# Patient Record
Sex: MALE | Race: WHITE | NOT HISPANIC OR LATINO | Employment: OTHER | URBAN - METROPOLITAN AREA
[De-identification: names, ages, dates, MRNs, and addresses within clinical notes are randomized per-mention and may not be internally consistent; named-entity substitution may affect disease eponyms.]

---

## 2017-08-22 ENCOUNTER — TRANSCRIBE ORDERS (OUTPATIENT)
Dept: PHYSICAL THERAPY | Facility: CLINIC | Age: 82
End: 2017-08-22

## 2017-08-22 DIAGNOSIS — M19.90 ARTHRITIS: Primary | ICD-10-CM

## 2017-08-22 DIAGNOSIS — M51.36 BULGING LUMBAR DISC: ICD-10-CM

## 2017-08-25 ENCOUNTER — TREATMENT (OUTPATIENT)
Dept: PHYSICAL THERAPY | Facility: CLINIC | Age: 82
End: 2017-08-25

## 2017-08-25 DIAGNOSIS — M54.42 LEFT-SIDED LOW BACK PAIN WITH LEFT-SIDED SCIATICA, UNSPECIFIED CHRONICITY: Primary | ICD-10-CM

## 2017-08-25 DIAGNOSIS — M51.36 BULGING LUMBAR DISC: ICD-10-CM

## 2017-08-25 PROCEDURE — 97110 THERAPEUTIC EXERCISES: CPT | Performed by: PHYSICAL THERAPIST

## 2017-08-25 PROCEDURE — G8978 MOBILITY CURRENT STATUS: HCPCS | Performed by: PHYSICAL THERAPIST

## 2017-08-25 PROCEDURE — G8979 MOBILITY GOAL STATUS: HCPCS | Performed by: PHYSICAL THERAPIST

## 2017-08-25 PROCEDURE — 97161 PT EVAL LOW COMPLEX 20 MIN: CPT | Performed by: PHYSICAL THERAPIST

## 2017-08-25 NOTE — PROGRESS NOTES
Physical Therapy Initial Evaluation and Plan of Care    Patient: Brandon Yo   : 1935  Diagnosis/ICD-10 Code:  No primary diagnosis found.  Referring practitioner: Bonita Sanchez MD  Past medical Hx reviewed: 2017  Subjective Evaluation    History of Present Illness  Onset date: 6 months prior.  Mechanism of injury: I started having back pain about 6 months ago but it gradually worsened.  I finally got to a point that I couldn't stand very long before having pain.  I saw doctor and had imaging done for the back.  She says I have bulging discs in the back.  I have problem lifting the L foot.  I have stumbled because of the foot but no falls.    Most difficulty with walking > 10-15 min before pain kicks in.  Taking medication helps but       Patient Occupation: retired.   Pain  Current pain ratin  At best pain ratin  At worst pain ratin  Location: L lower back, flank area.   (Can feel tired if I walk too long)  Quality: dull ache  Relieving factors: rest, medications and change in position  Aggravating factors: stairs, lifting and ambulation    Social Support  Lives in: one-story house  Lives with: spouse    Diagnostic Tests  X-ray: abnormal  MRI studies: abnormal (Lumbar disc bulge )    Patient Goals  Patient goals for therapy: return to sport/leisure activities, increased strength, independence with ADLs/IADLs, increased motion and decreased pain  Patient goal: back to 1x/week golf.        Objective     Static Posture     Lumbar Spine   Decreased lordosis.     Comments  L lateral pelvis, R shoulders shifted.    Active Range of Motion     Lumbar   Flexion: WFL  Extension: Active lumbar extension: 50%    Left lateral flexion: Active left lumbar lateral flexion: 75%    Right lateral flexion: Active right lumbar lateral flexion: 75%    Left rotation: Active left lumbar rotation: 75%   Right rotation: Active right lumbar rotation: 75%      Strength/Myotome Testing     Left Hip   Planes of  Motion   Flexion: 4+  Extension: 4+  Abduction: 4+  Adduction: 5  External rotation: 4    Right Hip   Planes of Motion   Flexion: 4+    Left Ankle/Foot   Dorsiflexion: 3+  Plantar flexion: 4-    Right Ankle/Foot   Dorsiflexion: 5    Tests     Lumbar     Right   Negative passive SLR.     Right Pelvic Girdle/Sacrum   Negative: sacrum compression and gapping.     Additional Tests Details  + (L) PINN      Ambulation   Weight-Bearing Status   Weight-Bearing Status (Left): full weight bearing   Weight-Bearing Status (Right): full weight-bearing      Ambulation: Level Surfaces   Ambulation without assistive device: independent    Ambulation: Stairs   Ascend stairs: independent  Pattern: reciprocal  Railings: one rail  Descend stairs: independent  Pattern: reciprocal  Railings: one rail    Observational Gait   Decreased walking speed.     Additional Observational Gait Details  Noted mild foot drop on L with ambulation.       Assessment & Plan     Assessment  Impairments: abnormal or restricted ROM, activity intolerance, impaired physical strength, lacks appropriate home exercise program and pain with function  Assessment details: Pt presents to PT with symptoms consistent with symptoms consistent with disc involvement and neural compression resulting in moderate drop foot on the L.  Pt would benefit from skilled PT intervention to address the deficits noted.     Prognosis: good  Prognosis details:     SHORT TERM GOALS: Time for Goal Achievement: 2-3 weeks    1.  Patient to be compliant and progression of HEP                             2.  Pain level < 4/10 at worst with mentioned activities to improve function  3.  Increased thoracic, lumbar and SIJ mobility to allow for increased lumbar AROM with less pain.  4.  Increased lumbar AROM to by 25% in all planes to allow for increased ease with sit-stand transfers and functional activities    LONG TERM GOALS: Time for Goal Achievement: 5- months  1.  Pt. to score < 10 % on  Back Index  2.  Pain level < 2/10 with all listed activities to return to normal and ambulate > 20 min.    3.  Lumbar AROM to WFL to allow for return to household & recreational activities w/o increased symptoms.    4.  (B) LE and lower abdominal strength to 5/5 to allow for pushing, pulling and activities to occur without pain (driving, sitting, household  & Job requirements)      Functional Limitations: carrying objects, lifting, sleeping, walking, uncomfortable because of pain, moving in bed, sitting and unable to perform repetitive tasks  Plan  Therapy options: will be seen for skilled physical therapy services  Planned modality interventions: cryotherapy, electrical stimulation/Russian stimulation, TENS and thermotherapy (hydrocollator packs)  Planned therapy interventions: body mechanics training, flexibility, functional ROM exercises, home exercise program, manual therapy, neuromuscular re-education, postural training, spinal/joint mobilization, stretching, strengthening and soft tissue mobilization  Frequency: 1x week (to 2x / week)  Duration in weeks: 6  Treatment plan discussed with: patient      Manual Therapy:    -     mins  51277;  Therapeutic Exercise:    18     mins  14284;     Neuromuscular Lisseth:    -    mins  31143;    Therapeutic Activity:     -     mins  43776;     Gait Training:      -     mins  79636;     Ultrasound:     -     mins  37092;    Electrical Stimulation:    -     mins  77751 ( );  Dry Needling     -     mins self-pay    Timed Treatment:   18   mins   Total Treatment:     55   mins    PT SIGNATURE: MARIPOSA George License #: [id number]    DATE TREATMENT INITIATED: 8/25/2017    Medicare Initial Certification  Certification Period: 11/23/2017  I certify that the therapy services are furnished while this patient is under my care.  The services outlined above are required by this patient, and will be reviewed every 90 days.     PHYSICIAN: Bonita Sanchez MD      DATE:      Please sign and return via fax to [phone].. Thank you, [facility].

## 2017-09-11 ENCOUNTER — TREATMENT (OUTPATIENT)
Dept: PHYSICAL THERAPY | Facility: CLINIC | Age: 82
End: 2017-09-11

## 2017-09-11 DIAGNOSIS — M51.36 BULGING LUMBAR DISC: ICD-10-CM

## 2017-09-11 DIAGNOSIS — M54.42 LEFT-SIDED LOW BACK PAIN WITH LEFT-SIDED SCIATICA, UNSPECIFIED CHRONICITY: Primary | ICD-10-CM

## 2017-09-11 PROCEDURE — 97110 THERAPEUTIC EXERCISES: CPT | Performed by: PHYSICAL THERAPIST

## 2017-09-11 NOTE — PROGRESS NOTES
Physical Therapy Daily Progress Note  Visits:2    Subjective : Brandon Yo reports: I went out of town and had to go to the ER for elevated BP > 200/??.  I think its the meloxicam.  I stopped taking it and I'm on my Lisinopril and doing fine. The back and lower leg are doing better overall though.  I have been working on my exercises.      ObjectiveL   See Exercise, Manual, and Modality Logs for complete treatment.     Assessment/Plan:Pt demonstrated good understanding of HEP and good compliance.  His form required very little correction.  Further lumbar stability required to keep symptoms from radiating into the LE.      Progress per Plan of Care       Manual Therapy:    6     mins  68302;  Therapeutic Exercise:    30     mins  12737;     Neuromuscular Lisseth:    -    mins  77109;    Therapeutic Activity:     -     mins  61346;     Gait Training:      -     mins  68395;     Ultrasound:     -     mins  65116;    Electrical Stimulation:    -     mins  41849 ( );  Dry Needling     -     mins self-pay    Timed Treatment:   36   mins   Total Treatment:     55   mins    Frank Whitley PT  [place] License #[id number]    Physical Therapist

## 2017-09-15 ENCOUNTER — TREATMENT (OUTPATIENT)
Dept: PHYSICAL THERAPY | Facility: CLINIC | Age: 82
End: 2017-09-15

## 2017-09-15 DIAGNOSIS — M51.36 BULGING LUMBAR DISC: ICD-10-CM

## 2017-09-15 DIAGNOSIS — M54.42 LEFT-SIDED LOW BACK PAIN WITH LEFT-SIDED SCIATICA, UNSPECIFIED CHRONICITY: Primary | ICD-10-CM

## 2017-09-15 PROCEDURE — 97110 THERAPEUTIC EXERCISES: CPT | Performed by: PHYSICAL THERAPIST

## 2017-09-15 PROCEDURE — 97140 MANUAL THERAPY 1/> REGIONS: CPT | Performed by: PHYSICAL THERAPIST

## 2017-09-15 NOTE — PROGRESS NOTES
Physical Therapy Daily Progress Note  Visits:3    Subjective : Brandon Yo reports: Feeling pretty good and doing exercises well.  Still having some trouble with some pain in the L hip.  Pain is more to the side and back of the L hip.  Crossing leg over to get shoes for example is tough, and bending like a squat to get something it can bother me.      Objective   See Exercise, Manual, and Modality Logs for complete treatment.     Assessment/Plan:Pt responded very well to hip mobilization and deep MFR to L hip external rotators.  Demonstrated ability to reach the L shoe freely with less restriction.  Pt instructed to keep up HEP until next visit for possible D/C.     Anticipate DC next Visit         Manual Therapy:    12     mins  62407;  Therapeutic Exercise:    25     mins  39557;     Neuromuscular Lisseth:    -    mins  82732;    Therapeutic Activity:     -     mins  04153;     Gait Training:      -     mins  42371;     Ultrasound:     -     mins  90248;    Electrical Stimulation:    -     mins  72949 ( );  Dry Needling     -     mins self-pay    Timed Treatment:   37   mins   Total Treatment:     50   mins    MARIPOSA George License #[id number]    Physical Therapist

## 2017-09-22 ENCOUNTER — TREATMENT (OUTPATIENT)
Dept: PHYSICAL THERAPY | Facility: CLINIC | Age: 82
End: 2017-09-22

## 2017-09-22 DIAGNOSIS — M51.36 BULGING LUMBAR DISC: ICD-10-CM

## 2017-09-22 DIAGNOSIS — M54.42 LEFT-SIDED LOW BACK PAIN WITH LEFT-SIDED SCIATICA, UNSPECIFIED CHRONICITY: Primary | ICD-10-CM

## 2017-09-22 PROCEDURE — 97110 THERAPEUTIC EXERCISES: CPT | Performed by: PHYSICAL THERAPIST

## 2017-09-22 NOTE — PROGRESS NOTES
Physical Therapy Discharge Note  Visits:4    Subjective : Brandon Yo reports: Still feeling really good.  No new complaints.  I use my exercise packet and it keeps me on track with what I'm supposed to do.   Pain: 0/10 most times.  Can be up to 3-4/10 occasionally and subsides in about 5 min. If I reposition.      Objective :  Active Range of Motion      Lumbar   Flexion: WFL  Extension: Active lumbar extension: 75%    Left lateral flexion: Active left lumbar lateral flexion: 75%    Right lateral flexion: Active right lumbar lateral flexion: 75%    Left rotation: Active left lumbar rotation: 75%   Right rotation: Active right lumbar rotation: 100%       Strength/Myotome Testing      Left Hip   Planes of Motion   Flexion: 5/5  Extension: 5/5  Abduction: 5/5  Adduction: 5  External rotation: 4     Right Hip   Planes of Motion   Flexion: 5-/5     Left Ankle/Foot   Dorsiflexion: 3+  Plantar flexion: 4/5     Right Ankle/Foot   Dorsiflexion: 5     Ambulation   Weight-Bearing Status   Weight-Bearing Status (Left): full weight bearing   Weight-Bearing Status (Right): full weight-bearing       Ambulation: Level Surfaces   Ambulation without assistive device: independent     Ambulation: Stairs   Ascend stairs: independent  Pattern: reciprocal  Railings: one rail  Descend stairs: independent  Pattern: reciprocal  Railings: one rail     See Exercise, Manual, and Modality Logs for complete treatment.     Assessment & Plan     Assessment  Impairments: abnormal gait and impaired physical strength  Impairments comments: L ankle DF  Assessment details: Brandon has done very well with PT intervention.  He has an excellent understanding of HEP and demonstrates good compliance.  He has met all goals with exception to L ankle DF strength.  I believe that may be ongoing weakness from nerve compression that has yet to resolve.    Prognosis: good    Goals  Plan Goals: All goals met with exception of outcomes survey goal missed by 2%        Discharge to [facility]     Manual Therapy:    -     mins  32754;  Therapeutic Exercise:    35     mins  31468;     Neuromuscular Lisseth:    -    mins  40441;    Therapeutic Activity:     5     mins  10895;     Gait Training:      -     mins  58347;     Ultrasound:     -     mins  07251;    Electrical Stimulation:    -     mins  49666 ( );  Dry Needling     -     mins self-pay    Timed Treatment:   40   mins   Total Treatment:     55   mins    MARIPOSA George License #[id number]    Physical Therapist

## 2018-09-02 ENCOUNTER — OFFICE VISIT (OUTPATIENT)
Dept: RETAIL CLINIC | Facility: CLINIC | Age: 83
End: 2018-09-02

## 2018-09-02 VITALS
HEART RATE: 110 BPM | SYSTOLIC BLOOD PRESSURE: 202 MMHG | RESPIRATION RATE: 18 BRPM | DIASTOLIC BLOOD PRESSURE: 96 MMHG | OXYGEN SATURATION: 96 % | TEMPERATURE: 98.8 F

## 2018-09-02 DIAGNOSIS — K59.00 CONSTIPATION, UNSPECIFIED CONSTIPATION TYPE: Primary | ICD-10-CM

## 2018-09-02 DIAGNOSIS — I10 HYPERTENSION, UNSPECIFIED TYPE: ICD-10-CM

## 2018-09-02 PROCEDURE — 99213 OFFICE O/P EST LOW 20 MIN: CPT | Performed by: NURSE PRACTITIONER

## 2018-09-02 RX ORDER — POLYETHYLENE GLYCOL 3350 17 G/17G
17 POWDER, FOR SOLUTION ORAL DAILY
Qty: 10 PACKET | Refills: 0 | Status: SHIPPED | OUTPATIENT
Start: 2018-09-02 | End: 2018-09-12

## 2018-09-02 RX ORDER — GABAPENTIN 100 MG/1
100 CAPSULE ORAL 3 TIMES DAILY
COMMUNITY

## 2018-09-02 RX ORDER — LISINOPRIL 20 MG/1
20 TABLET ORAL DAILY
COMMUNITY

## 2018-09-02 RX ORDER — OMEPRAZOLE 20 MG/1
20 CAPSULE, DELAYED RELEASE ORAL DAILY
COMMUNITY

## 2018-09-02 NOTE — PATIENT INSTRUCTIONS
Constipation, Adult  Constipation is when a person has fewer bowel movements in a week than normal, has difficulty having a bowel movement, or has stools that are dry, hard, or larger than normal. Constipation may be caused by an underlying condition. It may become worse with age if a person takes certain medicines and does not take in enough fluids.  Follow these instructions at home:  Eating and drinking    · Eat foods that have a lot of fiber, such as fresh fruits and vegetables, whole grains, and beans.  · Limit foods that are high in fat, low in fiber, or overly processed, such as french fries, hamburgers, cookies, candies, and soda.  · Drink enough fluid to keep your urine clear or pale yellow.  General instructions  · Exercise regularly or as told by your health care provider.  · Go to the restroom when you have the urge to go. Do not hold it in.  · Take over-the-counter and prescription medicines only as told by your health care provider. These include any fiber supplements.  · Practice pelvic floor retraining exercises, such as deep breathing while relaxing the lower abdomen and pelvic floor relaxation during bowel movements.  · Watch your condition for any changes.  · Keep all follow-up visits as told by your health care provider. This is important.  Contact a health care provider if:  · You have pain that gets worse.  · You have a fever.  · You do not have a bowel movement after 4 days.  · You vomit.  · You are not hungry.  · You lose weight.  · You are bleeding from the anus.  · You have thin, pencil-like stools.  Get help right away if:  · You have a fever and your symptoms suddenly get worse.  · You leak stool or have blood in your stool.  · Your abdomen is bloated.  · You have severe pain in your abdomen.  · You feel dizzy or you faint.  This information is not intended to replace advice given to you by your health care provider. Make sure you discuss any questions you have with your health care  "provider.  Document Released: 09/15/2005 Document Revised: 07/07/2017 Document Reviewed: 06/07/2017  [employer] Interactive Patient Education © 2018 [employer]  Hypertension  Hypertension, commonly called high blood pressure, is when the force of blood pumping through the arteries is too strong. The arteries are the blood vessels that carry blood from the heart throughout the body. Hypertension forces the heart to work harder to pump blood and may cause arteries to become narrow or stiff. Having untreated or uncontrolled hypertension can cause heart attacks, strokes, kidney disease, and other problems.  A blood pressure reading consists of a higher number over a lower number. Ideally, your blood pressure should be below 120/80. The first (\"top\") number is called the systolic pressure. It is a measure of the pressure in your arteries as your heart beats. The second (\"bottom\") number is called the diastolic pressure. It is a measure of the pressure in your arteries as the heart relaxes.  What are the causes?  The cause of this condition is not known.  What increases the risk?  Some risk factors for high blood pressure are under your control. Others are not.  Factors you can change  · Smoking.  · Having type 2 diabetes mellitus, high cholesterol, or both.  · Not getting enough exercise or physical activity.  · Being overweight.  · Having too much fat, sugar, calories, or salt (sodium) in your diet.  · Drinking too much alcohol.  Factors that are difficult or impossible to change  · Having chronic kidney disease.  · Having a family history of high blood pressure.  · Age. Risk increases with age.  · Race. You may be at higher risk if you are -American.  · Gender. Men are at higher risk than women before age 45. After age 65, women are at higher risk than men.  · Having obstructive sleep apnea.  · Stress.  What are the signs or symptoms?  Extremely high blood pressure (hypertensive crisis) may " cause:  · Headache.  · Anxiety.  · Shortness of breath.  · Nosebleed.  · Nausea and vomiting.  · Severe chest pain.  · Jerky movements you cannot control (seizures).    How is this diagnosed?  This condition is diagnosed by measuring your blood pressure while you are seated, with your arm resting on a surface. The cuff of the blood pressure monitor will be placed directly against the skin of your upper arm at the level of your heart. It should be measured at least twice using the same arm. Certain conditions can cause a difference in blood pressure between your right and left arms.  Certain factors can cause blood pressure readings to be lower or higher than normal (elevated) for a short period of time:  · When your blood pressure is higher when you are in a health care provider's office than when you are at home, this is called white coat hypertension. Most people with this condition do not need medicines.  · When your blood pressure is higher at home than when you are in a health care provider's office, this is called masked hypertension. Most people with this condition may need medicines to control blood pressure.    If you have a high blood pressure reading during one visit or you have normal blood pressure with other risk factors:  · You may be asked to return on a different day to have your blood pressure checked again.  · You may be asked to monitor your blood pressure at home for 1 week or longer.    If you are diagnosed with hypertension, you may have other blood or imaging tests to help your health care provider understand your overall risk for other conditions.  How is this treated?  This condition is treated by making healthy lifestyle changes, such as eating healthy foods, exercising more, and reducing your alcohol intake. Your health care provider may prescribe medicine if lifestyle changes are not enough to get your blood pressure under control, and if:  · Your systolic blood pressure is above  130.  · Your diastolic blood pressure is above 80.    Your personal target blood pressure may vary depending on your medical conditions, your age, and other factors.  Follow these instructions at home:  Eating and drinking  · Eat a diet that is high in fiber and potassium, and low in sodium, added sugar, and fat. An example eating plan is called the DASH (Dietary Approaches to Stop Hypertension) diet. To eat this way:  ? Eat plenty of fresh fruits and vegetables. Try to fill half of your plate at each meal with fruits and vegetables.  ? Eat whole grains, such as whole wheat pasta, brown rice, or whole grain bread. Fill about one quarter of your plate with whole grains.  ? Eat or drink low-fat dairy products, such as skim milk or low-fat yogurt.  ? Avoid fatty cuts of meat, processed or cured meats, and poultry with skin. Fill about one quarter of your plate with lean proteins, such as fish, chicken without skin, beans, eggs, and tofu.  ? Avoid premade and processed foods. These tend to be higher in sodium, added sugar, and fat.  · Reduce your daily sodium intake. Most people with hypertension should eat less than 1,500 mg of sodium a day.  · Limit alcohol intake to no more than 1 drink a day for nonpregnant women and 2 drinks a day for men. One drink equals 12 oz of beer, 5 oz of wine, or 1½ oz of hard liquor.  Lifestyle  · Work with your health care provider to maintain a healthy body weight or to lose weight. Ask what an ideal weight is for you.  · Get at least 30 minutes of exercise that causes your heart to beat faster (aerobic exercise) most days of the week. Activities may include walking, swimming, or biking.  · Include exercise to strengthen your muscles (resistance exercise), such as pilates or lifting weights, as part of your weekly exercise routine. Try to do these types of exercises for 30 minutes at least 3 days a week.  · Do not use any products that contain nicotine or tobacco, such as cigarettes and  e-cigarettes. If you need help quitting, ask your health care provider.  · Monitor your blood pressure at home as told by your health care provider.  · Keep all follow-up visits as told by your health care provider. This is important.  Medicines  · Take over-the-counter and prescription medicines only as told by your health care provider. Follow directions carefully. Blood pressure medicines must be taken as prescribed.  · Do not skip doses of blood pressure medicine. Doing this puts you at risk for problems and can make the medicine less effective.  · Ask your health care provider about side effects or reactions to medicines that you should watch for.  Contact a health care provider if:  · You think you are having a reaction to a medicine you are taking.  · You have headaches that keep coming back (recurring).  · You feel dizzy.  · You have swelling in your ankles.  · You have trouble with your vision.  Get help right away if:  · You develop a severe headache or confusion.  · You have unusual weakness or numbness.  · You feel faint.  · You have severe pain in your chest or abdomen.  · You vomit repeatedly.  · You have trouble breathing.  Summary  · Hypertension is when the force of blood pumping through your arteries is too strong. If this condition is not controlled, it may put you at risk for serious complications.  · Your personal target blood pressure may vary depending on your medical conditions, your age, and other factors. For most people, a normal blood pressure is less than 120/80.  · Hypertension is treated with lifestyle changes, medicines, or a combination of both. Lifestyle changes include weight loss, eating a healthy, low-sodium diet, exercising more, and limiting alcohol.  This information is not intended to replace advice given to you by your health care provider. Make sure you discuss any questions you have with your health care provider.  Document Released: 12/18/2006 Document Revised: 11/15/2017  Document Reviewed: 11/15/2017  [employer] Interactive Patient Education © 2018 [employer]

## 2018-09-02 NOTE — PROGRESS NOTES
Subjective:     Brandon Yo is a 82 y.o.     Constipation   This is a new problem. The current episode started in the past 7 days (concerned suppsed to leave on plane at 8p). Stool description: last bowel movemnet normal. Associated symptoms include bloating and flatus. Pertinent negatives include no diarrhea, fever, nausea or vomiting. He has tried stool softeners for the symptoms.         The following portions of the patient's history were reviewed and updated as appropriate: allergies, current medications, past family history, past medical history, past social history, past surgical history and problem list.      Review of Systems   Constitutional: Negative for fever.   Respiratory: Negative.    Cardiovascular:        Hx:hypertension   Gastrointestinal: Positive for bloating, constipation and flatus. Negative for diarrhea, nausea and vomiting.        Occasional hiccups and belching         Objective:      Physical Exam   Constitutional: Vital signs are normal.   Cardiovascular: Normal rate, regular rhythm, S1 normal, S2 normal and normal heart sounds.    Pulmonary/Chest: Effort normal and breath sounds normal.   Abdominal: He exhibits distension. Bowel sounds are decreased. There is tenderness.   Vitals reviewed.          Brandon was seen today for constipation.    Diagnoses and all orders for this visit:    Constipation, unspecified constipation type    Hypertension, unspecified type    Other orders  -     polyethylene glycol (MIRALAX) packet; Take 17 g by mouth Daily for 10 days.    Reports white coat syndrome. Monitor BP at home (has cuff). BP down slightly at discharge 188/88.  If no improvement  Or worsening at home proceed to ER.  For constipation miralax and Brown cow ( warm prune juice and milk of magnesia now and at bedtime). If no improvement proceed to ER.

## 2018-10-08 ENCOUNTER — TRANSCRIBE ORDERS (OUTPATIENT)
Dept: ADMINISTRATIVE | Facility: HOSPITAL | Age: 83
End: 2018-10-08

## 2018-10-08 DIAGNOSIS — J84.10 PULMONARY FIBROSIS (HCC): Primary | ICD-10-CM

## 2018-10-08 DIAGNOSIS — J98.6 DIAPHRAGMATIC DISORDER: Primary | ICD-10-CM

## 2018-10-11 ENCOUNTER — HOSPITAL ENCOUNTER (OUTPATIENT)
Dept: GENERAL RADIOLOGY | Facility: HOSPITAL | Age: 83
Discharge: HOME | End: 2018-10-11
Attending: INTERNAL MEDICINE | Admitting: INTERNAL MEDICINE

## 2018-10-11 DIAGNOSIS — J98.6 DIAPHRAGMATIC DISORDER: ICD-10-CM

## 2018-10-11 PROCEDURE — 76000 FLUOROSCOPY <1 HR PHYS/QHP: CPT

## 2019-05-09 ENCOUNTER — TRANSCRIBE ORDERS (OUTPATIENT)
Dept: ADMINISTRATIVE | Facility: HOSPITAL | Age: 84
End: 2019-05-09

## 2019-05-09 DIAGNOSIS — J84.10 PULMONARY FIBROSIS (HCC): Primary | ICD-10-CM

## 2019-10-17 ENCOUNTER — HOSPITAL ENCOUNTER (OUTPATIENT)
Dept: CT IMAGING | Facility: HOSPITAL | Age: 84
Discharge: HOME | End: 2019-10-17
Admitting: INTERNAL MEDICINE

## 2019-10-17 DIAGNOSIS — J84.10 PULMONARY FIBROSIS (HCC): ICD-10-CM

## 2019-10-17 PROCEDURE — 71250 CT THORAX DX C-: CPT

## 2022-05-20 ENCOUNTER — TREATMENT (OUTPATIENT)
Dept: PHYSICAL THERAPY | Facility: CLINIC | Age: 87
End: 2022-05-20

## 2022-05-20 DIAGNOSIS — M25.552 LEFT HIP PAIN: ICD-10-CM

## 2022-05-20 DIAGNOSIS — R26.9 GAIT DIFFICULTY: ICD-10-CM

## 2022-05-20 DIAGNOSIS — M54.42 ACUTE LEFT-SIDED LOW BACK PAIN WITH LEFT-SIDED SCIATICA: Primary | ICD-10-CM

## 2022-05-20 PROCEDURE — 97530 THERAPEUTIC ACTIVITIES: CPT | Performed by: PHYSICAL THERAPIST

## 2022-05-20 PROCEDURE — 97162 PT EVAL MOD COMPLEX 30 MIN: CPT | Performed by: PHYSICAL THERAPIST

## 2022-05-20 NOTE — PROGRESS NOTES
Physical Therapy Initial Evaluation and Plan of Care    Patient: Brandon Yo   : 1935  Diagnosis/ICD-10 Code:  Acute left-sided low back pain with left-sided sciatica [M54.42]  Referring practitioner: Mike Covarrubias MD  Past medical Hx reviewed: 2022    Subjective Evaluation    History of Present Illness  Onset date: .    Mechanism of injury: We live in [place] and we were coming back up to [place] and I drove for about 6 hours after seeing my Chiropractor and I think that's what caused the flare up in the back and leg.  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  The foot drop seems to be a little bit worse.  I do have an (AFO) but I haven't been using it.  Getting shoes on hurt the hip to do it.        Patient Occupation: Retired  Quality of life: good    Pain  Current pain ratin  At best pain ratin  At worst pain ratin (Hip.  Low back; 5-6/10. )  Location: L lumbar spine, L hip, thigh and leg.    Quality: tight, sharp and radiating (Hip joint feels like a knife)  Relieving factors: medications and rest (Aleve (helpful).  lying on back feels best.  )  Aggravating factors: ambulation, lifting and standing    Social Support  Lives in: one-story house  Lives with: spouse    Diagnostic Tests  No diagnostic tests performed    Treatments  Previous treatment: physical therapy and chiropractic  Patient Goals  Patient goals for therapy: increased strength, decreased pain, improved balance, increased motion and independence with ADLs/IADLs  Patient goal: Riding in the car for vacation.      PLOF: Independent without use of Rollator.  Hx of lumbar decompression/laminectomy approx 7 yrs ago.    Objective          Palpation   Left   Hypertonic in the gluteus medius and piriformis.   Tenderness of the gluteus medius and piriformis.     Active Range of Motion     Lumbar   Flexion: Active lumbar flexion: 50% with pain  Extension: Active lumbar extension: 50% (feels better)     Left lateral flexion: Active left lumbar lateral flexion: 50%, SLight low back discomfort.   Right lateral flexion: Active right lumbar lateral flexion: 50%    Left rotation: Active left lumbar rotation: 25%   Right rotation: Active right lumbar rotation: 50%     Strength/Myotome Testing     Left Hip   Planes of Motion   Abduction: 4  Adduction: 5  External rotation: 4    Left Knee   Flexion: 4+  Extension: 4    Left Ankle/Foot   Dorsiflexion: 1 (trace mm activation)  Plantar flexion: 4-     Assessment & Plan     Assessment  Impairments: abnormal gait, abnormal or restricted ROM, activity intolerance, impaired balance, impaired physical strength, lacks appropriate home exercise program, pain with function and weight-bearing intolerance  Functional Limitations: carrying objects, lifting, walking, uncomfortable because of pain, standing and stooping  Assessment details: Pt presents to PT with symptoms consistent with L hip dysfunction indicative of possibility of OA and lumbar dysfunction leading to pain.  His prolonged duration of L drop foot has most likely promoted some L hip pain from having to actively hip flex with each step for foot clearance.  He would benefit from AFO use to provide better ease of gait.  Pt would benefit from skilled PT intervention to address the deficits noted.     Barriers to therapy: Severity and duration of symptoms.    Prognosis: fair    Goals  Plan Goals:  SHORT TERM GOALS:  1.  Patient to be compliant with HEP and demo good efficiency with TE  2.  Report < 2 sleep disturbances 2° hip pain.     3.  Increased Lumbar and SIJ mobility to allow for improved pelvic alignment and gait mechanics (equal step length and level pelvis throughout gait).    4.  Increased hip ER/IR ROM to WFL (IR to 30°) degrees to allow for increased ease with bed mobility and squatting.  5. Pt will report minimal-no tenderness to palpation with firm pressure to hip and low back.   6. Pt. Able to ambulate up to  20 min with pain < 5/10 with acceptable pattern.      LONG TERM GOALS:  1.  Pt. to score > 75% perceived ability on LEFS, <15% on Back index   2.  Pain level < 2/10 in hip / low back with all activities including sitting > 1 hr continuously.   3.  Hip  AROM to WNL to allow for return to ADL's/IADLS and functional activities without increased symptoms  4. Hip strength to 5-/5  to allow for pushing, pulling and more strenuous activities to occur without pain.  Walk > 30 min.  no pain  5. No palpable tenderness to the hip.         Plan  Therapy options: will be seen for skilled therapy services  Planned modality interventions: cryotherapy, electrical stimulation/Russian stimulation, iontophoresis, TENS, thermotherapy (hydrocollator packs), traction and ultrasound  Other planned modality interventions: Dry Needling  Planned therapy interventions: abdominal trunk stabilization, ADL retraining, body mechanics training, balance/weight-bearing training, flexibility, functional ROM exercises, gait training, home exercise program, joint mobilization, manual therapy, neuromuscular re-education, postural training, soft tissue mobilization, spinal/joint mobilization, strengthening, stretching and therapeutic activities  Frequency: 2x week  Duration in visits: 12  Treatment plan discussed with: patient      Manual Therapy:    -     mins  61513;  Therapeutic Exercise:    -     mins  69055;     Neuromuscular Lisseth:    -    mins  03259;    Therapeutic Activity:     18     mins  18825;   Pt education of hip involvement and importance of AFO for L drop foot.    Gait Training:      -     mins  13856;     Ultrasound:     -     mins  73218;    Electrical Stimulation:    -     mins  11806 ( );  Dry Needling     -     mins self-pay    Timed Treatment:   18   mins   Total Treatment:     60   mins    PT SIGNATURE:  Frank Whitley DPT, PT     Frank Whitley PT   [place] License #: [id number]    DATE TREATMENT INITIATED: 5/23/2022    Medicare  Initial Certification  Certification Period: 8/21/2022  I certify that the therapy services are furnished while this patient is under my care.  The services outlined above are required by this patient, and will be reviewed every 90 days.     PHYSICIAN: Mike Covarrubias MD      DATE:     Please sign and return via fax to [phone].. Thank you, [facility] Physical Therapy.

## 2022-05-23 ENCOUNTER — TREATMENT (OUTPATIENT)
Dept: PHYSICAL THERAPY | Facility: CLINIC | Age: 87
End: 2022-05-23

## 2022-05-23 DIAGNOSIS — R26.9 GAIT DIFFICULTY: ICD-10-CM

## 2022-05-23 DIAGNOSIS — M25.552 LEFT HIP PAIN: ICD-10-CM

## 2022-05-23 DIAGNOSIS — M54.42 ACUTE LEFT-SIDED LOW BACK PAIN WITH LEFT-SIDED SCIATICA: Primary | ICD-10-CM

## 2022-05-23 PROCEDURE — 97140 MANUAL THERAPY 1/> REGIONS: CPT | Performed by: PHYSICAL THERAPIST

## 2022-05-23 PROCEDURE — 97110 THERAPEUTIC EXERCISES: CPT | Performed by: PHYSICAL THERAPIST

## 2022-05-27 NOTE — PROGRESS NOTES
Physical Therapy Daily Progress Note      Patient: Brandon Yo   : 1935  Diagnosis/ICD-10 Code:  Acute left-sided low back pain with left-sided sciatica [M54.42]  Referring practitioner: Mike Covarrubias MD  Date of Initial Visit: Type: THERAPY  Noted: 2022  Today's Date: 2022  Patient seen for 2 sessions    Subjective : Brandon Yo reports: I will be seeing the doctor this week to get an injection to the hip.  It has really been bothering me lately.  They still haven't suggested X-rays.      Objective:   See Exercise, Manual, and Modality Logs for complete treatment.     Assessment/Plan:Pt did not have much relief from any of the interventions performed for the hip and or lower back this visit.  It has been recommended that they schedule follow up with physician for imaging for the hip.  We will continue to treat until they can be seen.       Progress per Plan of Care and Progress strengthening /stabilization /functional activity     Manual Therapy:    15     mins  92860;  Therapeutic Exercise:    25     mins  57549;     Neuromuscular Lisseth:    -    mins  25366;    Therapeutic Activity:     -     mins  38687;     Gait Training:      -     mins  97633;     Ultrasound:     -     mins  50609;    Electrical Stimulation:    -     mins  81993 ( );  Dry Needling     -     mins self-pay    Timed Treatment:   40   mins   Total Treatment:     50   mins      MARIPOSA George License #[id number]    Physical Therapist

## 2022-06-09 ENCOUNTER — HOSPITAL ENCOUNTER (OUTPATIENT)
Dept: GENERAL RADIOLOGY | Facility: HOSPITAL | Age: 87
Discharge: HOME | End: 2022-06-09
Admitting: PAIN MEDICINE

## 2022-06-09 ENCOUNTER — TRANSCRIBE ORDERS (OUTPATIENT)
Dept: ADMINISTRATIVE | Facility: HOSPITAL | Age: 87
End: 2022-06-09

## 2022-06-09 DIAGNOSIS — M54.16 LUMBAR RADICULOPATHY: Primary | ICD-10-CM

## 2022-06-09 DIAGNOSIS — M54.16 LUMBAR RADICULOPATHY: ICD-10-CM

## 2022-06-09 PROCEDURE — 72110 X-RAY EXAM L-2 SPINE 4/>VWS: CPT

## 2023-01-01 ENCOUNTER — HOSPITAL ENCOUNTER (INPATIENT)
Facility: HOSPITAL | Age: 88
LOS: 4 days | DRG: 871 | End: 2023-09-26
Attending: EMERGENCY MEDICINE | Admitting: INTERNAL MEDICINE
Payer: MEDICARE

## 2023-01-01 ENCOUNTER — APPOINTMENT (OUTPATIENT)
Dept: CARDIOLOGY | Facility: HOSPITAL | Age: 88
DRG: 871 | End: 2023-01-01
Payer: MEDICARE

## 2023-01-01 ENCOUNTER — APPOINTMENT (OUTPATIENT)
Dept: GENERAL RADIOLOGY | Facility: HOSPITAL | Age: 88
DRG: 871 | End: 2023-01-01
Payer: MEDICARE

## 2023-01-01 ENCOUNTER — APPOINTMENT (OUTPATIENT)
Dept: CT IMAGING | Facility: HOSPITAL | Age: 88
DRG: 871 | End: 2023-01-01
Payer: MEDICARE

## 2023-01-01 ENCOUNTER — APPOINTMENT (OUTPATIENT)
Dept: ULTRASOUND IMAGING | Facility: HOSPITAL | Age: 88
DRG: 871 | End: 2023-01-01
Payer: MEDICARE

## 2023-01-01 VITALS
HEART RATE: 101 BPM | RESPIRATION RATE: 16 BRPM | BODY MASS INDEX: 22.59 KG/M2 | HEIGHT: 68 IN | WEIGHT: 149.03 LBS | SYSTOLIC BLOOD PRESSURE: 81 MMHG | DIASTOLIC BLOOD PRESSURE: 49 MMHG | TEMPERATURE: 99.3 F | OXYGEN SATURATION: 70 %

## 2023-01-01 DIAGNOSIS — R06.03 RESPIRATORY DISTRESS: Primary | ICD-10-CM

## 2023-01-01 LAB
A-A DO2: 61 MMHG
ALBUMIN SERPL ELPH-MCNC: 2.7 G/DL (ref 2.9–4.4)
ALBUMIN SERPL-MCNC: 2.7 G/DL (ref 3.5–5.2)
ALBUMIN SERPL-MCNC: 2.7 G/DL (ref 3.5–5.2)
ALBUMIN SERPL-MCNC: 2.8 G/DL (ref 3.5–5.2)
ALBUMIN SERPL-MCNC: 4.1 G/DL (ref 3.5–5.2)
ALBUMIN/GLOB SERPL: 0.9 G/DL
ALBUMIN/GLOB SERPL: 0.9 G/DL
ALBUMIN/GLOB SERPL: 1 G/DL
ALBUMIN/GLOB SERPL: 1.1 {RATIO} (ref 0.7–1.7)
ALBUMIN/GLOB SERPL: 1.2 G/DL
ALP SERPL-CCNC: 107 U/L (ref 39–117)
ALP SERPL-CCNC: 107 U/L (ref 39–117)
ALP SERPL-CCNC: 115 U/L (ref 39–117)
ALP SERPL-CCNC: 71 U/L (ref 39–117)
ALPHA1 GLOB SERPL ELPH-MCNC: 0.3 G/DL (ref 0–0.4)
ALPHA2 GLOB SERPL ELPH-MCNC: 0.6 G/DL (ref 0.4–1)
ALT SERPL W P-5'-P-CCNC: 13 U/L (ref 1–41)
ALT SERPL W P-5'-P-CCNC: 3632 U/L (ref 1–41)
ALT SERPL W P-5'-P-CCNC: 4818 U/L (ref 1–41)
ALT SERPL W P-5'-P-CCNC: 5829 U/L (ref 1–41)
AMMONIA BLD-SCNC: 43 UMOL/L (ref 16–60)
AMORPH URATE CRY URNS QL MICRO: ABNORMAL /HPF
AMPHET+METHAMPHET UR QL: NEGATIVE
ANION GAP SERPL CALCULATED.3IONS-SCNC: 10.9 MMOL/L (ref 5–15)
ANION GAP SERPL CALCULATED.3IONS-SCNC: 16.2 MMOL/L (ref 5–15)
ANION GAP SERPL CALCULATED.3IONS-SCNC: 22.4 MMOL/L (ref 5–15)
ANION GAP SERPL CALCULATED.3IONS-SCNC: 23 MMOL/L (ref 5–15)
ANION GAP SERPL CALCULATED.3IONS-SCNC: 24.4 MMOL/L (ref 5–15)
ANION GAP SERPL CALCULATED.3IONS-SCNC: 24.9 MMOL/L (ref 5–15)
ANION GAP SERPL CALCULATED.3IONS-SCNC: 25 MMOL/L (ref 5–15)
ANISOCYTOSIS BLD QL: ABNORMAL
APAP SERPL-MCNC: <5 MCG/ML (ref 0–30)
ARTERIAL PATENCY WRIST A: POSITIVE
AST SERPL-CCNC: 14 U/L (ref 1–40)
AST SERPL-CCNC: 2412 U/L (ref 1–40)
AST SERPL-CCNC: 4873 U/L (ref 1–40)
AST SERPL-CCNC: 6716 U/L (ref 1–40)
ATMOSPHERIC PRESS: 746.5 MMHG
ATMOSPHERIC PRESS: 748.5 MMHG
ATMOSPHERIC PRESS: 752.7 MMHG
B-GLOBULIN SERPL ELPH-MCNC: 0.6 G/DL (ref 0.7–1.3)
BACTERIA SPEC AEROBE CULT: NORMAL
BACTERIA SPEC AEROBE CULT: NORMAL
BACTERIA UR QL AUTO: ABNORMAL /HPF
BARBITURATES UR QL SCN: NEGATIVE
BASE EXCESS BLDA CALC-SCNC: -0.1 MMOL/L (ref 0–2)
BASE EXCESS BLDA CALC-SCNC: 0.9 MMOL/L (ref 0–2)
BASE EXCESS BLDA CALC-SCNC: 1.8 MMOL/L (ref 0–2)
BASOPHILS # BLD AUTO: 0.03 10*3/MM3 (ref 0–0.2)
BASOPHILS NFR BLD AUTO: 0.3 % (ref 0–1.5)
BDY SITE: ABNORMAL
BENZODIAZ UR QL SCN: NEGATIVE
BH CV ECHO MEAS - LAT PEAK E' VEL: 5.4 CM/SEC
BH CV ECHO MEAS - MED PEAK E' VEL: 5.1 CM/SEC
BH CV ECHO MEAS - MV A MAX VEL: 107.5 CM/SEC
BH CV ECHO MEAS - MV DEC SLOPE: 283.2 CM/SEC2
BH CV ECHO MEAS - MV DEC TIME: 0.14 SEC
BH CV ECHO MEAS - MV E MAX VEL: 48.8 CM/SEC
BH CV ECHO MEAS - MV E/A: 0.45
BH CV ECHO MEAS - MV MAX PG: 4.8 MMHG
BH CV ECHO MEAS - MV MEAN PG: 1.48 MMHG
BH CV ECHO MEAS - MV P1/2T: 61.1 MSEC
BH CV ECHO MEAS - MV V2 VTI: 21.8 CM
BH CV ECHO MEAS - MVA(P1/2T): 3.6 CM2
BH CV ECHO MEAS - PA ACC TIME: 0.11 SEC
BH CV ECHO MEAS - PA V2 MAX: 105.1 CM/SEC
BH CV ECHO MEAS - RAP SYSTOLE: 3 MMHG
BH CV ECHO MEAS - RV MAX PG: 3.8 MMHG
BH CV ECHO MEAS - RV V1 MAX: 97.4 CM/SEC
BH CV ECHO MEAS - RV V1 VTI: 13.9 CM
BH CV ECHO MEASUREMENTS AVERAGE E/E' RATIO: 9.3
BH CV VAS HEPATIC PORTAL VEIN DIAMETER: 0.48 CM
BH CV VAS HEPATOPORTAL HEPATIC V LT DIRECTION: NORMAL
BH CV VAS HEPATOPORTAL HEPATIC V MID DIRECTION: NORMAL
BH CV VAS HEPATOPORTAL HEPATIC V RT DIRECTION: NORMAL
BH CV VAS HEPATOPORTAL IVC SPONT: NORMAL
BH CV VAS HEPATOPORTAL PORTAL V EXTRAHEPATIC DIRECTION: NORMAL
BH CV VAS HEPATOPORTAL PORTAL V LT INTRA DIRECTION: NORMAL
BH CV VAS HEPATOPORTAL PORTAL V MAIN INTRA DIRECTION: NORMAL
BH CV VAS HEPATOPORTAL PORTAL V PSV: 35.1 CM/S
BH CV VAS HEPATOPORTAL PORTAL V RT INTRA DIRECTION: NORMAL
BH CV VAS HEPATOPORTAL SMV DIRECTION: NORMAL
BH CV VAS HEPATOPORTAL SPLENIC DIRECTION: NORMAL
BH CV VAS SMA HEPATIC EDV: 41.3 CM/S
BH CV VAS SMA HEPATIC PSV: 116 CM/S
BH CV VAS SMA SPLENIC PSV: 80.9 CM/S
BH CV XLRA - TDI S': 9.5 CM/SEC
BILIRUB CONJ SERPL-MCNC: 3.7 MG/DL (ref 0–0.3)
BILIRUB INDIRECT SERPL-MCNC: 2.9 MG/DL
BILIRUB SERPL-MCNC: 0.3 MG/DL (ref 0–1.2)
BILIRUB SERPL-MCNC: 6.4 MG/DL (ref 0–1.2)
BILIRUB SERPL-MCNC: 6.6 MG/DL (ref 0–1.2)
BILIRUB SERPL-MCNC: 7.3 MG/DL (ref 0–1.2)
BILIRUB SERPL-MCNC: 7.7 MG/DL (ref 0–1.2)
BILIRUB UR QL STRIP: NEGATIVE
BUN BLDA-MCNC: 84 MG/DL
BUN BLDA-MCNC: 98 MG/DL
BUN SERPL-MCNC: 101 MG/DL (ref 8–23)
BUN SERPL-MCNC: 29 MG/DL (ref 8–23)
BUN SERPL-MCNC: 33 MG/DL (ref 8–23)
BUN SERPL-MCNC: 63 MG/DL (ref 8–23)
BUN SERPL-MCNC: 82 MG/DL (ref 8–23)
BUN SERPL-MCNC: 93 MG/DL (ref 8–23)
BUN SERPL-MCNC: 96 MG/DL (ref 8–23)
BUN/CREAT SERPL: 14.8 (ref 7–25)
BUN/CREAT SERPL: 15.5 (ref 7–25)
BUN/CREAT SERPL: 17.4 (ref 7–25)
BUN/CREAT SERPL: 18.6 (ref 7–25)
BUN/CREAT SERPL: 19 (ref 7–25)
BUN/CREAT SERPL: 20.8 (ref 7–25)
BUN/CREAT SERPL: 25.2 (ref 7–25)
C3 SERPL-MCNC: 33 MG/DL (ref 82–167)
C4 SERPL-MCNC: 11 MG/DL (ref 14–44)
CA-I BLD-MCNC: 3.6 MG/DL (ref 4.6–5.4)
CA-I BLDA-SCNC: 0.91 MMOL/L (ref 2.2–2.55)
CA-I BLDA-SCNC: 1 MMOL/L (ref 2.2–2.55)
CA-I SERPL ISE-MCNC: 0.9 MMOL/L (ref 1.15–1.35)
CALCIUM SPEC-SCNC: 7.7 MG/DL (ref 8.6–10.5)
CALCIUM SPEC-SCNC: 7.7 MG/DL (ref 8.6–10.5)
CALCIUM SPEC-SCNC: 7.9 MG/DL (ref 8.6–10.5)
CALCIUM SPEC-SCNC: 8.4 MG/DL (ref 8.6–10.5)
CALCIUM SPEC-SCNC: 9.3 MG/DL (ref 8.6–10.5)
CALCIUM SPEC-SCNC: 9.3 MG/DL (ref 8.6–10.5)
CALCIUM SPEC-SCNC: 9.8 MG/DL (ref 8.6–10.5)
CANNABINOIDS SERPL QL: NEGATIVE
CERULOPLASMIN SERPL-MCNC: 23 MG/DL (ref 16–31)
CHLORIDE BLDA-SCNC: 107 MMOL/L (ref 98–107)
CHLORIDE BLDA-SCNC: 107 MMOL/L (ref 98–107)
CHLORIDE SERPL-SCNC: 100 MMOL/L (ref 98–107)
CHLORIDE SERPL-SCNC: 100 MMOL/L (ref 98–107)
CHLORIDE SERPL-SCNC: 102 MMOL/L (ref 98–107)
CHLORIDE SERPL-SCNC: 104 MMOL/L (ref 98–107)
CHLORIDE SERPL-SCNC: 98 MMOL/L (ref 98–107)
CHLORIDE SERPL-SCNC: 99 MMOL/L (ref 98–107)
CHLORIDE SERPL-SCNC: 99 MMOL/L (ref 98–107)
CHLORIDE UR-SCNC: 23 MMOL/L
CK SERPL-CCNC: 151 U/L (ref 20–200)
CLARITY UR: ABNORMAL
CO2 BLDA-SCNC: 23.3 MMOL/L (ref 23–27)
CO2 BLDA-SCNC: 24.1 MMOL/L (ref 23–27)
CO2 BLDA-SCNC: 24.7 MMOL/L (ref 23–27)
CO2 SERPL-SCNC: 18.6 MMOL/L (ref 22–29)
CO2 SERPL-SCNC: 19 MMOL/L (ref 22–29)
CO2 SERPL-SCNC: 19 MMOL/L (ref 22–29)
CO2 SERPL-SCNC: 20.1 MMOL/L (ref 22–29)
CO2 SERPL-SCNC: 22.6 MMOL/L (ref 22–29)
CO2 SERPL-SCNC: 23.8 MMOL/L (ref 22–29)
CO2 SERPL-SCNC: 24.1 MMOL/L (ref 22–29)
COCAINE UR QL: NEGATIVE
COLOR UR: ABNORMAL
CREAT BLDA-MCNC: 3.96 MG/DL (ref 0.6–130)
CREAT BLDA-MCNC: 5.47 MG/DL (ref 0.6–130)
CREAT SERPL-MCNC: 1.15 MG/DL (ref 0.76–1.27)
CREAT SERPL-MCNC: 1.74 MG/DL (ref 0.76–1.27)
CREAT SERPL-MCNC: 3.03 MG/DL (ref 0.76–1.27)
CREAT SERPL-MCNC: 4.42 MG/DL (ref 0.76–1.27)
CREAT SERPL-MCNC: 5.52 MG/DL (ref 0.76–1.27)
CREAT SERPL-MCNC: 6.01 MG/DL (ref 0.76–1.27)
CREAT SERPL-MCNC: 6.82 MG/DL (ref 0.76–1.27)
CREAT UR-MCNC: 117.2 MG/DL
CRP SERPL-MCNC: 12.04 MG/DL (ref 0–0.5)
D-LACTATE SERPL-SCNC: 1.5 MMOL/L (ref 0.5–2)
D-LACTATE SERPL-SCNC: 1.8 MMOL/L (ref 0.5–2)
D-LACTATE SERPL-SCNC: 2.7 MMOL/L (ref 0.5–2)
D-LACTATE SERPL-SCNC: 2.7 MMOL/L (ref 0.5–2)
D-LACTATE SERPL-SCNC: 3.4 MMOL/L (ref 0.5–2)
DEPRECATED RDW RBC AUTO: 54 FL (ref 37–54)
DEPRECATED RDW RBC AUTO: ABNORMAL FL
DEVICE COMMENT: ABNORMAL
DIMORPHIC RBC: PRESENT
EGFRCR SERPLBLD CKD-EPI 2021: 12.3 ML/MIN/1.73
EGFRCR SERPLBLD CKD-EPI 2021: 19.4 ML/MIN/1.73
EGFRCR SERPLBLD CKD-EPI 2021: 37.7 ML/MIN/1.73
EGFRCR SERPLBLD CKD-EPI 2021: 62 ML/MIN/1.73
EGFRCR SERPLBLD CKD-EPI 2021: 7.3 ML/MIN/1.73
EGFRCR SERPLBLD CKD-EPI 2021: 8.5 ML/MIN/1.73
EGFRCR SERPLBLD CKD-EPI 2021: 9.4 ML/MIN/1.73
EOSINOPHIL # BLD AUTO: 0.25 10*3/MM3 (ref 0–0.4)
EOSINOPHIL # BLD MANUAL: 0.23 10*3/MM3 (ref 0–0.4)
EOSINOPHIL # BLD MANUAL: 0.71 10*3/MM3 (ref 0–0.4)
EOSINOPHIL NFR BLD AUTO: 2.7 % (ref 0.3–6.2)
EOSINOPHIL NFR BLD MANUAL: 1 % (ref 0.3–6.2)
EOSINOPHIL NFR BLD MANUAL: 3 % (ref 0.3–6.2)
ERYTHROCYTE [DISTWIDTH] IN BLOOD BY AUTOMATED COUNT: 15.2 % (ref 12.3–15.4)
ERYTHROCYTE [DISTWIDTH] IN BLOOD BY AUTOMATED COUNT: ABNORMAL %
ERYTHROCYTE [SEDIMENTATION RATE] IN BLOOD: 1 MM/HR (ref 0–20)
FENTANYL UR-MCNC: NEGATIVE NG/ML
FERRITIN SERPL-MCNC: 6892 NG/ML (ref 30–400)
FLUAV SUBTYP SPEC NAA+PROBE: NOT DETECTED
FLUBV RNA ISLT QL NAA+PROBE: NOT DETECTED
GAMMA GLOB SERPL ELPH-MCNC: 1.2 G/DL (ref 0.4–1.8)
GAS FLOW AIRWAY: 2 LPM
GAS FLOW AIRWAY: 4 LPM
GBM AB SER IA-ACNC: <0.2 UNITS (ref 0–0.9)
GEN 5 2HR TROPONIN T REFLEX: 35 NG/L
GLOBULIN SER-MCNC: 2.6 G/DL (ref 2.2–3.9)
GLOBULIN UR ELPH-MCNC: 2.8 GM/DL
GLOBULIN UR ELPH-MCNC: 3.1 GM/DL
GLOBULIN UR ELPH-MCNC: 3.1 GM/DL
GLOBULIN UR ELPH-MCNC: 3.5 GM/DL
GLUCOSE BLDC GLUCOMTR-MCNC: 102 MG/DL (ref 70–130)
GLUCOSE BLDC GLUCOMTR-MCNC: 112 MG/DL (ref 70–130)
GLUCOSE BLDC GLUCOMTR-MCNC: 119 MG/DL (ref 70–130)
GLUCOSE BLDC GLUCOMTR-MCNC: 133 MG/DL (ref 70–130)
GLUCOSE BLDC GLUCOMTR-MCNC: 142 MG/DL (ref 70–130)
GLUCOSE BLDC GLUCOMTR-MCNC: 163 MG/DL (ref 70–130)
GLUCOSE BLDC GLUCOMTR-MCNC: 184 MG/DL (ref 70–130)
GLUCOSE BLDC GLUCOMTR-MCNC: 225 MG/DL (ref 70–130)
GLUCOSE BLDC GLUCOMTR-MCNC: 79 MG/DL (ref 70–130)
GLUCOSE SERPL-MCNC: 101 MG/DL (ref 65–99)
GLUCOSE SERPL-MCNC: 106 MG/DL (ref 65–99)
GLUCOSE SERPL-MCNC: 113 MG/DL (ref 65–99)
GLUCOSE SERPL-MCNC: 122 MG/DL (ref 65–99)
GLUCOSE SERPL-MCNC: 153 MG/DL (ref 65–99)
GLUCOSE SERPL-MCNC: 184 MG/DL (ref 65–99)
GLUCOSE SERPL-MCNC: 82 MG/DL (ref 65–99)
GLUCOSE UR STRIP-MCNC: ABNORMAL MG/DL
HAPTOGLOB SERPL-MCNC: <10 MG/DL (ref 30–200)
HAV IGM SERPL QL IA: NORMAL
HBA1C MFR BLD: 5.7 % (ref 4.8–5.6)
HBV CORE IGM SERPL QL IA: NORMAL
HBV SURFACE AG SERPL QL IA: NORMAL
HCO3 BLDA-SCNC: 23.6 MMOL/L (ref 22–28)
HCO3 BLDA-SCNC: 23.6 MMOL/L (ref 22–28)
HCO3 BLDA-SCNC: 24.4 MMOL/L (ref 22–28)
HCT VFR BLD AUTO: 23 % (ref 37.5–51)
HCT VFR BLD AUTO: 32 % (ref 37.5–51)
HCT VFR BLD AUTO: 34 % (ref 37.5–51)
HCT VFR BLD AUTO: 38 % (ref 37.5–51)
HCT VFR BLD AUTO: 44.4 % (ref 37.5–51)
HCV AB SER DONR QL: NORMAL
HEMODILUTION: NO
HGB BLD-MCNC: 10.6 G/DL (ref 13–17.7)
HGB BLD-MCNC: 11.3 G/DL (ref 13–17.7)
HGB BLD-MCNC: 12.6 G/DL (ref 13–17.7)
HGB BLD-MCNC: 13.7 G/DL (ref 13–17.7)
HGB BLD-MCNC: 9.4 G/DL (ref 13–17.7)
HGB UR QL STRIP.AUTO: ABNORMAL
HYALINE CASTS UR QL AUTO: ABNORMAL /LPF
HYPOCHROMIA BLD QL: ABNORMAL
IGA SERPL-MCNC: 269 MG/DL (ref 61–437)
IGG SERPL-MCNC: 1349 MG/DL (ref 603–1613)
IGM SERPL-MCNC: 69 MG/DL (ref 15–143)
IMM GRANULOCYTES # BLD AUTO: 0.01 10*3/MM3 (ref 0–0.05)
IMM GRANULOCYTES NFR BLD AUTO: 0.1 % (ref 0–0.5)
INHALED O2 CONCENTRATION: 21 %
INR PPP: 1.5 (ref 0.9–1.1)
INR PPP: 2.04 (ref 0.9–1.1)
INTERPRETATION SERPL IEP-IMP: ABNORMAL
IRON 24H UR-MRATE: 196 MCG/DL (ref 59–158)
IRON SATN MFR SERPL: >101 % (ref 20–50)
KETONES UR QL STRIP: ABNORMAL
LAB AP CASE REPORT: NORMAL
LABORATORY COMMENT REPORT: ABNORMAL
LDH SERPL-CCNC: >2500 U/L (ref 135–225)
LEUKOCYTE ESTERASE UR QL STRIP.AUTO: ABNORMAL
LYMPHOCYTES # BLD AUTO: 2.34 10*3/MM3 (ref 0.7–3.1)
LYMPHOCYTES # BLD MANUAL: 0.38 10*3/MM3 (ref 0.7–3.1)
LYMPHOCYTES # BLD MANUAL: 0.47 10*3/MM3 (ref 0.7–3.1)
LYMPHOCYTES # BLD MANUAL: 1.82 10*3/MM3 (ref 0.7–3.1)
LYMPHOCYTES # BLD MANUAL: 3.8 10*3/MM3 (ref 0.7–3.1)
LYMPHOCYTES NFR BLD AUTO: 25 % (ref 19.6–45.3)
LYMPHOCYTES NFR BLD MANUAL: 2 % (ref 5–12)
LYMPHOCYTES NFR BLD MANUAL: 3 % (ref 5–12)
LYMPHOCYTES NFR BLD MANUAL: 5 % (ref 5–12)
LYMPHOCYTES NFR BLD MANUAL: 6 % (ref 5–12)
Lab: ABNORMAL
M PROTEIN SERPL ELPH-MCNC: ABNORMAL G/DL
MACROCYTES BLD QL SMEAR: ABNORMAL
MAGNESIUM SERPL-MCNC: 1.8 MG/DL (ref 1.6–2.4)
MCH RBC QN AUTO: 29.6 PG (ref 26.6–33)
MCH RBC QN AUTO: 37.7 PG (ref 26.6–33)
MCH RBC QN AUTO: 40.7 PG (ref 26.6–33)
MCH RBC QN AUTO: 48.4 PG (ref 26.6–33)
MCH RBC QN AUTO: 49.6 PG (ref 26.6–33)
MCHC RBC AUTO-ENTMCNC: 30.9 G/DL (ref 31.5–35.7)
MCHC RBC AUTO-ENTMCNC: 33.1 G/DL (ref 31.5–35.7)
MCHC RBC AUTO-ENTMCNC: 33.1 G/DL (ref 31.5–35.7)
MCHC RBC AUTO-ENTMCNC: 33.2 G/DL (ref 31.5–35.7)
MCHC RBC AUTO-ENTMCNC: 40.9 G/DL (ref 31.5–35.7)
MCV RBC AUTO: 113.8 FL (ref 79–97)
MCV RBC AUTO: 145.9 FL (ref 79–97)
MCV RBC AUTO: 149.6 FL (ref 79–97)
MCV RBC AUTO: 95.9 FL (ref 79–97)
MCV RBC AUTO: 99.6 FL (ref 79–97)
METAMYELOCYTES NFR BLD MANUAL: 1 % (ref 0–0)
METHADONE UR QL SCN: NEGATIVE
MODALITY: ABNORMAL
MONOCYTES # BLD AUTO: 0.43 10*3/MM3 (ref 0.1–0.9)
MONOCYTES # BLD: 0.47 10*3/MM3 (ref 0.1–0.9)
MONOCYTES # BLD: 0.68 10*3/MM3 (ref 0.1–0.9)
MONOCYTES # BLD: 0.96 10*3/MM3 (ref 0.1–0.9)
MONOCYTES # BLD: 1.42 10*3/MM3 (ref 0.1–0.9)
MONOCYTES NFR BLD AUTO: 4.6 % (ref 5–12)
MRSA DNA SPEC QL NAA+PROBE: NORMAL
MYELOCYTES NFR BLD MANUAL: 2 % (ref 0–0)
NEUTROPHILS # BLD AUTO: 17.09 10*3/MM3 (ref 1.7–7)
NEUTROPHILS # BLD AUTO: 17.79 10*3/MM3 (ref 1.7–7)
NEUTROPHILS # BLD AUTO: 20.24 10*3/MM3 (ref 1.7–7)
NEUTROPHILS # BLD AUTO: 22.27 10*3/MM3 (ref 1.7–7)
NEUTROPHILS NFR BLD AUTO: 6.31 10*3/MM3 (ref 1.7–7)
NEUTROPHILS NFR BLD AUTO: 67.3 % (ref 42.7–76)
NEUTROPHILS NFR BLD MANUAL: 72 % (ref 42.7–76)
NEUTROPHILS NFR BLD MANUAL: 89 % (ref 42.7–76)
NEUTROPHILS NFR BLD MANUAL: 93 % (ref 42.7–76)
NEUTROPHILS NFR BLD MANUAL: 95 % (ref 42.7–76)
NITRITE UR QL STRIP: NEGATIVE
NOTIFIED WHO: ABNORMAL
NRBC BLD AUTO-RTO: 4.4 /100 WBC (ref 0–0.2)
NRBC SPEC MANUAL: 1 /100 WBC (ref 0–0.2)
NRBC SPEC MANUAL: 17 /100 WBC (ref 0–0.2)
NRBC SPEC MANUAL: 3 /100 WBC (ref 0–0.2)
NT-PROBNP SERPL-MCNC: 3616 PG/ML (ref 0–1800)
NT-PROBNP SERPL-MCNC: 4668 PG/ML (ref 0–1800)
NT-PROBNP SERPL-MCNC: 496.7 PG/ML (ref 0–1800)
O2 A-A PPRESDIFF RESPIRATORY: 0.4 MMHG
OPIATES UR QL: POSITIVE
OSMOLALITY UR: 335 MOSM/KG
OXYCODONE UR QL SCN: NEGATIVE
PATH REPORT.FINAL DX SPEC: NORMAL
PATHOLOGY REVIEW: YES
PCO2 BLDA: 29.6 MM HG (ref 35–45)
PCO2 BLDA: 30.2 MM HG (ref 35–45)
PCO2 BLDA: 35.1 MM HG (ref 35–45)
PH BLDA: 7.43 PH UNITS (ref 7.35–7.45)
PH BLDA: 7.51 PH UNITS (ref 7.35–7.45)
PH BLDA: 7.51 PH UNITS (ref 7.35–7.45)
PH UR STRIP.AUTO: <=5 [PH] (ref 5–8)
PHOSPHATE SERPL-MCNC: 5.5 MG/DL (ref 2.5–4.5)
PLAT MORPH BLD: NORMAL
PLATELET # BLD AUTO: 217 10*3/MM3 (ref 140–450)
PLATELET # BLD AUTO: 256 10*3/MM3 (ref 140–450)
PLATELET # BLD AUTO: 273 10*3/MM3 (ref 140–450)
PLATELET # BLD AUTO: 301 10*3/MM3 (ref 140–450)
PLATELET # BLD AUTO: 307 10*3/MM3 (ref 140–450)
PMV BLD AUTO: 10.4 FL (ref 6–12)
PMV BLD AUTO: 10.5 FL (ref 6–12)
PMV BLD AUTO: 11 FL (ref 6–12)
PMV BLD AUTO: 11 FL (ref 6–12)
PMV BLD AUTO: 11.2 FL (ref 6–12)
PO2 BLDA: 51.8 MM HG (ref 80–100)
PO2 BLDA: 76.5 MM HG (ref 80–100)
PO2 BLDA: 78.8 MM HG (ref 80–100)
POTASSIUM BLDA-SCNC: 4.1 MMOL/L (ref 3.5–5.2)
POTASSIUM BLDA-SCNC: 4.3 MMOL/L (ref 3.5–5.2)
POTASSIUM SERPL-SCNC: 3.5 MMOL/L (ref 3.5–5.2)
POTASSIUM SERPL-SCNC: 4 MMOL/L (ref 3.5–5.2)
POTASSIUM SERPL-SCNC: 4.4 MMOL/L (ref 3.5–5.2)
POTASSIUM SERPL-SCNC: 4.6 MMOL/L (ref 3.5–5.2)
POTASSIUM SERPL-SCNC: 4.6 MMOL/L (ref 3.5–5.2)
POTASSIUM SERPL-SCNC: 4.7 MMOL/L (ref 3.5–5.2)
POTASSIUM SERPL-SCNC: 5.8 MMOL/L (ref 3.5–5.2)
PROCALCITONIN SERPL-MCNC: 1.01 NG/ML (ref 0–0.25)
PROCALCITONIN SERPL-MCNC: 16.9 NG/ML (ref 0–0.25)
PROT ?TM UR-MCNC: 264.4 MG/DL
PROT ?TM UR-MCNC: 264.4 MG/DL
PROT SERPL-MCNC: 5.3 G/DL (ref 6–8.5)
PROT SERPL-MCNC: 5.5 G/DL (ref 6–8.5)
PROT SERPL-MCNC: 5.8 G/DL (ref 6–8.5)
PROT SERPL-MCNC: 5.9 G/DL (ref 6–8.5)
PROT SERPL-MCNC: 7.6 G/DL (ref 6–8.5)
PROT UR QL STRIP: ABNORMAL
PROT/CREAT UR: 2256 MG/G CREA (ref 0–200)
PROTHROMBIN TIME: 18.3 SECONDS (ref 11.7–14.2)
PROTHROMBIN TIME: 23.4 SECONDS (ref 11.7–14.2)
QT INTERVAL: 390 MS
QT INTERVAL: 433 MS
QTC INTERVAL: 437 MS
QTC INTERVAL: 475 MS
RBC # BLD AUTO: 2.31 10*6/MM3 (ref 4.14–5.8)
RBC # BLD AUTO: 2.33 10*6/MM3 (ref 4.14–5.8)
RBC # BLD AUTO: 2.54 10*6/MM3 (ref 4.14–5.8)
RBC # BLD AUTO: 2.81 10*6/MM3 (ref 4.14–5.8)
RBC # BLD AUTO: 4.63 10*6/MM3 (ref 4.14–5.8)
RBC # UR STRIP: ABNORMAL /HPF
RBC MORPH BLD: NORMAL
READ BACK: YES
REF LAB TEST METHOD: ABNORMAL
RETICS # AUTO: 0.04 10*6/MM3 (ref 0.02–0.13)
RETICS/RBC NFR AUTO: 1.07 % (ref 0.7–1.9)
SAO2 % BLDCOA: 90.1 % (ref 92–98.5)
SAO2 % BLDCOA: 95.7 % (ref 92–98.5)
SAO2 % BLDCOA: 96.9 % (ref 92–98.5)
SARS-COV-2 RNA RESP QL NAA+PROBE: NOT DETECTED
SMA IGG SER-ACNC: 17 UNITS (ref 0–19)
SODIUM BLD-SCNC: 140 MMOL/L (ref 136–145)
SODIUM BLD-SCNC: 141 MMOL/L (ref 136–145)
SODIUM SERPL-SCNC: 139 MMOL/L (ref 136–145)
SODIUM SERPL-SCNC: 141 MMOL/L (ref 136–145)
SODIUM SERPL-SCNC: 141 MMOL/L (ref 136–145)
SODIUM SERPL-SCNC: 142 MMOL/L (ref 136–145)
SODIUM SERPL-SCNC: 144 MMOL/L (ref 136–145)
SODIUM SERPL-SCNC: 144 MMOL/L (ref 136–145)
SODIUM SERPL-SCNC: 145 MMOL/L (ref 136–145)
SODIUM UR-SCNC: 45 MMOL/L
SP GR UR STRIP: >=1.03 (ref 1–1.03)
SQUAMOUS #/AREA URNS HPF: ABNORMAL /HPF
TIBC SERPL-MCNC: 180 MCG/DL (ref 298–536)
TOTAL RATE: 20 BREATHS/MINUTE
TOTAL RATE: 20 BREATHS/MINUTE
TOTAL RATE: 25 BREATHS/MINUTE
TRANSFERRIN SERPL-MCNC: 121 MG/DL (ref 200–360)
TROPONIN T DELTA: 4 NG/L
TROPONIN T SERPL HS-MCNC: 28 NG/L
TROPONIN T SERPL HS-MCNC: 31 NG/L
TROPONIN T SERPL HS-MCNC: 78 NG/L
URATE SERPL-MCNC: 14.9 MG/DL (ref 3.4–7)
UROBILINOGEN UR QL STRIP: ABNORMAL
VARIANT LYMPHS NFR BLD MANUAL: 16 % (ref 19.6–45.3)
VARIANT LYMPHS NFR BLD MANUAL: 2 % (ref 19.6–45.3)
VARIANT LYMPHS NFR BLD MANUAL: 2 % (ref 19.6–45.3)
VARIANT LYMPHS NFR BLD MANUAL: 8 % (ref 19.6–45.3)
WBC # UR STRIP: ABNORMAL /HPF
WBC MORPH BLD: NORMAL
WBC NRBC COR # BLD: 19.13 10*3/MM3 (ref 3.4–10.8)
WBC NRBC COR # BLD: 22.74 10*3/MM3 (ref 3.4–10.8)
WBC NRBC COR # BLD: 23.44 10*3/MM3 (ref 3.4–10.8)
WBC NRBC COR # BLD: 23.73 10*3/MM3 (ref 3.4–10.8)
WBC NRBC COR # BLD: 9.37 10*3/MM3 (ref 3.4–10.8)

## 2023-01-01 PROCEDURE — 83540 ASSAY OF IRON: CPT | Performed by: HOSPITALIST

## 2023-01-01 PROCEDURE — 86235 NUCLEAR ANTIGEN ANTIBODY: CPT | Performed by: INTERNAL MEDICINE

## 2023-01-01 PROCEDURE — 85007 BL SMEAR W/DIFF WBC COUNT: CPT | Performed by: HOSPITALIST

## 2023-01-01 PROCEDURE — 84145 PROCALCITONIN (PCT): CPT | Performed by: HOSPITALIST

## 2023-01-01 PROCEDURE — 80047 BASIC METABLC PNL IONIZED CA: CPT

## 2023-01-01 PROCEDURE — 84156 ASSAY OF PROTEIN URINE: CPT | Performed by: INTERNAL MEDICINE

## 2023-01-01 PROCEDURE — 94799 UNLISTED PULMONARY SVC/PX: CPT

## 2023-01-01 PROCEDURE — 85610 PROTHROMBIN TIME: CPT | Performed by: INTERNAL MEDICINE

## 2023-01-01 PROCEDURE — 86037 ANCA TITER EACH ANTIBODY: CPT | Performed by: INTERNAL MEDICINE

## 2023-01-01 PROCEDURE — 93005 ELECTROCARDIOGRAM TRACING: CPT | Performed by: HOSPITALIST

## 2023-01-01 PROCEDURE — 83516 IMMUNOASSAY NONANTIBODY: CPT | Performed by: INTERNAL MEDICINE

## 2023-01-01 PROCEDURE — 84466 ASSAY OF TRANSFERRIN: CPT | Performed by: HOSPITALIST

## 2023-01-01 PROCEDURE — 83880 ASSAY OF NATRIURETIC PEPTIDE: CPT | Performed by: EMERGENCY MEDICINE

## 2023-01-01 PROCEDURE — 93325 DOPPLER ECHO COLOR FLOW MAPG: CPT

## 2023-01-01 PROCEDURE — 84100 ASSAY OF PHOSPHORUS: CPT | Performed by: INTERNAL MEDICINE

## 2023-01-01 PROCEDURE — 93321 DOPPLER ECHO F-UP/LMTD STD: CPT

## 2023-01-01 PROCEDURE — 94640 AIRWAY INHALATION TREATMENT: CPT

## 2023-01-01 PROCEDURE — 83605 ASSAY OF LACTIC ACID: CPT

## 2023-01-01 PROCEDURE — 25010000002 HEPARIN (PORCINE) PER 1000 UNITS: Performed by: HOSPITALIST

## 2023-01-01 PROCEDURE — 94761 N-INVAS EAR/PLS OXIMETRY MLT: CPT

## 2023-01-01 PROCEDURE — 85045 AUTOMATED RETICULOCYTE COUNT: CPT | Performed by: STUDENT IN AN ORGANIZED HEALTH CARE EDUCATION/TRAINING PROGRAM

## 2023-01-01 PROCEDURE — 82784 ASSAY IGA/IGD/IGG/IGM EACH: CPT | Performed by: INTERNAL MEDICINE

## 2023-01-01 PROCEDURE — 84484 ASSAY OF TROPONIN QUANT: CPT | Performed by: EMERGENCY MEDICINE

## 2023-01-01 PROCEDURE — 86160 COMPLEMENT ANTIGEN: CPT | Performed by: INTERNAL MEDICINE

## 2023-01-01 PROCEDURE — 99222 1ST HOSP IP/OBS MODERATE 55: CPT | Performed by: INTERNAL MEDICINE

## 2023-01-01 PROCEDURE — 92610 EVALUATE SWALLOWING FUNCTION: CPT

## 2023-01-01 PROCEDURE — 82728 ASSAY OF FERRITIN: CPT | Performed by: STUDENT IN AN ORGANIZED HEALTH CARE EDUCATION/TRAINING PROGRAM

## 2023-01-01 PROCEDURE — 83036 HEMOGLOBIN GLYCOSYLATED A1C: CPT | Performed by: INTERNAL MEDICINE

## 2023-01-01 PROCEDURE — 85025 COMPLETE CBC W/AUTO DIFF WBC: CPT | Performed by: HOSPITALIST

## 2023-01-01 PROCEDURE — 25010000002 ENOXAPARIN PER 10 MG: Performed by: HOSPITALIST

## 2023-01-01 PROCEDURE — 82803 BLOOD GASES ANY COMBINATION: CPT

## 2023-01-01 PROCEDURE — 82948 REAGENT STRIP/BLOOD GLUCOSE: CPT

## 2023-01-01 PROCEDURE — 83880 ASSAY OF NATRIURETIC PEPTIDE: CPT | Performed by: HOSPITALIST

## 2023-01-01 PROCEDURE — 86038 ANTINUCLEAR ANTIBODIES: CPT | Performed by: INTERNAL MEDICINE

## 2023-01-01 PROCEDURE — 93325 DOPPLER ECHO COLOR FLOW MAPG: CPT | Performed by: INTERNAL MEDICINE

## 2023-01-01 PROCEDURE — 86140 C-REACTIVE PROTEIN: CPT | Performed by: STUDENT IN AN ORGANIZED HEALTH CARE EDUCATION/TRAINING PROGRAM

## 2023-01-01 PROCEDURE — 93975 VASCULAR STUDY: CPT

## 2023-01-01 PROCEDURE — 25010000002 PIPERACILLIN SOD-TAZOBACTAM PER 1 G: Performed by: INTERNAL MEDICINE

## 2023-01-01 PROCEDURE — 83880 ASSAY OF NATRIURETIC PEPTIDE: CPT | Performed by: INTERNAL MEDICINE

## 2023-01-01 PROCEDURE — 76775 US EXAM ABDO BACK WALL LIM: CPT

## 2023-01-01 PROCEDURE — 80053 COMPREHEN METABOLIC PANEL: CPT | Performed by: EMERGENCY MEDICINE

## 2023-01-01 PROCEDURE — 85652 RBC SED RATE AUTOMATED: CPT | Performed by: STUDENT IN AN ORGANIZED HEALTH CARE EDUCATION/TRAINING PROGRAM

## 2023-01-01 PROCEDURE — 81001 URINALYSIS AUTO W/SCOPE: CPT | Performed by: HOSPITALIST

## 2023-01-01 PROCEDURE — 93010 ELECTROCARDIOGRAM REPORT: CPT | Performed by: INTERNAL MEDICINE

## 2023-01-01 PROCEDURE — 86225 DNA ANTIBODY NATIVE: CPT | Performed by: INTERNAL MEDICINE

## 2023-01-01 PROCEDURE — 74176 CT ABD & PELVIS W/O CONTRAST: CPT

## 2023-01-01 PROCEDURE — 83605 ASSAY OF LACTIC ACID: CPT | Performed by: EMERGENCY MEDICINE

## 2023-01-01 PROCEDURE — 80307 DRUG TEST PRSMV CHEM ANLYZR: CPT | Performed by: HOSPITALIST

## 2023-01-01 PROCEDURE — 94760 N-INVAS EAR/PLS OXIMETRY 1: CPT

## 2023-01-01 PROCEDURE — 71250 CT THORAX DX C-: CPT

## 2023-01-01 PROCEDURE — 97530 THERAPEUTIC ACTIVITIES: CPT

## 2023-01-01 PROCEDURE — 82570 ASSAY OF URINE CREATININE: CPT | Performed by: INTERNAL MEDICINE

## 2023-01-01 PROCEDURE — 80053 COMPREHEN METABOLIC PANEL: CPT | Performed by: HOSPITALIST

## 2023-01-01 PROCEDURE — 86334 IMMUNOFIX E-PHORESIS SERUM: CPT | Performed by: INTERNAL MEDICINE

## 2023-01-01 PROCEDURE — 84484 ASSAY OF TROPONIN QUANT: CPT | Performed by: HOSPITALIST

## 2023-01-01 PROCEDURE — 36600 WITHDRAWAL OF ARTERIAL BLOOD: CPT

## 2023-01-01 PROCEDURE — 25010000002 LORAZEPAM PER 2 MG: Performed by: INTERNAL MEDICINE

## 2023-01-01 PROCEDURE — 80053 COMPREHEN METABOLIC PANEL: CPT | Performed by: INTERNAL MEDICINE

## 2023-01-01 PROCEDURE — 25010000002 HYDROMORPHONE 1 MG/ML SOLUTION: Performed by: INTERNAL MEDICINE

## 2023-01-01 PROCEDURE — 80048 BASIC METABOLIC PNL TOTAL CA: CPT | Performed by: HOSPITALIST

## 2023-01-01 PROCEDURE — 85610 PROTHROMBIN TIME: CPT | Performed by: HOSPITALIST

## 2023-01-01 PROCEDURE — 25010000002 CEFTRIAXONE PER 250 MG: Performed by: EMERGENCY MEDICINE

## 2023-01-01 PROCEDURE — 25010000002 AZITHROMYCIN PER 500 MG: Performed by: EMERGENCY MEDICINE

## 2023-01-01 PROCEDURE — 92526 ORAL FUNCTION THERAPY: CPT

## 2023-01-01 PROCEDURE — 82140 ASSAY OF AMMONIA: CPT | Performed by: HOSPITALIST

## 2023-01-01 PROCEDURE — 85025 COMPLETE CBC W/AUTO DIFF WBC: CPT | Performed by: EMERGENCY MEDICINE

## 2023-01-01 PROCEDURE — 87636 SARSCOV2 & INF A&B AMP PRB: CPT | Performed by: EMERGENCY MEDICINE

## 2023-01-01 PROCEDURE — 25510000001 PERFLUTREN (DEFINITY) 8.476 MG IN SODIUM CHLORIDE (PF) 0.9 % 10 ML INJECTION: Performed by: INTERNAL MEDICINE

## 2023-01-01 PROCEDURE — 36415 COLL VENOUS BLD VENIPUNCTURE: CPT

## 2023-01-01 PROCEDURE — 71045 X-RAY EXAM CHEST 1 VIEW: CPT

## 2023-01-01 PROCEDURE — 82248 BILIRUBIN DIRECT: CPT | Performed by: HOSPITALIST

## 2023-01-01 PROCEDURE — 80143 DRUG ASSAY ACETAMINOPHEN: CPT | Performed by: HOSPITALIST

## 2023-01-01 PROCEDURE — 83935 ASSAY OF URINE OSMOLALITY: CPT | Performed by: INTERNAL MEDICINE

## 2023-01-01 PROCEDURE — 86015 ACTIN ANTIBODY EACH: CPT | Performed by: HOSPITALIST

## 2023-01-01 PROCEDURE — 25010000002 THIAMINE HCL 200 MG/2ML SOLUTION: Performed by: INTERNAL MEDICINE

## 2023-01-01 PROCEDURE — 63710000001 INSULIN REGULAR HUMAN PER 5 UNITS: Performed by: HOSPITALIST

## 2023-01-01 PROCEDURE — 84550 ASSAY OF BLOOD/URIC ACID: CPT | Performed by: STUDENT IN AN ORGANIZED HEALTH CARE EDUCATION/TRAINING PROGRAM

## 2023-01-01 PROCEDURE — 93321 DOPPLER ECHO F-UP/LMTD STD: CPT | Performed by: INTERNAL MEDICINE

## 2023-01-01 PROCEDURE — 87040 BLOOD CULTURE FOR BACTERIA: CPT | Performed by: HOSPITALIST

## 2023-01-01 PROCEDURE — 94664 DEMO&/EVAL PT USE INHALER: CPT

## 2023-01-01 PROCEDURE — 82247 BILIRUBIN TOTAL: CPT | Performed by: HOSPITALIST

## 2023-01-01 PROCEDURE — 25010000002 FUROSEMIDE PER 20 MG: Performed by: EMERGENCY MEDICINE

## 2023-01-01 PROCEDURE — 25010000002 ADENOSINE PER 6 MG

## 2023-01-01 PROCEDURE — 83615 LACTATE (LD) (LDH) ENZYME: CPT | Performed by: HOSPITALIST

## 2023-01-01 PROCEDURE — 93308 TTE F-UP OR LMTD: CPT | Performed by: INTERNAL MEDICINE

## 2023-01-01 PROCEDURE — 76705 ECHO EXAM OF ABDOMEN: CPT

## 2023-01-01 PROCEDURE — 93005 ELECTROCARDIOGRAM TRACING: CPT | Performed by: EMERGENCY MEDICINE

## 2023-01-01 PROCEDURE — 84165 PROTEIN E-PHORESIS SERUM: CPT | Performed by: INTERNAL MEDICINE

## 2023-01-01 PROCEDURE — 84300 ASSAY OF URINE SODIUM: CPT | Performed by: HOSPITALIST

## 2023-01-01 PROCEDURE — 87040 BLOOD CULTURE FOR BACTERIA: CPT | Performed by: EMERGENCY MEDICINE

## 2023-01-01 PROCEDURE — 82390 ASSAY OF CERULOPLASMIN: CPT | Performed by: HOSPITALIST

## 2023-01-01 PROCEDURE — 80048 BASIC METABOLIC PNL TOTAL CA: CPT | Performed by: INTERNAL MEDICINE

## 2023-01-01 PROCEDURE — 83735 ASSAY OF MAGNESIUM: CPT | Performed by: INTERNAL MEDICINE

## 2023-01-01 PROCEDURE — 80074 ACUTE HEPATITIS PANEL: CPT | Performed by: HOSPITALIST

## 2023-01-01 PROCEDURE — 83605 ASSAY OF LACTIC ACID: CPT | Performed by: HOSPITALIST

## 2023-01-01 PROCEDURE — 99285 EMERGENCY DEPT VISIT HI MDM: CPT

## 2023-01-01 PROCEDURE — 99232 SBSQ HOSP IP/OBS MODERATE 35: CPT | Performed by: INTERNAL MEDICINE

## 2023-01-01 PROCEDURE — 93308 TTE F-UP OR LMTD: CPT

## 2023-01-01 PROCEDURE — 82436 ASSAY OF URINE CHLORIDE: CPT | Performed by: HOSPITALIST

## 2023-01-01 PROCEDURE — 83010 ASSAY OF HAPTOGLOBIN QUANT: CPT | Performed by: INTERNAL MEDICINE

## 2023-01-01 PROCEDURE — 82330 ASSAY OF CALCIUM: CPT | Performed by: INTERNAL MEDICINE

## 2023-01-01 PROCEDURE — 82550 ASSAY OF CK (CPK): CPT | Performed by: INTERNAL MEDICINE

## 2023-01-01 PROCEDURE — 71046 X-RAY EXAM CHEST 2 VIEWS: CPT

## 2023-01-01 PROCEDURE — 87641 MR-STAPH DNA AMP PROBE: CPT | Performed by: HOSPITALIST

## 2023-01-01 PROCEDURE — 97161 PT EVAL LOW COMPLEX 20 MIN: CPT

## 2023-01-01 RX ORDER — PRIMIDONE 50 MG/1
50 TABLET ORAL NIGHTLY
Status: DISCONTINUED | OUTPATIENT
Start: 2023-01-01 | End: 2023-01-01

## 2023-01-01 RX ORDER — GLYCOPYRROLATE 0.2 MG/ML
0.2 INJECTION INTRAMUSCULAR; INTRAVENOUS
Status: DISCONTINUED | OUTPATIENT
Start: 2023-01-01 | End: 2023-01-01 | Stop reason: HOSPADM

## 2023-01-01 RX ORDER — ENOXAPARIN SODIUM 100 MG/ML
30 INJECTION SUBCUTANEOUS EVERY 24 HOURS
Status: DISCONTINUED | OUTPATIENT
Start: 2023-01-01 | End: 2023-01-01

## 2023-01-01 RX ORDER — IPRATROPIUM BROMIDE AND ALBUTEROL SULFATE 2.5; .5 MG/3ML; MG/3ML
3 SOLUTION RESPIRATORY (INHALATION)
Status: DISCONTINUED | OUTPATIENT
Start: 2023-01-01 | End: 2023-01-01

## 2023-01-01 RX ORDER — THIAMINE HYDROCHLORIDE 100 MG/ML
200 INJECTION, SOLUTION INTRAMUSCULAR; INTRAVENOUS DAILY
Status: DISCONTINUED | OUTPATIENT
Start: 2023-01-01 | End: 2023-01-01

## 2023-01-01 RX ORDER — SODIUM CHLORIDE 9 MG/ML
50 INJECTION, SOLUTION INTRAVENOUS CONTINUOUS
Status: DISCONTINUED | OUTPATIENT
Start: 2023-01-01 | End: 2023-01-01

## 2023-01-01 RX ORDER — DEXTROSE MONOHYDRATE 25 G/50ML
25 INJECTION, SOLUTION INTRAVENOUS ONCE
Status: COMPLETED | OUTPATIENT
Start: 2023-01-01 | End: 2023-01-01

## 2023-01-01 RX ORDER — DIPHENHYDRAMINE HYDROCHLORIDE 50 MG/ML
25 INJECTION INTRAMUSCULAR; INTRAVENOUS EVERY 6 HOURS PRN
Status: DISCONTINUED | OUTPATIENT
Start: 2023-01-01 | End: 2023-01-01 | Stop reason: HOSPADM

## 2023-01-01 RX ORDER — SODIUM CHLORIDE 0.9 % (FLUSH) 0.9 %
10 SYRINGE (ML) INJECTION EVERY 12 HOURS SCHEDULED
Status: DISCONTINUED | OUTPATIENT
Start: 2023-01-01 | End: 2023-01-01

## 2023-01-01 RX ORDER — SODIUM CHLORIDE 0.9 % (FLUSH) 0.9 %
10 SYRINGE (ML) INJECTION AS NEEDED
Status: DISCONTINUED | OUTPATIENT
Start: 2023-01-01 | End: 2023-01-01

## 2023-01-01 RX ORDER — ADENOSINE 3 MG/ML
6 INJECTION, SOLUTION INTRAVENOUS ONCE
Status: COMPLETED | OUTPATIENT
Start: 2023-01-01 | End: 2023-01-01

## 2023-01-01 RX ORDER — LORAZEPAM 2 MG/ML
2 CONCENTRATE ORAL
Status: DISCONTINUED | OUTPATIENT
Start: 2023-01-01 | End: 2023-01-01 | Stop reason: HOSPADM

## 2023-01-01 RX ORDER — SODIUM CHLORIDE 9 MG/ML
80 INJECTION, SOLUTION INTRAVENOUS CONTINUOUS
Status: DISCONTINUED | OUTPATIENT
Start: 2023-01-01 | End: 2023-01-01

## 2023-01-01 RX ORDER — AMOXICILLIN 250 MG
2 CAPSULE ORAL 2 TIMES DAILY
Status: DISCONTINUED | OUTPATIENT
Start: 2023-01-01 | End: 2023-01-01

## 2023-01-01 RX ORDER — DIPHENOXYLATE HYDROCHLORIDE AND ATROPINE SULFATE 2.5; .025 MG/1; MG/1
1 TABLET ORAL
Status: DISCONTINUED | OUTPATIENT
Start: 2023-01-01 | End: 2023-01-01 | Stop reason: HOSPADM

## 2023-01-01 RX ORDER — LORAZEPAM 2 MG/ML
0.5 INJECTION INTRAMUSCULAR
Status: DISCONTINUED | OUTPATIENT
Start: 2023-01-01 | End: 2023-01-01 | Stop reason: HOSPADM

## 2023-01-01 RX ORDER — PROCHLORPERAZINE MALEATE 10 MG
5 TABLET ORAL EVERY 6 HOURS PRN
Status: DISCONTINUED | OUTPATIENT
Start: 2023-01-01 | End: 2023-01-01 | Stop reason: HOSPADM

## 2023-01-01 RX ORDER — LORAZEPAM 2 MG/ML
1 INJECTION INTRAMUSCULAR
Status: DISCONTINUED | OUTPATIENT
Start: 2023-01-01 | End: 2023-01-01 | Stop reason: HOSPADM

## 2023-01-01 RX ORDER — DIPHENHYDRAMINE HCL 25 MG
25 CAPSULE ORAL EVERY 6 HOURS PRN
Status: DISCONTINUED | OUTPATIENT
Start: 2023-01-01 | End: 2023-01-01 | Stop reason: HOSPADM

## 2023-01-01 RX ORDER — SODIUM CHLORIDE, SODIUM LACTATE, POTASSIUM CHLORIDE, CALCIUM CHLORIDE 600; 310; 30; 20 MG/100ML; MG/100ML; MG/100ML; MG/100ML
75 INJECTION, SOLUTION INTRAVENOUS CONTINUOUS
Status: DISCONTINUED | OUTPATIENT
Start: 2023-01-01 | End: 2023-01-01

## 2023-01-01 RX ORDER — SODIUM CHLORIDE 9 MG/ML
40 INJECTION, SOLUTION INTRAVENOUS AS NEEDED
Status: DISCONTINUED | OUTPATIENT
Start: 2023-01-01 | End: 2023-01-01

## 2023-01-01 RX ORDER — LORAZEPAM 2 MG/ML
2 INJECTION INTRAMUSCULAR
Status: DISCONTINUED | OUTPATIENT
Start: 2023-01-01 | End: 2023-01-01 | Stop reason: HOSPADM

## 2023-01-01 RX ORDER — HALOPERIDOL 2 MG/1
2 TABLET ORAL EVERY 4 HOURS PRN
Status: DISCONTINUED | OUTPATIENT
Start: 2023-01-01 | End: 2023-01-01 | Stop reason: HOSPADM

## 2023-01-01 RX ORDER — BUMETANIDE 0.25 MG/ML
1 INJECTION INTRAMUSCULAR; INTRAVENOUS ONCE
Status: COMPLETED | OUTPATIENT
Start: 2023-01-01 | End: 2023-01-01

## 2023-01-01 RX ORDER — MORPHINE SULFATE 10 MG/ML
6 INJECTION INTRAMUSCULAR; INTRAVENOUS; SUBCUTANEOUS
Status: DISCONTINUED | OUTPATIENT
Start: 2023-01-01 | End: 2023-01-01 | Stop reason: HOSPADM

## 2023-01-01 RX ORDER — BISACODYL 10 MG
10 SUPPOSITORY, RECTAL RECTAL DAILY PRN
Status: DISCONTINUED | OUTPATIENT
Start: 2023-01-01 | End: 2023-01-01

## 2023-01-01 RX ORDER — MORPHINE SULFATE 2 MG/ML
2 INJECTION, SOLUTION INTRAMUSCULAR; INTRAVENOUS
Status: DISCONTINUED | OUTPATIENT
Start: 2023-01-01 | End: 2023-01-01 | Stop reason: HOSPADM

## 2023-01-01 RX ORDER — POLYETHYLENE GLYCOL 3350 17 G/17G
17 POWDER, FOR SOLUTION ORAL DAILY PRN
Status: DISCONTINUED | OUTPATIENT
Start: 2023-01-01 | End: 2023-01-01 | Stop reason: HOSPADM

## 2023-01-01 RX ORDER — LORAZEPAM 2 MG/ML
1 CONCENTRATE ORAL
Status: DISCONTINUED | OUTPATIENT
Start: 2023-01-01 | End: 2023-01-01 | Stop reason: HOSPADM

## 2023-01-01 RX ORDER — HALOPERIDOL 2 MG/ML
2 SOLUTION ORAL EVERY 4 HOURS PRN
Status: DISCONTINUED | OUTPATIENT
Start: 2023-01-01 | End: 2023-01-01 | Stop reason: HOSPADM

## 2023-01-01 RX ORDER — HALOPERIDOL 2 MG/ML
1 SOLUTION ORAL EVERY 4 HOURS PRN
Status: DISCONTINUED | OUTPATIENT
Start: 2023-01-01 | End: 2023-01-01 | Stop reason: HOSPADM

## 2023-01-01 RX ORDER — HALOPERIDOL 1 MG/1
1 TABLET ORAL EVERY 4 HOURS PRN
Status: DISCONTINUED | OUTPATIENT
Start: 2023-01-01 | End: 2023-01-01 | Stop reason: HOSPADM

## 2023-01-01 RX ORDER — LORAZEPAM 2 MG/ML
0.5 CONCENTRATE ORAL
Status: DISCONTINUED | OUTPATIENT
Start: 2023-01-01 | End: 2023-01-01 | Stop reason: HOSPADM

## 2023-01-01 RX ORDER — ONDANSETRON 2 MG/ML
4 INJECTION INTRAMUSCULAR; INTRAVENOUS EVERY 6 HOURS PRN
Status: DISCONTINUED | OUTPATIENT
Start: 2023-01-01 | End: 2023-01-01 | Stop reason: HOSPADM

## 2023-01-01 RX ORDER — DONEPEZIL HYDROCHLORIDE 10 MG/1
10 TABLET, FILM COATED ORAL DAILY
Status: DISCONTINUED | OUTPATIENT
Start: 2023-01-01 | End: 2023-01-01

## 2023-01-01 RX ORDER — MEMANTINE HYDROCHLORIDE 10 MG/1
10 TABLET ORAL 2 TIMES DAILY
Status: DISCONTINUED | OUTPATIENT
Start: 2023-01-01 | End: 2023-01-01

## 2023-01-01 RX ORDER — PROCHLORPERAZINE 25 MG
25 SUPPOSITORY, RECTAL RECTAL EVERY 12 HOURS PRN
Status: DISCONTINUED | OUTPATIENT
Start: 2023-01-01 | End: 2023-01-01 | Stop reason: HOSPADM

## 2023-01-01 RX ORDER — MORPHINE SULFATE 20 MG/ML
20 SOLUTION ORAL
Status: DISCONTINUED | OUTPATIENT
Start: 2023-01-01 | End: 2023-01-01 | Stop reason: HOSPADM

## 2023-01-01 RX ORDER — MORPHINE SULFATE 20 MG/ML
5 SOLUTION ORAL
Status: DISCONTINUED | OUTPATIENT
Start: 2023-01-01 | End: 2023-01-01 | Stop reason: HOSPADM

## 2023-01-01 RX ORDER — BISACODYL 5 MG/1
5 TABLET, DELAYED RELEASE ORAL DAILY PRN
Status: DISCONTINUED | OUTPATIENT
Start: 2023-01-01 | End: 2023-01-01

## 2023-01-01 RX ORDER — GLYCOPYRROLATE 0.2 MG/ML
0.4 INJECTION INTRAMUSCULAR; INTRAVENOUS
Status: DISCONTINUED | OUTPATIENT
Start: 2023-01-01 | End: 2023-01-01 | Stop reason: HOSPADM

## 2023-01-01 RX ORDER — ASPIRIN 81 MG/1
81 TABLET ORAL DAILY
Status: DISCONTINUED | OUTPATIENT
Start: 2023-01-01 | End: 2023-01-01

## 2023-01-01 RX ORDER — PROCHLORPERAZINE EDISYLATE 5 MG/ML
5 INJECTION INTRAMUSCULAR; INTRAVENOUS EVERY 6 HOURS PRN
Status: DISCONTINUED | OUTPATIENT
Start: 2023-01-01 | End: 2023-01-01 | Stop reason: HOSPADM

## 2023-01-01 RX ORDER — NITROGLYCERIN 0.4 MG/1
0.4 TABLET SUBLINGUAL
Status: DISCONTINUED | OUTPATIENT
Start: 2023-01-01 | End: 2023-01-01

## 2023-01-01 RX ORDER — HYDROMORPHONE HYDROCHLORIDE 1 MG/ML
0.5 INJECTION, SOLUTION INTRAMUSCULAR; INTRAVENOUS; SUBCUTANEOUS
Status: DISCONTINUED | OUTPATIENT
Start: 2023-01-01 | End: 2023-01-01 | Stop reason: HOSPADM

## 2023-01-01 RX ORDER — ADENOSINE 3 MG/ML
INJECTION, SOLUTION INTRAVENOUS
Status: COMPLETED
Start: 2023-01-01 | End: 2023-01-01

## 2023-01-01 RX ORDER — ECHINACEA PURPUREA EXTRACT 125 MG
2 TABLET ORAL AS NEEDED
Status: DISCONTINUED | OUTPATIENT
Start: 2023-01-01 | End: 2023-01-01 | Stop reason: HOSPADM

## 2023-01-01 RX ORDER — ENOXAPARIN SODIUM 100 MG/ML
40 INJECTION SUBCUTANEOUS EVERY 24 HOURS
Status: DISCONTINUED | OUTPATIENT
Start: 2023-01-01 | End: 2023-01-01 | Stop reason: DRUGHIGH

## 2023-01-01 RX ORDER — PANTOPRAZOLE SODIUM 40 MG/10ML
40 INJECTION, POWDER, LYOPHILIZED, FOR SOLUTION INTRAVENOUS
Status: DISCONTINUED | OUTPATIENT
Start: 2023-01-01 | End: 2023-01-01

## 2023-01-01 RX ORDER — HALOPERIDOL 5 MG/ML
1 INJECTION INTRAMUSCULAR EVERY 4 HOURS PRN
Status: DISCONTINUED | OUTPATIENT
Start: 2023-01-01 | End: 2023-01-01 | Stop reason: HOSPADM

## 2023-01-01 RX ORDER — ACETAMINOPHEN 160 MG/5ML
650 SOLUTION ORAL EVERY 4 HOURS PRN
Status: DISCONTINUED | OUTPATIENT
Start: 2023-01-01 | End: 2023-01-01 | Stop reason: HOSPADM

## 2023-01-01 RX ORDER — GABAPENTIN 100 MG/1
100 CAPSULE ORAL NIGHTLY
Status: DISCONTINUED | OUTPATIENT
Start: 2023-01-01 | End: 2023-01-01

## 2023-01-01 RX ORDER — FUROSEMIDE 10 MG/ML
80 INJECTION INTRAMUSCULAR; INTRAVENOUS ONCE
Status: COMPLETED | OUTPATIENT
Start: 2023-01-01 | End: 2023-01-01

## 2023-01-01 RX ORDER — BISACODYL 5 MG/1
5 TABLET, DELAYED RELEASE ORAL DAILY PRN
Status: DISCONTINUED | OUTPATIENT
Start: 2023-01-01 | End: 2023-01-01 | Stop reason: HOSPADM

## 2023-01-01 RX ORDER — HEPARIN SODIUM 5000 [USP'U]/ML
5000 INJECTION, SOLUTION INTRAVENOUS; SUBCUTANEOUS EVERY 8 HOURS SCHEDULED
Status: DISCONTINUED | OUTPATIENT
Start: 2023-01-01 | End: 2023-01-01

## 2023-01-01 RX ORDER — ONDANSETRON 4 MG/1
4 TABLET, FILM COATED ORAL EVERY 6 HOURS PRN
Status: DISCONTINUED | OUTPATIENT
Start: 2023-01-01 | End: 2023-01-01 | Stop reason: HOSPADM

## 2023-01-01 RX ORDER — ACETAMINOPHEN 650 MG/1
650 SUPPOSITORY RECTAL EVERY 4 HOURS PRN
Status: DISCONTINUED | OUTPATIENT
Start: 2023-01-01 | End: 2023-01-01 | Stop reason: HOSPADM

## 2023-01-01 RX ORDER — POLYETHYLENE GLYCOL 3350 17 G/17G
17 POWDER, FOR SOLUTION ORAL DAILY PRN
Status: DISCONTINUED | OUTPATIENT
Start: 2023-01-01 | End: 2023-01-01

## 2023-01-01 RX ORDER — SCOLOPAMINE TRANSDERMAL SYSTEM 1 MG/1
1 PATCH, EXTENDED RELEASE TRANSDERMAL
Status: DISCONTINUED | OUTPATIENT
Start: 2023-01-01 | End: 2023-01-01 | Stop reason: HOSPADM

## 2023-01-01 RX ORDER — PANTOPRAZOLE SODIUM 40 MG/1
40 TABLET, DELAYED RELEASE ORAL DAILY
Status: DISCONTINUED | OUTPATIENT
Start: 2023-01-01 | End: 2023-01-01

## 2023-01-01 RX ORDER — MORPHINE SULFATE 2 MG/ML
4 INJECTION, SOLUTION INTRAMUSCULAR; INTRAVENOUS
Status: DISCONTINUED | OUTPATIENT
Start: 2023-01-01 | End: 2023-01-01 | Stop reason: HOSPADM

## 2023-01-01 RX ORDER — LIDOCAINE HYDROCHLORIDE 20 MG/ML
5 SOLUTION OROPHARYNGEAL EVERY 4 HOURS PRN
Status: DISCONTINUED | OUTPATIENT
Start: 2023-01-01 | End: 2023-01-01 | Stop reason: HOSPADM

## 2023-01-01 RX ORDER — MORPHINE SULFATE 20 MG/ML
10 SOLUTION ORAL
Status: DISCONTINUED | OUTPATIENT
Start: 2023-01-01 | End: 2023-01-01 | Stop reason: HOSPADM

## 2023-01-01 RX ORDER — GABAPENTIN 100 MG/1
100 CAPSULE ORAL 3 TIMES DAILY
Status: DISCONTINUED | OUTPATIENT
Start: 2023-01-01 | End: 2023-01-01

## 2023-01-01 RX ORDER — AMOXICILLIN 250 MG
2 CAPSULE ORAL 2 TIMES DAILY
Status: DISCONTINUED | OUTPATIENT
Start: 2023-01-01 | End: 2023-01-01 | Stop reason: HOSPADM

## 2023-01-01 RX ORDER — HALOPERIDOL 5 MG/ML
2 INJECTION INTRAMUSCULAR EVERY 4 HOURS PRN
Status: DISCONTINUED | OUTPATIENT
Start: 2023-01-01 | End: 2023-01-01 | Stop reason: HOSPADM

## 2023-01-01 RX ORDER — BISACODYL 10 MG
10 SUPPOSITORY, RECTAL RECTAL DAILY PRN
Status: DISCONTINUED | OUTPATIENT
Start: 2023-01-01 | End: 2023-01-01 | Stop reason: HOSPADM

## 2023-01-01 RX ORDER — ACETAMINOPHEN 325 MG/1
650 TABLET ORAL EVERY 4 HOURS PRN
Status: DISCONTINUED | OUTPATIENT
Start: 2023-01-01 | End: 2023-01-01 | Stop reason: HOSPADM

## 2023-01-01 RX ORDER — HYDROMORPHONE HYDROCHLORIDE 2 MG/ML
1.5 INJECTION, SOLUTION INTRAMUSCULAR; INTRAVENOUS; SUBCUTANEOUS
Status: DISCONTINUED | OUTPATIENT
Start: 2023-01-01 | End: 2023-01-01 | Stop reason: HOSPADM

## 2023-01-01 RX ADMIN — ASPIRIN 81 MG: 81 TABLET, COATED ORAL at 08:51

## 2023-01-01 RX ADMIN — Medication 10 ML: at 21:19

## 2023-01-01 RX ADMIN — IPRATROPIUM BROMIDE AND ALBUTEROL SULFATE 3 ML: 2.5; .5 SOLUTION RESPIRATORY (INHALATION) at 15:58

## 2023-01-01 RX ADMIN — ASPIRIN 81 MG: 81 TABLET, COATED ORAL at 10:43

## 2023-01-01 RX ADMIN — HEPARIN SODIUM 5000 UNITS: 5000 INJECTION, SOLUTION INTRAVENOUS; SUBCUTANEOUS at 15:41

## 2023-01-01 RX ADMIN — PIPERACILLIN SODIUM AND TAZOBACTAM SODIUM 3.38 G: 3; .375 INJECTION, SOLUTION INTRAVENOUS at 13:03

## 2023-01-01 RX ADMIN — IPRATROPIUM BROMIDE AND ALBUTEROL SULFATE 3 ML: 2.5; .5 SOLUTION RESPIRATORY (INHALATION) at 10:22

## 2023-01-01 RX ADMIN — PIPERACILLIN SODIUM AND TAZOBACTAM SODIUM 3.38 G: 3; .375 INJECTION, SOLUTION INTRAVENOUS at 06:59

## 2023-01-01 RX ADMIN — IPRATROPIUM BROMIDE AND ALBUTEROL SULFATE 3 ML: 2.5; .5 SOLUTION RESPIRATORY (INHALATION) at 19:21

## 2023-01-01 RX ADMIN — DEXTROSE MONOHYDRATE 25 G: 25 INJECTION, SOLUTION INTRAVENOUS at 11:57

## 2023-01-01 RX ADMIN — SODIUM CHLORIDE 500 ML: 9 INJECTION, SOLUTION INTRAVENOUS at 21:19

## 2023-01-01 RX ADMIN — THIAMINE HYDROCHLORIDE 200 MG: 100 INJECTION, SOLUTION INTRAMUSCULAR; INTRAVENOUS at 08:51

## 2023-01-01 RX ADMIN — SODIUM BICARBONATE: 84 INJECTION, SOLUTION INTRAVENOUS at 17:54

## 2023-01-01 RX ADMIN — PIPERACILLIN SODIUM AND TAZOBACTAM SODIUM 3.38 G: 3; .375 INJECTION, SOLUTION INTRAVENOUS at 00:08

## 2023-01-01 RX ADMIN — PANTOPRAZOLE SODIUM 40 MG: 40 INJECTION, POWDER, FOR SOLUTION INTRAVENOUS at 10:43

## 2023-01-01 RX ADMIN — SODIUM ZIRCONIUM CYCLOSILICATE 10 G: 10 POWDER, FOR SUSPENSION ORAL at 10:43

## 2023-01-01 RX ADMIN — LORAZEPAM 1 MG: 2 INJECTION INTRAMUSCULAR; INTRAVENOUS at 21:05

## 2023-01-01 RX ADMIN — HEPARIN SODIUM 5000 UNITS: 5000 INJECTION, SOLUTION INTRAVENOUS; SUBCUTANEOUS at 21:00

## 2023-01-01 RX ADMIN — FUROSEMIDE 80 MG: 10 INJECTION, SOLUTION INTRAMUSCULAR; INTRAVENOUS at 23:11

## 2023-01-01 RX ADMIN — CEFTRIAXONE SODIUM 1000 MG: 1 INJECTION, POWDER, FOR SOLUTION INTRAMUSCULAR; INTRAVENOUS at 23:15

## 2023-01-01 RX ADMIN — HEPARIN SODIUM 5000 UNITS: 5000 INJECTION, SOLUTION INTRAVENOUS; SUBCUTANEOUS at 21:18

## 2023-01-01 RX ADMIN — PRIMIDONE 50 MG: 50 TABLET ORAL at 21:00

## 2023-01-01 RX ADMIN — IPRATROPIUM BROMIDE AND ALBUTEROL SULFATE 3 ML: 2.5; .5 SOLUTION RESPIRATORY (INHALATION) at 03:01

## 2023-01-01 RX ADMIN — Medication 10 ML: at 08:23

## 2023-01-01 RX ADMIN — PIPERACILLIN SODIUM AND TAZOBACTAM SODIUM 3.38 G: 3; .375 INJECTION, SOLUTION INTRAVENOUS at 14:39

## 2023-01-01 RX ADMIN — GABAPENTIN 100 MG: 100 CAPSULE ORAL at 22:07

## 2023-01-01 RX ADMIN — Medication 10 ML: at 08:51

## 2023-01-01 RX ADMIN — MEMANTINE HYDROCHLORIDE 10 MG: 10 TABLET, FILM COATED ORAL at 22:07

## 2023-01-01 RX ADMIN — Medication 10 ML: at 22:07

## 2023-01-01 RX ADMIN — PANTOPRAZOLE SODIUM 40 MG: 40 INJECTION, POWDER, FOR SOLUTION INTRAVENOUS at 05:01

## 2023-01-01 RX ADMIN — IPRATROPIUM BROMIDE AND ALBUTEROL SULFATE 3 ML: 2.5; .5 SOLUTION RESPIRATORY (INHALATION) at 07:30

## 2023-01-01 RX ADMIN — ADENOSINE 6 MG: 3 INJECTION, SOLUTION INTRAVENOUS at 18:35

## 2023-01-01 RX ADMIN — HYDROMORPHONE HYDROCHLORIDE 1 MG: 1 INJECTION, SOLUTION INTRAMUSCULAR; INTRAVENOUS; SUBCUTANEOUS at 15:48

## 2023-01-01 RX ADMIN — FOLIC ACID 1 MG: 5 INJECTION, SOLUTION INTRAMUSCULAR; INTRAVENOUS; SUBCUTANEOUS at 08:51

## 2023-01-01 RX ADMIN — LORAZEPAM 2 MG: 2 INJECTION INTRAMUSCULAR; INTRAVENOUS at 05:01

## 2023-01-01 RX ADMIN — LORAZEPAM 1 MG: 2 INJECTION INTRAMUSCULAR; INTRAVENOUS at 15:49

## 2023-01-01 RX ADMIN — SODIUM BICARBONATE 50 MEQ: 84 INJECTION INTRAVENOUS at 15:41

## 2023-01-01 RX ADMIN — IPRATROPIUM BROMIDE AND ALBUTEROL SULFATE 3 ML: 2.5; .5 SOLUTION RESPIRATORY (INHALATION) at 12:19

## 2023-01-01 RX ADMIN — INSULIN HUMAN 5 UNITS: 100 INJECTION, SOLUTION PARENTERAL at 11:57

## 2023-01-01 RX ADMIN — BUMETANIDE 1 MG: 0.25 INJECTION INTRAMUSCULAR; INTRAVENOUS at 12:48

## 2023-01-01 RX ADMIN — AZITHROMYCIN MONOHYDRATE 500 MG: 500 INJECTION, POWDER, LYOPHILIZED, FOR SOLUTION INTRAVENOUS at 23:48

## 2023-01-01 RX ADMIN — HYDROMORPHONE HYDROCHLORIDE 1 MG: 1 INJECTION, SOLUTION INTRAMUSCULAR; INTRAVENOUS; SUBCUTANEOUS at 05:01

## 2023-01-01 RX ADMIN — PIPERACILLIN SODIUM AND TAZOBACTAM SODIUM 3.38 G: 3; .375 INJECTION, SOLUTION INTRAVENOUS at 22:07

## 2023-01-01 RX ADMIN — HYDROMORPHONE HYDROCHLORIDE 1 MG: 1 INJECTION, SOLUTION INTRAMUSCULAR; INTRAVENOUS; SUBCUTANEOUS at 12:04

## 2023-01-01 RX ADMIN — PIPERACILLIN SODIUM AND TAZOBACTAM SODIUM 3.38 G: 3; .375 INJECTION, SOLUTION INTRAVENOUS at 12:49

## 2023-01-01 RX ADMIN — IPRATROPIUM BROMIDE AND ALBUTEROL SULFATE 3 ML: 2.5; .5 SOLUTION RESPIRATORY (INHALATION) at 19:07

## 2023-01-01 RX ADMIN — SODIUM BICARBONATE: 84 INJECTION, SOLUTION INTRAVENOUS at 05:00

## 2023-01-01 RX ADMIN — SODIUM CHLORIDE 50 ML/HR: 9 INJECTION, SOLUTION INTRAVENOUS at 09:56

## 2023-01-01 RX ADMIN — PRIMIDONE 50 MG: 50 TABLET ORAL at 22:07

## 2023-01-01 RX ADMIN — HEPARIN SODIUM 5000 UNITS: 5000 INJECTION, SOLUTION INTRAVENOUS; SUBCUTANEOUS at 05:01

## 2023-01-01 RX ADMIN — IPRATROPIUM BROMIDE AND ALBUTEROL SULFATE 3 ML: 2.5; .5 SOLUTION RESPIRATORY (INHALATION) at 23:21

## 2023-01-01 RX ADMIN — IPRATROPIUM BROMIDE AND ALBUTEROL SULFATE 3 ML: 2.5; .5 SOLUTION RESPIRATORY (INHALATION) at 07:46

## 2023-01-01 RX ADMIN — IPRATROPIUM BROMIDE AND ALBUTEROL SULFATE 3 ML: .5; 2.5 SOLUTION RESPIRATORY (INHALATION) at 06:46

## 2023-01-01 RX ADMIN — SODIUM ZIRCONIUM CYCLOSILICATE 10 G: 10 POWDER, FOR SUSPENSION ORAL at 15:41

## 2023-01-01 RX ADMIN — ENOXAPARIN SODIUM 30 MG: 100 INJECTION SUBCUTANEOUS at 14:39

## 2023-01-01 RX ADMIN — PIPERACILLIN SODIUM AND TAZOBACTAM SODIUM 3.38 G: 3; .375 INJECTION, SOLUTION INTRAVENOUS at 05:05

## 2023-01-01 RX ADMIN — Medication 10 ML: at 10:03

## 2023-01-01 RX ADMIN — SODIUM BICARBONATE: 84 INJECTION, SOLUTION INTRAVENOUS at 04:36

## 2023-01-01 RX ADMIN — HEPARIN SODIUM 5000 UNITS: 5000 INJECTION, SOLUTION INTRAVENOUS; SUBCUTANEOUS at 05:02

## 2023-01-01 RX ADMIN — IPRATROPIUM BROMIDE AND ALBUTEROL SULFATE 3 ML: 2.5; .5 SOLUTION RESPIRATORY (INHALATION) at 00:07

## 2023-01-01 RX ADMIN — Medication 10 ML: at 08:52

## 2023-01-01 RX ADMIN — SODIUM BICARBONATE: 84 INJECTION, SOLUTION INTRAVENOUS at 18:20

## 2023-01-01 RX ADMIN — LORAZEPAM 2 MG: 2 INJECTION INTRAMUSCULAR; INTRAVENOUS at 00:52

## 2023-01-01 RX ADMIN — Medication 10 ML: at 21:00

## 2023-01-01 RX ADMIN — IPRATROPIUM BROMIDE AND ALBUTEROL SULFATE 3 ML: 2.5; .5 SOLUTION RESPIRATORY (INHALATION) at 20:39

## 2023-01-01 RX ADMIN — SODIUM ZIRCONIUM CYCLOSILICATE 10 G: 10 POWDER, FOR SUSPENSION ORAL at 12:48

## 2023-01-01 RX ADMIN — PERFLUTREN 3 ML: 6.52 INJECTION, SUSPENSION INTRAVENOUS at 10:56

## 2023-01-01 RX ADMIN — PIPERACILLIN SODIUM AND TAZOBACTAM SODIUM 3.38 G: 3; .375 INJECTION, SOLUTION INTRAVENOUS at 00:02

## 2023-01-01 RX ADMIN — IPRATROPIUM BROMIDE AND ALBUTEROL SULFATE 3 ML: 2.5; .5 SOLUTION RESPIRATORY (INHALATION) at 16:06

## 2023-01-01 RX ADMIN — HEPARIN SODIUM 5000 UNITS: 5000 INJECTION, SOLUTION INTRAVENOUS; SUBCUTANEOUS at 13:01

## 2023-01-01 RX ADMIN — HYDROMORPHONE HYDROCHLORIDE 1 MG: 1 INJECTION, SOLUTION INTRAMUSCULAR; INTRAVENOUS; SUBCUTANEOUS at 21:05

## 2023-01-01 RX ADMIN — HYDROMORPHONE HYDROCHLORIDE 1 MG: 1 INJECTION, SOLUTION INTRAMUSCULAR; INTRAVENOUS; SUBCUTANEOUS at 00:52

## 2023-07-09 ENCOUNTER — HOSPITAL ENCOUNTER (INPATIENT)
Facility: HOSPITAL | Age: 88
LOS: 6 days | Discharge: HOME HEALTH | DRG: 177 | End: 2023-07-15
Attending: EMERGENCY MEDICINE | Admitting: HOSPITALIST
Payer: MEDICARE

## 2023-07-09 ENCOUNTER — APPOINTMENT (OUTPATIENT)
Dept: GENERAL RADIOLOGY | Facility: HOSPITAL | Age: 88
DRG: 177 | End: 2023-07-09
Payer: MEDICARE

## 2023-07-09 DIAGNOSIS — R13.19 ESOPHAGEAL DYSPHAGIA: Primary | ICD-10-CM

## 2023-07-09 DIAGNOSIS — J96.01 ACUTE RESPIRATORY FAILURE WITH HYPOXIA: ICD-10-CM

## 2023-07-09 DIAGNOSIS — Z66 DO NOT RESUSCITATE: ICD-10-CM

## 2023-07-09 DIAGNOSIS — J69.0 ASPIRATION PNEUMONITIS: ICD-10-CM

## 2023-07-09 DIAGNOSIS — J44.1 COPD EXACERBATION: ICD-10-CM

## 2023-07-09 LAB
ALBUMIN SERPL-MCNC: 4 G/DL (ref 3.5–5.2)
ALBUMIN/GLOB SERPL: 1.1 G/DL
ALP SERPL-CCNC: 83 U/L (ref 39–117)
ALT SERPL W P-5'-P-CCNC: 16 U/L (ref 1–41)
ANION GAP SERPL CALCULATED.3IONS-SCNC: 13.7 MMOL/L (ref 5–15)
ARTERIAL PATENCY WRIST A: POSITIVE
AST SERPL-CCNC: 17 U/L (ref 1–40)
ATMOSPHERIC PRESS: 747.8 MMHG
BASE EXCESS BLDA CALC-SCNC: -2.8 MMOL/L (ref 0–2)
BASOPHILS # BLD AUTO: 0.03 10*3/MM3 (ref 0–0.2)
BASOPHILS NFR BLD AUTO: 0.3 % (ref 0–1.5)
BDY SITE: ABNORMAL
BILIRUB SERPL-MCNC: 0.2 MG/DL (ref 0–1.2)
BUN SERPL-MCNC: 20 MG/DL (ref 8–23)
BUN/CREAT SERPL: 19.2 (ref 7–25)
CALCIUM SPEC-SCNC: 9.3 MG/DL (ref 8.6–10.5)
CHLORIDE SERPL-SCNC: 103 MMOL/L (ref 98–107)
CO2 SERPL-SCNC: 22.3 MMOL/L (ref 22–29)
CREAT SERPL-MCNC: 1.04 MG/DL (ref 0.76–1.27)
D-LACTATE SERPL-SCNC: 1.6 MMOL/L (ref 0.5–2)
DEPRECATED RDW RBC AUTO: 42 FL (ref 37–54)
EGFRCR SERPLBLD CKD-EPI 2021: 69.9 ML/MIN/1.73
EOSINOPHIL # BLD AUTO: 0.11 10*3/MM3 (ref 0–0.4)
EOSINOPHIL NFR BLD AUTO: 1 % (ref 0.3–6.2)
ERYTHROCYTE [DISTWIDTH] IN BLOOD BY AUTOMATED COUNT: 12.4 % (ref 12.3–15.4)
GAS FLOW AIRWAY: 15 LPM
GLOBULIN UR ELPH-MCNC: 3.8 GM/DL
GLUCOSE SERPL-MCNC: 134 MG/DL (ref 65–99)
HCO3 BLDA-SCNC: 25 MMOL/L (ref 22–28)
HCT VFR BLD AUTO: 45.5 % (ref 37.5–51)
HGB BLD-MCNC: 14.4 G/DL (ref 13–17.7)
IMM GRANULOCYTES # BLD AUTO: 0.05 10*3/MM3 (ref 0–0.05)
IMM GRANULOCYTES NFR BLD AUTO: 0.5 % (ref 0–0.5)
INR PPP: 1.04 (ref 0.9–1.1)
LYMPHOCYTES # BLD AUTO: 1.81 10*3/MM3 (ref 0.7–3.1)
LYMPHOCYTES NFR BLD AUTO: 17.2 % (ref 19.6–45.3)
MCH RBC QN AUTO: 29.2 PG (ref 26.6–33)
MCHC RBC AUTO-ENTMCNC: 31.6 G/DL (ref 31.5–35.7)
MCV RBC AUTO: 92.3 FL (ref 79–97)
MODALITY: ABNORMAL
MONOCYTES # BLD AUTO: 0.55 10*3/MM3 (ref 0.1–0.9)
MONOCYTES NFR BLD AUTO: 5.2 % (ref 5–12)
NEUTROPHILS NFR BLD AUTO: 7.96 10*3/MM3 (ref 1.7–7)
NEUTROPHILS NFR BLD AUTO: 75.8 % (ref 42.7–76)
NRBC BLD AUTO-RTO: 0 /100 WBC (ref 0–0.2)
PCO2 BLDA: 53.8 MM HG (ref 35–45)
PH BLDA: 7.28 PH UNITS (ref 7.35–7.45)
PLATELET # BLD AUTO: 295 10*3/MM3 (ref 140–450)
PMV BLD AUTO: 10.6 FL (ref 6–12)
PO2 BLDA: 81 MM HG (ref 80–100)
POTASSIUM SERPL-SCNC: 4.4 MMOL/L (ref 3.5–5.2)
PROCALCITONIN SERPL-MCNC: 0.03 NG/ML (ref 0–0.25)
PROT SERPL-MCNC: 7.8 G/DL (ref 6–8.5)
PROTHROMBIN TIME: 13.8 SECONDS (ref 11.7–14.2)
QT INTERVAL: 421 MS
RBC # BLD AUTO: 4.93 10*6/MM3 (ref 4.14–5.8)
SAO2 % BLDCOA: 93.9 % (ref 92–99)
SODIUM SERPL-SCNC: 139 MMOL/L (ref 136–145)
TOTAL RATE: 22 BREATHS/MINUTE
TROPONIN T SERPL HS-MCNC: 29 NG/L
WBC NRBC COR # BLD: 10.51 10*3/MM3 (ref 3.4–10.8)

## 2023-07-09 PROCEDURE — 94799 UNLISTED PULMONARY SVC/PX: CPT

## 2023-07-09 PROCEDURE — 25010000002 PIPERACILLIN SOD-TAZOBACTAM PER 1 G: Performed by: EMERGENCY MEDICINE

## 2023-07-09 PROCEDURE — 25010000002 METHYLPREDNISOLONE PER 125 MG: Performed by: EMERGENCY MEDICINE

## 2023-07-09 PROCEDURE — 71045 X-RAY EXAM CHEST 1 VIEW: CPT

## 2023-07-09 PROCEDURE — 36415 COLL VENOUS BLD VENIPUNCTURE: CPT

## 2023-07-09 PROCEDURE — 93010 ELECTROCARDIOGRAM REPORT: CPT | Performed by: INTERNAL MEDICINE

## 2023-07-09 PROCEDURE — 87040 BLOOD CULTURE FOR BACTERIA: CPT | Performed by: EMERGENCY MEDICINE

## 2023-07-09 PROCEDURE — 80053 COMPREHEN METABOLIC PANEL: CPT | Performed by: EMERGENCY MEDICINE

## 2023-07-09 PROCEDURE — 85610 PROTHROMBIN TIME: CPT | Performed by: EMERGENCY MEDICINE

## 2023-07-09 PROCEDURE — 84145 PROCALCITONIN (PCT): CPT | Performed by: EMERGENCY MEDICINE

## 2023-07-09 PROCEDURE — 93005 ELECTROCARDIOGRAM TRACING: CPT | Performed by: EMERGENCY MEDICINE

## 2023-07-09 PROCEDURE — 99285 EMERGENCY DEPT VISIT HI MDM: CPT

## 2023-07-09 PROCEDURE — 36600 WITHDRAWAL OF ARTERIAL BLOOD: CPT

## 2023-07-09 PROCEDURE — 82803 BLOOD GASES ANY COMBINATION: CPT

## 2023-07-09 PROCEDURE — 94761 N-INVAS EAR/PLS OXIMETRY MLT: CPT

## 2023-07-09 PROCEDURE — 84484 ASSAY OF TROPONIN QUANT: CPT | Performed by: EMERGENCY MEDICINE

## 2023-07-09 PROCEDURE — 83605 ASSAY OF LACTIC ACID: CPT | Performed by: EMERGENCY MEDICINE

## 2023-07-09 PROCEDURE — 85025 COMPLETE CBC W/AUTO DIFF WBC: CPT | Performed by: EMERGENCY MEDICINE

## 2023-07-09 PROCEDURE — 94760 N-INVAS EAR/PLS OXIMETRY 1: CPT

## 2023-07-09 RX ORDER — IPRATROPIUM BROMIDE AND ALBUTEROL SULFATE 2.5; .5 MG/3ML; MG/3ML
3 SOLUTION RESPIRATORY (INHALATION) ONCE
Status: COMPLETED | OUTPATIENT
Start: 2023-07-09 | End: 2023-07-09

## 2023-07-09 RX ORDER — LATANOPROST 50 UG/ML
1 SOLUTION/ DROPS OPHTHALMIC NIGHTLY
COMMUNITY
Start: 2023-06-22

## 2023-07-09 RX ORDER — ASPIRIN 81 MG/1
81 TABLET ORAL DAILY
COMMUNITY

## 2023-07-09 RX ORDER — SODIUM CHLORIDE 0.9 % (FLUSH) 0.9 %
10 SYRINGE (ML) INJECTION AS NEEDED
Status: DISCONTINUED | OUTPATIENT
Start: 2023-07-09 | End: 2023-07-15 | Stop reason: HOSPADM

## 2023-07-09 RX ORDER — METOPROLOL SUCCINATE 50 MG/1
1 TABLET, EXTENDED RELEASE ORAL DAILY
COMMUNITY
Start: 2023-03-20

## 2023-07-09 RX ORDER — DONEPEZIL HYDROCHLORIDE 10 MG/1
10 TABLET, FILM COATED ORAL DAILY
COMMUNITY
Start: 2023-02-05

## 2023-07-09 RX ORDER — PANTOPRAZOLE SODIUM 40 MG/1
40 TABLET, DELAYED RELEASE ORAL DAILY
COMMUNITY
Start: 2023-02-28

## 2023-07-09 RX ORDER — FUROSEMIDE 20 MG/1
20 TABLET ORAL DAILY
COMMUNITY
Start: 2023-04-25

## 2023-07-09 RX ORDER — METHYLPREDNISOLONE SODIUM SUCCINATE 125 MG/2ML
125 INJECTION, POWDER, LYOPHILIZED, FOR SOLUTION INTRAMUSCULAR; INTRAVENOUS ONCE
Status: COMPLETED | OUTPATIENT
Start: 2023-07-09 | End: 2023-07-09

## 2023-07-09 RX ORDER — MEMANTINE HYDROCHLORIDE 10 MG/1
10 TABLET ORAL 2 TIMES DAILY
COMMUNITY
Start: 2023-02-05

## 2023-07-09 RX ADMIN — PIPERACILLIN SODIUM AND TAZOBACTAM SODIUM 3.38 G: 3; .375 INJECTION, SOLUTION INTRAVENOUS at 23:12

## 2023-07-09 RX ADMIN — IPRATROPIUM BROMIDE AND ALBUTEROL SULFATE 3 ML: .5; 3 SOLUTION RESPIRATORY (INHALATION) at 21:36

## 2023-07-09 RX ADMIN — METHYLPREDNISOLONE SODIUM SUCCINATE 125 MG: 125 INJECTION, POWDER, FOR SOLUTION INTRAMUSCULAR; INTRAVENOUS at 21:45

## 2023-07-10 LAB
GEN 5 2HR TROPONIN T REFLEX: 30 NG/L
NT-PROBNP SERPL-MCNC: 2433 PG/ML (ref 0–1800)
TROPONIN T DELTA: 1 NG/L

## 2023-07-10 PROCEDURE — 94799 UNLISTED PULMONARY SVC/PX: CPT

## 2023-07-10 PROCEDURE — 25010000002 PIPERACILLIN SOD-TAZOBACTAM PER 1 G: Performed by: HOSPITALIST

## 2023-07-10 PROCEDURE — 83880 ASSAY OF NATRIURETIC PEPTIDE: CPT | Performed by: HOSPITALIST

## 2023-07-10 PROCEDURE — 25010000002 METHYLPREDNISOLONE PER 40 MG: Performed by: HOSPITALIST

## 2023-07-10 PROCEDURE — 94760 N-INVAS EAR/PLS OXIMETRY 1: CPT

## 2023-07-10 PROCEDURE — 94664 DEMO&/EVAL PT USE INHALER: CPT

## 2023-07-10 PROCEDURE — 36415 COLL VENOUS BLD VENIPUNCTURE: CPT | Performed by: HOSPITALIST

## 2023-07-10 PROCEDURE — 92610 EVALUATE SWALLOWING FUNCTION: CPT

## 2023-07-10 PROCEDURE — 94761 N-INVAS EAR/PLS OXIMETRY MLT: CPT

## 2023-07-10 RX ORDER — BUDESONIDE AND FORMOTEROL FUMARATE DIHYDRATE 160; 4.5 UG/1; UG/1
2 AEROSOL RESPIRATORY (INHALATION)
Status: DISCONTINUED | OUTPATIENT
Start: 2023-07-10 | End: 2023-07-15 | Stop reason: HOSPADM

## 2023-07-10 RX ORDER — FUROSEMIDE 20 MG/1
20 TABLET ORAL DAILY
Status: DISCONTINUED | OUTPATIENT
Start: 2023-07-10 | End: 2023-07-15 | Stop reason: HOSPADM

## 2023-07-10 RX ORDER — LOSARTAN POTASSIUM 50 MG/1
100 TABLET ORAL DAILY
COMMUNITY
End: 2023-07-15 | Stop reason: HOSPADM

## 2023-07-10 RX ORDER — GABAPENTIN 100 MG/1
100 CAPSULE ORAL 3 TIMES DAILY
Status: DISCONTINUED | OUTPATIENT
Start: 2023-07-10 | End: 2023-07-15 | Stop reason: HOSPADM

## 2023-07-10 RX ORDER — PRIMIDONE 50 MG/1
50 TABLET ORAL NIGHTLY
COMMUNITY

## 2023-07-10 RX ORDER — IPRATROPIUM BROMIDE AND ALBUTEROL SULFATE 2.5; .5 MG/3ML; MG/3ML
3 SOLUTION RESPIRATORY (INHALATION)
Status: DISCONTINUED | OUTPATIENT
Start: 2023-07-10 | End: 2023-07-15 | Stop reason: HOSPADM

## 2023-07-10 RX ORDER — IPRATROPIUM BROMIDE AND ALBUTEROL SULFATE 2.5; .5 MG/3ML; MG/3ML
3 SOLUTION RESPIRATORY (INHALATION) ONCE
Status: COMPLETED | OUTPATIENT
Start: 2023-07-10 | End: 2023-07-10

## 2023-07-10 RX ORDER — SODIUM CHLORIDE FOR INHALATION 7 %
4 VIAL, NEBULIZER (ML) INHALATION ONCE
Status: DISCONTINUED | OUTPATIENT
Start: 2023-07-10 | End: 2023-07-10

## 2023-07-10 RX ORDER — LATANOPROST 50 UG/ML
1 SOLUTION/ DROPS OPHTHALMIC NIGHTLY
Status: DISCONTINUED | OUTPATIENT
Start: 2023-07-10 | End: 2023-07-15 | Stop reason: HOSPADM

## 2023-07-10 RX ORDER — GUAIFENESIN 600 MG/1
600 TABLET, EXTENDED RELEASE ORAL EVERY 12 HOURS SCHEDULED
Status: DISCONTINUED | OUTPATIENT
Start: 2023-07-10 | End: 2023-07-15 | Stop reason: HOSPADM

## 2023-07-10 RX ORDER — PANTOPRAZOLE SODIUM 40 MG/1
40 TABLET, DELAYED RELEASE ORAL DAILY
Status: DISCONTINUED | OUTPATIENT
Start: 2023-07-10 | End: 2023-07-10

## 2023-07-10 RX ORDER — METOPROLOL SUCCINATE 50 MG/1
50 TABLET, EXTENDED RELEASE ORAL DAILY
Status: DISCONTINUED | OUTPATIENT
Start: 2023-07-10 | End: 2023-07-15 | Stop reason: HOSPADM

## 2023-07-10 RX ORDER — MEMANTINE HYDROCHLORIDE 10 MG/1
10 TABLET ORAL 2 TIMES DAILY
Status: DISCONTINUED | OUTPATIENT
Start: 2023-07-10 | End: 2023-07-15 | Stop reason: HOSPADM

## 2023-07-10 RX ORDER — PANTOPRAZOLE SODIUM 40 MG/1
40 TABLET, DELAYED RELEASE ORAL
Status: DISCONTINUED | OUTPATIENT
Start: 2023-07-10 | End: 2023-07-15 | Stop reason: HOSPADM

## 2023-07-10 RX ORDER — METHYLPREDNISOLONE SODIUM SUCCINATE 40 MG/ML
40 INJECTION, POWDER, LYOPHILIZED, FOR SOLUTION INTRAMUSCULAR; INTRAVENOUS EVERY 12 HOURS
Status: DISCONTINUED | OUTPATIENT
Start: 2023-07-10 | End: 2023-07-11

## 2023-07-10 RX ORDER — SODIUM CHLORIDE FOR INHALATION 7 %
4 VIAL, NEBULIZER (ML) INHALATION
Status: DISCONTINUED | OUTPATIENT
Start: 2023-07-10 | End: 2023-07-15 | Stop reason: HOSPADM

## 2023-07-10 RX ORDER — LISINOPRIL 20 MG/1
20 TABLET ORAL DAILY
Status: DISCONTINUED | OUTPATIENT
Start: 2023-07-10 | End: 2023-07-10

## 2023-07-10 RX ORDER — ASPIRIN 81 MG/1
81 TABLET ORAL DAILY
Status: DISCONTINUED | OUTPATIENT
Start: 2023-07-10 | End: 2023-07-15 | Stop reason: HOSPADM

## 2023-07-10 RX ORDER — IPRATROPIUM BROMIDE AND ALBUTEROL SULFATE 2.5; .5 MG/3ML; MG/3ML
3 SOLUTION RESPIRATORY (INHALATION) ONCE
Status: DISCONTINUED | OUTPATIENT
Start: 2023-07-10 | End: 2023-07-10

## 2023-07-10 RX ORDER — DONEPEZIL HYDROCHLORIDE 10 MG/1
10 TABLET, FILM COATED ORAL DAILY
Status: DISCONTINUED | OUTPATIENT
Start: 2023-07-10 | End: 2023-07-15 | Stop reason: HOSPADM

## 2023-07-10 RX ADMIN — PIPERACILLIN SODIUM AND TAZOBACTAM SODIUM 3.38 G: 3; .375 INJECTION, SOLUTION INTRAVENOUS at 13:33

## 2023-07-10 RX ADMIN — IPRATROPIUM BROMIDE AND ALBUTEROL SULFATE 3 ML: .5; 3 SOLUTION RESPIRATORY (INHALATION) at 07:28

## 2023-07-10 RX ADMIN — IPRATROPIUM BROMIDE AND ALBUTEROL SULFATE 3 ML: .5; 3 SOLUTION RESPIRATORY (INHALATION) at 20:49

## 2023-07-10 RX ADMIN — LATANOPROST 1 DROP: 50 SOLUTION OPHTHALMIC at 23:00

## 2023-07-10 RX ADMIN — Medication 4 ML: at 20:49

## 2023-07-10 RX ADMIN — IPRATROPIUM BROMIDE AND ALBUTEROL SULFATE 3 ML: .5; 3 SOLUTION RESPIRATORY (INHALATION) at 11:10

## 2023-07-10 RX ADMIN — METHYLPREDNISOLONE SODIUM SUCCINATE 40 MG: 40 INJECTION, POWDER, FOR SOLUTION INTRAMUSCULAR; INTRAVENOUS at 13:24

## 2023-07-10 RX ADMIN — IPRATROPIUM BROMIDE AND ALBUTEROL SULFATE 3 ML: .5; 3 SOLUTION RESPIRATORY (INHALATION) at 16:06

## 2023-07-10 RX ADMIN — IPRATROPIUM BROMIDE AND ALBUTEROL SULFATE 3 ML: .5; 3 SOLUTION RESPIRATORY (INHALATION) at 03:54

## 2023-07-10 RX ADMIN — PIPERACILLIN SODIUM AND TAZOBACTAM SODIUM 3.38 G: 3; .375 INJECTION, SOLUTION INTRAVENOUS at 22:49

## 2023-07-10 NOTE — ED TRIAGE NOTES
Pt recently released from admission at a hospital in [place], sent home to here. Ems called out for aspiration. On ems arrival pt was on 3l NC satting 89%. Ems put patient on 10 L NRB bringing sats up to 92. Ems states they suctioned a lot of food out of his throat.

## 2023-07-10 NOTE — ED NOTES
Pt has audible rhonci with gargling in throat upon arrival. Pt on 15L NRB. Pt had frequent cough with moderate brown to white sputum. Suction set up at bedside. Pt denies CP at this time

## 2023-07-10 NOTE — ED NOTES
Nursing report ED to floor  Brandon Yo  87 y.o.  male    HPI :   Chief Complaint   Patient presents with    Aspiration       Admitting doctor:   Alirio Mendez MD    Admitting diagnosis:   The primary encounter diagnosis was Acute respiratory failure with hypoxia. Diagnoses of Aspiration pneumonitis, COPD exacerbation, and Do not resuscitate were also pertinent to this visit.    Code status:   Current Code Status       Date Active Code Status Order ID Comments User Context       Not on file            Allergies:   Patient has no known allergies.    Isolation:   No active isolations    Intake and Output  No intake or output data in the 24 hours ending 07/10/23 0103    Weight:       07/09/23 2135   Weight: 70.4 kg (155 lb 3.3 oz)       Most recent vitals:   Vitals:    07/09/23 2354 07/09/23 2356 07/10/23 0045 07/10/23 0056   BP:  169/90  (!) 170/103   BP Location:  Left arm     Patient Position:  Sitting     Pulse: 80 81 86 89   Resp:  17 20 26   Temp:       TempSrc:       SpO2: 94% 92% (!) 86% 94%   Weight:       Height:           Active LDAs/IV Access:   Lines, Drains & Airways       Active LDAs       Name Placement date Placement time Site Days    Peripheral IV 07/09/23 2140 Anterior;Right Forearm 07/09/23 2140  Forearm  less than 1                    Labs (abnormal labs have a star):   Labs Reviewed   COMPREHENSIVE METABOLIC PANEL - Abnormal; Notable for the following components:       Result Value    Glucose 134 (*)     All other components within normal limits    Narrative:     GFR Normal >60  Chronic Kidney Disease <60  Kidney Failure <15    The GFR formula is only valid for adults with stable renal function between ages 18 and 70.   TROPONIN - Abnormal; Notable for the following components:    HS Troponin T 29 (*)     All other components within normal limits    Narrative:     High Sensitive Troponin T Reference Range:  <10.0 ng/L- Negative Female for AMI  <15.0 ng/L- Negative Male for AMI  >=10 - Abnormal  Female indicating possible myocardial injury.  >=15 - Abnormal Male indicating possible myocardial injury.   Clinicians would have to utilize clinical acumen, EKG, Troponin, and serial changes to determine if it is an Acute Myocardial Infarction or myocardial injury due to an underlying chronic condition.        CBC WITH AUTO DIFFERENTIAL - Abnormal; Notable for the following components:    Lymphocyte % 17.2 (*)     Neutrophils, Absolute 7.96 (*)     All other components within normal limits   BLOOD GAS, ARTERIAL - Abnormal; Notable for the following components:    pH, Arterial 7.276 (*)     pCO2, Arterial 53.8 (*)     Base Excess, Arterial -2.8 (*)     All other components within normal limits   HIGH SENSITIVITIY TROPONIN T 2HR - Abnormal; Notable for the following components:    HS Troponin T 30 (*)     All other components within normal limits    Narrative:     High Sensitive Troponin T Reference Range:  <10.0 ng/L- Negative Female for AMI  <15.0 ng/L- Negative Male for AMI  >=10 - Abnormal Female indicating possible myocardial injury.  >=15 - Abnormal Male indicating possible myocardial injury.   Clinicians would have to utilize clinical acumen, EKG, Troponin, and serial changes to determine if it is an Acute Myocardial Infarction or myocardial injury due to an underlying chronic condition.        PROTIME-INR - Normal   LACTIC ACID, PLASMA - Normal   PROCALCITONIN - Normal    Narrative:     As a Marker for Sepsis (Non-Neonates):    1. <0.5 ng/mL represents a low risk of severe sepsis and/or septic shock.  2. >2 ng/mL represents a high risk of severe sepsis and/or septic shock.    As a Marker for Lower Respiratory Tract Infections that require antibiotic therapy:    PCT on Admission    Antibiotic Therapy       6-12 Hrs later    >0.5                Strongly Recommended  >0.25 - <0.5        Recommended   0.1 - 0.25          Discouraged              Remeasure/reassess PCT  <0.1                Strongly Discouraged     " Remeasure/reassess PCT    As 28 day mortality risk marker: \"Change in Procalcitonin Result\" (>80% or <=80%) if Day 0 (or Day 1) and Day 4 values are available. Refer to http://[email or web address]-[email or web address]-[email or web address]    Change in PCT <=80%  A decrease of PCT levels below or equal to 80% defines a positive change in PCT test result representing a higher risk for 28-day all-cause mortality of patients diagnosed with severe sepsis for septic shock.    Change in PCT >80%  A decrease of PCT levels of more than 80% defines a negative change in PCT result representing a lower risk for 28-day all-cause mortality of patients diagnosed with severe sepsis or septic shock.      BLOOD CULTURE   BLOOD CULTURE   BLOOD GAS, ARTERIAL   CBC AND DIFFERENTIAL    Narrative:     The following orders were created for panel order CBC & Differential.  Procedure                               Abnormality         Status                     ---------                               -----------         ------                     CBC Auto Differential[[id number]]        Abnormal            Final result                 Please view results for these tests on the individual orders.       EKG:   ECG 12 Lead Dyspnea   Final Result   HEART RATE= 79  bpm   RR Interval= 759  ms   GA Interval=   ms   P Horizontal Axis=   deg   P Front Axis=   deg   QRSD Interval= 137  ms   QT Interval= 421  ms   QRS Axis= -100  deg   T Wave Axis= 18  deg   - ABNORMAL ECG -   Rhythm analysis indeterminate due to severe baseline artifact   Electronically Signed By: Hitesh Pak ([facility]) 09-Jul-2023 22:16:42   Date and Time of Study: 2023-07-09 21:34:05          Meds given in ED:   Medications   sodium chloride 0.9 % flush 10 mL (has no administration in time range)   ipratropium-albuterol (DUO-NEB) nebulizer solution 3 mL (3 mL Nebulization Given 7/9/23 2136)   methylPREDNISolone sodium succinate (SOLU-Medrol) injection 125 mg (125 mg Intravenous Given 7/9/23 2145) "   piperacillin-tazobactam (ZOSYN) 3.375 g in iso-osmotic dextrose 50 ml (premix) (0 g Intravenous Stopped 7/9/23 6860)       Imaging results:  XR Chest 1 View    Result Date: 7/9/2023  Background chronic interstitial lung disease and pulmonary fibrosis, with suspected superimposed infiltrates,  This report was finalized on 7/9/2023 9:57 PM by Dr. Marisabel Soto M.D.       Ambulatory status:   - as tolerated     Social issues:   Social History     Socioeconomic History    Marital status:    Tobacco Use    Smoking status: Former    Smokeless tobacco: Never   Vaping Use    Vaping Use: Never used   Substance and Sexual Activity    Alcohol use: Yes     Alcohol/week: 7.0 standard drinks     Types: 7 Shots of liquor per week     Comment: occ    Drug use: No       NIH Stroke Scale:       Carmencita Greco RN  07/10/23 01:03 EDT

## 2023-07-10 NOTE — ED NOTES
Nursing report ED to floor  Brandon Yo  87 y.o.  male    HPI :   Chief Complaint   Patient presents with    Aspiration       Admitting doctor:   Alirio Mendez MD    Admitting diagnosis:   The primary encounter diagnosis was Acute respiratory failure with hypoxia. Diagnoses of Aspiration pneumonitis, COPD exacerbation, and Do not resuscitate were also pertinent to this visit.    Code status:   Current Code Status       Date Active Code Status Order ID Comments User Context       7/10/2023 1031 No CPR (Do Not Attempt to Resuscitate) [id number]  Alirio Mendez MD ED        Question Answer    Code Status (Patient has no pulse and is not breathing) No CPR (Do Not Attempt to Resuscitate)    Medical Interventions (Patient has pulse or is breathing) Limited Support    Medical Intervention Limits: NO intubation (DNI)     NO cardioversion                    Allergies:   Patient has no known allergies.    Isolation:   No active isolations    Intake and Output  No intake or output data in the 24 hours ending 07/10/23 1046    Weight:       07/09/23  2135   Weight: 70.4 kg (155 lb 3.3 oz)       Most recent vitals:   Vitals:    07/10/23 0405 07/10/23 0728 07/10/23 0806 07/10/23 0901   BP: 147/81  144/81 101/58   Pulse: 100 89 98 94   Resp: 24 20 18 18   Temp:       TempSrc:       SpO2: 90% 96% 91% 91%   Weight:       Height:           Active LDAs/IV Access:   Lines, Drains & Airways       Active LDAs       Name Placement date Placement time Site Days    Peripheral IV 07/09/23 2140 Anterior;Right Forearm 07/09/23 2140  Forearm  less than 1                    Labs (abnormal labs have a star):   Labs Reviewed   COMPREHENSIVE METABOLIC PANEL - Abnormal; Notable for the following components:       Result Value    Glucose 134 (*)     All other components within normal limits    Narrative:     GFR Normal >60  Chronic Kidney Disease <60  Kidney Failure <15    The GFR formula is only valid for adults with stable renal function between ages  18 and 70.   TROPONIN - Abnormal; Notable for the following components:    HS Troponin T 29 (*)     All other components within normal limits    Narrative:     High Sensitive Troponin T Reference Range:  <10.0 ng/L- Negative Female for AMI  <15.0 ng/L- Negative Male for AMI  >=10 - Abnormal Female indicating possible myocardial injury.  >=15 - Abnormal Male indicating possible myocardial injury.   Clinicians would have to utilize clinical acumen, EKG, Troponin, and serial changes to determine if it is an Acute Myocardial Infarction or myocardial injury due to an underlying chronic condition.        CBC WITH AUTO DIFFERENTIAL - Abnormal; Notable for the following components:    Lymphocyte % 17.2 (*)     Neutrophils, Absolute 7.96 (*)     All other components within normal limits   BLOOD GAS, ARTERIAL - Abnormal; Notable for the following components:    pH, Arterial 7.276 (*)     pCO2, Arterial 53.8 (*)     Base Excess, Arterial -2.8 (*)     All other components within normal limits   HIGH SENSITIVITIY TROPONIN T 2HR - Abnormal; Notable for the following components:    HS Troponin T 30 (*)     All other components within normal limits    Narrative:     High Sensitive Troponin T Reference Range:  <10.0 ng/L- Negative Female for AMI  <15.0 ng/L- Negative Male for AMI  >=10 - Abnormal Female indicating possible myocardial injury.  >=15 - Abnormal Male indicating possible myocardial injury.   Clinicians would have to utilize clinical acumen, EKG, Troponin, and serial changes to determine if it is an Acute Myocardial Infarction or myocardial injury due to an underlying chronic condition.        PROTIME-INR - Normal   LACTIC ACID, PLASMA - Normal   PROCALCITONIN - Normal    Narrative:     As a Marker for Sepsis (Non-Neonates):    1. <0.5 ng/mL represents a low risk of severe sepsis and/or septic shock.  2. >2 ng/mL represents a high risk of severe sepsis and/or septic shock.    As a Marker for Lower Respiratory Tract  "Infections that require antibiotic therapy:    PCT on Admission    Antibiotic Therapy       6-12 Hrs later    >0.5                Strongly Recommended  >0.25 - <0.5        Recommended   0.1 - 0.25          Discouraged              Remeasure/reassess PCT  <0.1                Strongly Discouraged     Remeasure/reassess PCT    As 28 day mortality risk marker: \"Change in Procalcitonin Result\" (>80% or <=80%) if Day 0 (or Day 1) and Day 4 values are available. Refer to http://[email or web address]-[email or web address]    Change in PCT <=80%  A decrease of PCT levels below or equal to 80% defines a positive change in PCT test result representing a higher risk for 28-day all-cause mortality of patients diagnosed with severe sepsis for septic shock.    Change in PCT >80%  A decrease of PCT levels of more than 80% defines a negative change in PCT result representing a lower risk for 28-day all-cause mortality of patients diagnosed with severe sepsis or septic shock.      BLOOD CULTURE   BLOOD CULTURE   BLOOD GAS, ARTERIAL   BNP (IN-HOUSE)   CBC AND DIFFERENTIAL    Narrative:     The following orders were created for panel order CBC & Differential.  Procedure                               Abnormality         Status                     ---------                               -----------         ------                     CBC Auto Differential[[id number]]        Abnormal            Final result                 Please view results for these tests on the individual orders.       EKG:   ECG 12 Lead Dyspnea   Final Result   HEART RATE= 79  bpm   RR Interval= 759  ms   DE Interval=   ms   P Horizontal Axis=   deg   P Front Axis=   deg   QRSD Interval= 137  ms   QT Interval= 421  ms   QRS Axis= -100  deg   T Wave Axis= 18  deg   - ABNORMAL ECG -   Rhythm analysis indeterminate due to severe baseline artifact   Electronically Signed By: Hitesh Pak ([facility]) 09-Jul-2023 22:16:42   Date and Time of Study: 2023-07-09 21:34:05          Meds given in " ED:   Medications   sodium chloride 0.9 % flush 10 mL (has no administration in time range)   ipratropium-albuterol (DUO-NEB) nebulizer solution 3 mL (3 mL Nebulization Given 7/10/23 0728)   sodium chloride 7 % nebulizer solution nebulizer solution 4 mL (4 mL Nebulization Not Given 7/10/23 1010)   methylPREDNISolone sodium succinate (SOLU-Medrol) injection 40 mg (has no administration in time range)   aspirin EC tablet 81 mg (has no administration in time range)   donepezil (ARICEPT) tablet 10 mg (has no administration in time range)   furosemide (LASIX) tablet 20 mg (has no administration in time range)   gabapentin (NEURONTIN) capsule 100 mg (has no administration in time range)   latanoprost (XALATAN) 0.005 % ophthalmic solution 1 drop (has no administration in time range)   memantine (NAMENDA) tablet 10 mg (has no administration in time range)   metoprolol succinate XL (TOPROL-XL) 24 hr tablet 50 mg (has no administration in time range)   pantoprazole (PROTONIX) EC tablet 40 mg (has no administration in time range)   piperacillin-tazobactam (ZOSYN) 3.375 g in iso-osmotic dextrose 50 ml (premix) (has no administration in time range)   ipratropium-albuterol (DUO-NEB) nebulizer solution 3 mL (3 mL Nebulization Given 7/9/23 2136)   methylPREDNISolone sodium succinate (SOLU-Medrol) injection 125 mg (125 mg Intravenous Given 7/9/23 2145)   piperacillin-tazobactam (ZOSYN) 3.375 g in iso-osmotic dextrose 50 ml (premix) (0 g Intravenous Stopped 7/9/23 2349)   ipratropium-albuterol (DUO-NEB) nebulizer solution 3 mL (3 mL Nebulization Given 7/10/23 6664)       Imaging results:  XR Chest 1 View    Result Date: 7/9/2023  Background chronic interstitial lung disease and pulmonary fibrosis, with suspected superimposed infiltrates,  This report was finalized on 7/9/2023 9:57 PM by Dr. Marisabel Soto M.D.       Ambulatory status:   - assist x2    Social issues:   Social History     Socioeconomic History    Marital status:     Tobacco Use    Smoking status: Former    Smokeless tobacco: Never   Vaping Use    Vaping Use: Never used   Substance and Sexual Activity    Alcohol use: Yes     Alcohol/week: 7.0 standard drinks     Types: 7 Shots of liquor per week     Comment: occ    Drug use: No       NIH Stroke Scale:       Desiree Hernández RN  07/10/23 10:46 EDT

## 2023-07-10 NOTE — ED NOTES
Pt began dessatting to 86-87% on 6L high flow NC. NRB at 15L replaced and RT called. Pt maintaining 90% O2 on NRB.

## 2023-07-10 NOTE — THERAPY EVALUATION
Acute Care - Speech Language Pathology   Swallow Initial Evaluation Psychiatric     Patient Name: Brandon Yo  : 1935  MRN: [id number]  Today's Date: 7/10/2023               Admit Date: 2023    Visit Dx:     ICD-10-CM ICD-9-CM   1. Acute respiratory failure with hypoxia  J96.01 518.81   2. Aspiration pneumonitis  J69.0 507.0   3. COPD exacerbation  J44.1 491.21   4. Do not resuscitate  Z66 V49.86     Patient Active Problem List   Diagnosis    Acute respiratory failure with hypoxia     Past Medical History:   Diagnosis Date    Acid reflux     Aspiration into respiratory tract     Chronic right shoulder pain     H/O cervical discectomy     20 years prior    Hypertension      Past Surgical History:   Procedure Laterality Date    BACK SURGERY         SLP Recommendation and Plan  SLP Swallowing Diagnosis: R/O pharyngeal dysphagia, suspected esophageal dysphagia (07/10/23 1330)  SLP Diet Recommendation: NPO, ice chips between meals after oral care, with supervision (07/10/23 1330)     SLP Rec. for Method of Medication Administration: meds via alternate route (07/10/23 1330)     Monitor for Signs of Aspiration: notify SLP if any concerns, yes (07/10/23 1330)  Recommended Diagnostics: other (see comments) (instrumental assessment of swallow pending clinical course) (07/10/23 1330)  Swallow Criteria for Skilled Therapeutic Interventions Met: demonstrates skilled criteria (07/10/23 1330)  Anticipated Discharge Disposition (SLP): unknown (07/10/23 1330)  Rehab Potential/Prognosis, Swallowing: good, to achieve stated therapy goals (07/10/23 1330)  Therapy Frequency (Swallow): PRN (07/10/23 1330)  Predicted Duration Therapy Intervention (Days): until discharge (07/10/23 1330)  Demonstrates Need for Referral to Another Service: gastroenterology (07/10/23 1330)                                     Plan of Care Reviewed With: patient, spouse, daughter  Outcome Evaluation: Clinical swallow eval completed. Concern for  dysphagia with post-prandial aspiration due to suspected gastroesophageal etiology. Suggest consider inpatient GI consult for consideration of EGD. Suggest consider NPO x ice chips. Non-oral meds.      SWALLOW EVALUATION (last 72 hours)       SLP Adult Swallow Evaluation       Row Name 07/10/23 3671       Rehab Evaluation    Document Type evaluation  -BB    Subjective Information no complaints  -BB    Patient Observations alert;agree to therapy;cooperative  -BB    Patient Effort good  -BB    Symptoms Noted During/After Treatment none  -BB       General Information    Patient Profile Reviewed yes  -BB    Pertinent History Of Current Problem 88 yo pt admitted with acute respiratory failiure with hypoxia. Concern for aspiration pna versus aspiration pneumonitis and COPD exacerbation. Pt's family reports pt coughed after eating last night. Pt admiited to hospital in [place] in April after coughing up coffee colored fluid. He was dxd with aspiration pna. He was found to have bowel obstruction while in the hospital. Pt's family reports pt with long-time hx of complaining of food sticking in his chest. Pt's family reports pt has coughed when eating meat about 4 times since he has been back home from rehab (over the last 7 weeks). Pt with pertinent phmx of COPD, interstitial lung disease, GERD, dementia.  -BB    Current Method of Nutrition NPO  pending SLP eval  -BB    Precautions/Limitations, Vision difficult to assess  -BB    Precautions/Limitations, Hearing WFL;for purposes of eval  -BB    Prior Level of Function-Communication cognitive-linguistic impairment  -BB    Prior Level of Function-Swallowing no diet consistency restrictions  -BB    Plans/Goals Discussed with patient;family;spouse/S.O.;agreed upon  -BB    Barriers to Rehab cognitive status  -BB       Pain    Additional Documentation Pain Scale: Numbers Pre/Post-Treatment (Group)  -BB       Pain Scale: Numbers Pre/Post-Treatment    Pretreatment Pain Rating 0/10 -  no pain  -BB    Posttreatment Pain Rating 0/10 - no pain  -BB       Oral Motor Structure and Function    Dentition Assessment natural, present and adequate  -BB    Mucosal Quality dry  -BB       Oral Musculature and Cranial Nerve Assessment    Oral Motor General Assessment WFL  -BB       General Eating/Swallowing Observations    Respiratory Support Currently in Use nasal cannula  -BB    Eating/Swallowing Skills self-fed;fed by SLP  -BB    Positioning During Eating upright 90 degree  -BB    Utensils Used spoon;cup  -BB    Consistencies Trialed pureed;thin liquids;ice chips;nectar/syrup-thick liquids  -BB       Clinical Swallow Eval    Clinical Swallow Evaluation Summary Clinical swallow eval completed. Pt's wife and dtr at bedside. Cognition: A/Ox person. Pt able to follow basic one-step commands. Pt alert and cooperative. Voice: Vocal quality appears good. Vocal loudness mildly reduced. Cough: Appears strong. Affect: Pleasant. Speech: 80-90% intelligible. Bulbar mech exam: Integrity of cranial nerves V, VII, IX/X and XII appear grossly intact. Lissett swallow protocol: not completed. Dysphagia symptoms: Pt's dtr and wife report pt tends to cough several minutes after eating (mucus comes up). Pt frequently c/o food sticking in chest. Dysphagia signs: Pt with repeat dry swallows with thin liquids, nectar thick liquids and puree; pt reports that it feels like food and liquids are slow to go down. Delayed wet cough after PO trials (1-5 min post); pt endorses pressure at sternal notch and chest. Active problems related to dysphagia management: concern for aspiration pna vs aspiration pneumonitis. Concern for COPD exacerbation. Chronic problems related to dysphagia management: COPD, interstitial lung disease, pulm fibrosis, GERD, small CVA. Assessment / Plan: Concern for dysphagia with post-prandial aspiration due to gastroesophageal etiology. Suggest consider inpatient GI consult for consideration of EGD. Suggest consider  NPO x ice chips. Non-oral meds.  -BB       SLP Evaluation Clinical Impression    SLP Swallowing Diagnosis R/O pharyngeal dysphagia;suspected esophageal dysphagia  -BB    Functional Impact risk of aspiration/pneumonia;risk of malnutrition;risk of dehydration  -BB    Rehab Potential/Prognosis, Swallowing good, to achieve stated therapy goals  -BB    Swallow Criteria for Skilled Therapeutic Interventions Met demonstrates skilled criteria  -BB       Recommendations    Therapy Frequency (Swallow) PRN  -BB    Predicted Duration Therapy Intervention (Days) until discharge  -BB    SLP Diet Recommendation NPO;ice chips between meals after oral care, with supervision  -BB    Recommended Diagnostics other (see comments)  instrumental assessment of swallow pending clinical course  -BB    Oral Care Recommendations Oral Care BID/PRN  -BB    SLP Rec. for Method of Medication Administration meds via alternate route  -BB    Monitor for Signs of Aspiration notify SLP if any concerns;yes  -BB    Anticipated Discharge Disposition (SLP) unknown  -BB    Demonstrates Need for Referral to Another Service gastroenterology  -BB              User Key  (r) = Recorded By, (t) = Taken By, (c) = Cosigned By      Initials Name Effective Dates    BB Rosalio Owen, SLP 02/19/23 -                     EDUCATION  The patient has been educated in the following areas:   Dysphagia (Swallowing Impairment) Oral Care/Hydration NPO rationale.        SLP GOALS       Row Name 07/10/23 1500             (LTG) Patient will demonstrate functional swallow for    Diet Texture (Demonstrate functional swallow) regular textures  -BB      Liquid viscosity (Demonstrate functional swallow) thin liquids  -BB      [place] (Demonstrate functional swallow) independently (over 90% accuracy)  -BB      Time Frame (Demonstrate functional swallow) by discharge  -BB      Progress/Outcomes (Demonstrate functional swallow) new goal  -BB         (STG) Patient will tolerate trials  of    Consistencies Trialed (Tolerate trials) regular textures  -BB      Desired Outcome (Tolerate trials) without signs/symptoms of aspiration  -BB      [place] (Tolerate trials) independently (over 90% accuracy)  -BB      Time Frame (Tolerate trials) by discharge  -BB      Progress/Outcomes (Tolerate trials) new goal  -BB                User Key  (r) = Recorded By, (t) = Taken By, (c) = Cosigned By      Initials Name Provider Type    Rosalio Brizuela SLP Speech and Language Pathologist                       Time Calculation:    Time Calculation- SLP       Row Name 07/10/23 1553             Time Calculation- SLP    SLP Start Time 1320  -BB      SLP Stop Time 1450  -BB      SLP Time Calculation (min) 90 min  -BB                User Key  (r) = Recorded By, (t) = Taken By, (c) = Cosigned By      Initials Name Provider Type    Rosalio Brizuela SLP Speech and Language Pathologist                    Therapy Charges for Today       Code Description Service Date Service Provider Modifiers Qty    [id number] HC ST EVAL ORAL PHARYNG SWALLOW 6 7/10/2023 Rosalio Owen SLP GN 1                 WAQAR Mark  7/10/2023

## 2023-07-10 NOTE — ED PROVIDER NOTES
EMERGENCY DEPARTMENT ENCOUNTER    Room Number:  14/14  Date seen:  7/9/2023  PCP: Mike Covarrubias MD  Historian: EMS      HPI:  Chief Complaint: Respiratory distress  A complete HPI/ROS/PMH/PSH/SH/FH are unobtainable due to: Respiratory distress and dementia  Context: Brandon Yo is a 87 y.o. male who presents to the ED via EMS from home for aspiration and shortness of breath.  Per EMS-Per the family the patient was just hospitalized in [place] for a month for aspiration pneumonia.  The family states at dinner tonight the patient aspirated in front of them.  They checked his oxygen saturations and noticed it was in the 80s and put him on 3 L of oxygen.  They state the patient has not been on oxygen recently.  EMS states the patient was in respiratory distress on their arrival with saturations in the low 80s on 3 L and rhonchi in all lung fields with secretions in his oropharynx.  They were able to suction out approximately 100 cc from his oropharynx and placed him on 100% nonrebreather.  The patient arrives to the ER with sats of 91% on a nonrebreather.  EMS states the patient has a history of COPD      PAST MEDICAL HISTORY  Active Ambulatory Problems     Diagnosis Date Noted   • No Active Ambulatory Problems     Resolved Ambulatory Problems     Diagnosis Date Noted   • No Resolved Ambulatory Problems     Past Medical History:   Diagnosis Date   • Acid reflux    • Aspiration into respiratory tract    • Chronic right shoulder pain    • H/O cervical discectomy    • Hypertension          REVIEW OF SYSTEMS  All systems reviewed and negative except for those discussed in HPI.       PAST SURGICAL HISTORY  Past Surgical History:   Procedure Laterality Date   • BACK SURGERY           FAMILY HISTORY  History reviewed. No pertinent family history.      SOCIAL HISTORY  Social History     Socioeconomic History   • Marital status:    Tobacco Use   • Smoking status: Former   • Smokeless tobacco: Never   Vaping Use   •  Vaping Use: Never used   Substance and Sexual Activity   • Alcohol use: Yes     Alcohol/week: 7.0 standard drinks     Types: 7 Shots of liquor per week     Comment: occ   • Drug use: No         ALLERGIES  Patient has no known allergies.      PHYSICAL EXAM  ED Triage Vitals   Temp Pulse Resp BP SpO2   -- -- -- -- --      Temp src Heart Rate Source Patient Position BP Location FiO2 (%)   -- -- -- -- --       Physical Exam      GENERAL: Thin 87-year-old male in moderate respiratory distress  HENT: NCAT: nares patent: Neck supple  EYES: no scleral icterus  CV: regular rhythm, tachycardic to 110  RESPIRATORY: Moderate respiratory distress with rhonchi in all lung fields  ABDOMEN: soft, NTND: Bowel sounds positive  MUSCULOSKELETAL: no deformity  NEURO: alert with nonfocal neuro exam  PSYCH:  calm, cooperative  SKIN: warm, dry    Vital signs and nursing notes reviewed.      LAB RESULTS  Recent Results (from the past 24 hour(s))   ECG 12 Lead Dyspnea    Collection Time: 07/09/23  9:34 PM   Result Value Ref Range    QT Interval 421 ms   Comprehensive Metabolic Panel    Collection Time: 07/09/23  9:41 PM    Specimen: Blood   Result Value Ref Range    Glucose 134 (H) 65 - 99 mg/dL    BUN 20 8 - 23 mg/dL    Creatinine 1.04 0.76 - 1.27 mg/dL    Sodium 139 136 - 145 mmol/L    Potassium 4.4 3.5 - 5.2 mmol/L    Chloride 103 98 - 107 mmol/L    CO2 22.3 22.0 - 29.0 mmol/L    Calcium 9.3 8.6 - 10.5 mg/dL    Total Protein 7.8 6.0 - 8.5 g/dL    Albumin 4.0 3.5 - 5.2 g/dL    ALT (SGPT) 16 1 - 41 U/L    AST (SGOT) 17 1 - 40 U/L    Alkaline Phosphatase 83 39 - 117 U/L    Total Bilirubin 0.2 0.0 - 1.2 mg/dL    Globulin 3.8 gm/dL    A/G Ratio 1.1 g/dL    BUN/Creatinine Ratio 19.2 7.0 - 25.0    Anion Gap 13.7 5.0 - 15.0 mmol/L    eGFR 69.9 >60.0 mL/min/1.73   Protime-INR    Collection Time: 07/09/23  9:41 PM    Specimen: Blood   Result Value Ref Range    Protime 13.8 11.7 - 14.2 Seconds    INR 1.04 0.90 - 1.10   High Sensitivity Troponin T     Collection Time: 07/09/23  9:41 PM    Specimen: Blood   Result Value Ref Range    HS Troponin T 29 (H) <15 ng/L   Lactic Acid, Plasma    Collection Time: 07/09/23  9:41 PM    Specimen: Blood   Result Value Ref Range    Lactate 1.6 0.5 - 2.0 mmol/L   Procalcitonin    Collection Time: 07/09/23  9:41 PM    Specimen: Blood   Result Value Ref Range    Procalcitonin 0.03 0.00 - 0.25 ng/mL   CBC Auto Differential    Collection Time: 07/09/23  9:41 PM    Specimen: Blood   Result Value Ref Range    WBC 10.51 3.40 - 10.80 10*3/mm3    RBC 4.93 4.14 - 5.80 10*6/mm3    Hemoglobin 14.4 13.0 - 17.7 g/dL    Hematocrit 45.5 37.5 - 51.0 %    MCV 92.3 79.0 - 97.0 fL    MCH 29.2 26.6 - 33.0 pg    MCHC 31.6 31.5 - 35.7 g/dL    RDW 12.4 12.3 - 15.4 %    RDW-SD 42.0 37.0 - 54.0 fl    MPV 10.6 6.0 - 12.0 fL    Platelets 295 140 - 450 10*3/mm3    Neutrophil % 75.8 42.7 - 76.0 %    Lymphocyte % 17.2 (L) 19.6 - 45.3 %    Monocyte % 5.2 5.0 - 12.0 %    Eosinophil % 1.0 0.3 - 6.2 %    Basophil % 0.3 0.0 - 1.5 %    Immature Grans % 0.5 0.0 - 0.5 %    Neutrophils, Absolute 7.96 (H) 1.70 - 7.00 10*3/mm3    Lymphocytes, Absolute 1.81 0.70 - 3.10 10*3/mm3    Monocytes, Absolute 0.55 0.10 - 0.90 10*3/mm3    Eosinophils, Absolute 0.11 0.00 - 0.40 10*3/mm3    Basophils, Absolute 0.03 0.00 - 0.20 10*3/mm3    Immature Grans, Absolute 0.05 0.00 - 0.05 10*3/mm3    nRBC 0.0 0.0 - 0.2 /100 WBC   Blood Gas, Arterial -    Collection Time: 07/09/23  9:53 PM    Specimen: Arterial Blood   Result Value Ref Range    Site Arterial: left radial     Nathan's Test Positive     pH, Arterial 7.276 (C) 7.350 - 7.450 pH units    pCO2, Arterial 53.8 (H) 35.0 - 45.0 mm Hg    pO2, Arterial 81.0 80.0 - 100.0 mm Hg    HCO3, Arterial 25.0 22.0 - 28.0 mmol/L    Base Excess, Arterial -2.8 (L) 0.0 - 2.0 mmol/L    O2 Saturation Calculated 93.9 92.0 - 99.0 %    Barometric Pressure for Blood Gas 747.8 mmHg    Modality NRB     Flow Rate 15 lpm    Rate 22 Breaths/minute       Ordered the  above labs and reviewed the results.        RADIOLOGY  XR Chest 1 View    Result Date: 7/9/2023  SINGLE VIEW OF THE CHEST  HISTORY: Possible aspiration  COMPARISON: 10/11/2018  FINDINGS: Cardiac silhouette is unchanged. There is calcification of the aorta. No pneumothorax or pleural effusion is seen. The patient has elevation of the right diaphragm, as well as background chronic interstitial lung disease with pulmonary fibrosis. Similar findings were present on prior study from 10/17/2019, although I think there may be superimposed infiltrates within both lungs.      Background chronic interstitial lung disease and pulmonary fibrosis, with suspected superimposed infiltrates,  This report was finalized on 7/9/2023 9:57 PM by Dr. Marisabel Soto M.D.        Ordered the above noted radiological studies. Reviewed by me in PACS.            PROCEDURES  Procedures          MEDICATIONS GIVEN IN ER  Medications   sodium chloride 0.9 % flush 10 mL (has no administration in time range)   piperacillin-tazobactam (ZOSYN) 3.375 g in iso-osmotic dextrose 50 ml (premix) (has no administration in time range)   ipratropium-albuterol (DUO-NEB) nebulizer solution 3 mL (3 mL Nebulization Given 7/9/23 2136)   methylPREDNISolone sodium succinate (SOLU-Medrol) injection 125 mg (125 mg Intravenous Given 7/9/23 2145)             MEDICAL DECISION MAKING, PROGRESS, and CONSULTS    All labs have been independently reviewed by me.  All radiology studies have been reviewed by me and I have also reviewed the radiology report.   EKG's independently viewed and interpreted by me.  Discussion below represents my analysis of pertinent findings related to patient's condition, differential diagnosis, treatment plan and final disposition.      Additional sources:  - Discussed/ obtained information from independent historians: EMS states the patient aspirated just prior to arrival and was hypoxic.  They were able to suction food and secretions out of his  airway in route.        - Chronic or social conditions impacting care: The patient has just returned home from a prolonged hospitalization and rehab stay in [place].  He has been home for 1 week and has not required oxygen while being at home    - Shared decision making: After shared decision-making discussion to myself, the patient and his family we agree he needs to be admitted to the hospital for further evaluation and care      Orders placed during this visit:  Orders Placed This Encounter   Procedures   • Blood Culture - Blood,   • Blood Culture - Blood,   • XR Chest 1 View   • Comprehensive Metabolic Panel   • Protime-INR   • Blood Gas, Arterial -   • High Sensitivity Troponin T   • Lactic Acid, Plasma   • Procalcitonin   • CBC Auto Differential   • Blood Gas, Arterial -   • High Sensitivity Troponin T 2Hr   • Monitor Blood Pressure   • Pulse Oximetry, Continuous   • LIPPS (on-call MD unless specified)   • Nasotracheal Suctioning (RT)   • ECG 12 Lead Dyspnea   • Insert Peripheral IV   • Inpatient Admission   • CBC & Differential         Differential diagnosis:  My differential diagnosis includes but is not limited to pneumonia, congestive heart failure, pulmonary embolism, pleural effusion, acute coronary syndrome, chronic obstructive pulmonary disease exacerbation, or pneumothorax.      Independent interpretation of labs, radiology studies, and discussions with consultants:  ED Course as of 07/09/23 2249   Sun Jul 09, 2023 2139 I have asked respiratory therapy to NT suction the patient and give him a DuoNeb while give him Solu-Medrol, check labs and a chest x-ray for further evaluation. [GP]   2213 My independent interpretation of the patient's chest x-ray is chronic right hemidiaphragm elevation with interstitial lung disease, pulmonary fibrosis and multifocal infiltrates [GP]   2214 Respiratory therapy has been working with the patient and has had success at removing secretions and even a small piece of  food via nasotracheal suctioning.  We have been able to wean him down to 10 L of high flow oxygen at this time.  The patient has obviously aspirated and I will cover him with Zosyn. [GP]   2216 EKG  Significant artifact due to respiratory distress  EKG time: 2134  Rhythm/Rate: Normal sinus rhythm at 79  No Acute Ischemia  Non-Specific ST-T changes    Interpreted Contemporaneously by me.  Independently viewed by me     [GP]   2229 Currently the patient has been able to be weaned down to 5 L keep his oxygen saturations in the mid 90s.  His wife and daughter are now here he states that has history of chronic lung disease and is a DO NOT RESUSCITATE.  His PCP is Dr. Covarrubias. [GP]   2245 I discussed the case with Dr. Mendez.  He is aware of the patient's aspiration pneumonia and history of interstitial lung disease.  He is aware the patient is a DO NOT RESUSCITATE.  He will admit the patient to a telemetry bed and consult pulmonary. [GP]      ED Course User Index  [GP] Carmine Bourne MD               DIAGNOSIS  Final diagnoses:   Acute respiratory failure with hypoxia   Aspiration pneumonitis   COPD exacerbation   Do not resuscitate         DISPOSITION  ADMISSION    Discussed treatment plan and reason for admission with pt/family and admitting physician.  Pt/family voiced understanding of the plan for admission for further testing/treatment as needed.            Latest Documented Vital Signs:  As of 22:49 EDT  BP- (!) 137/107 HR- 81 Temp- 95.2 °F (35.1 °C) (Tympanic) O2 sat- 96% on 5L--      --------------------  Please note that portions of this were completed with a voice recognition program.       Note Disclaimer: At [facility], we believe that sharing information builds trust and better relationships. You are receiving this note because you are receiving care at [facility] or recently visited. It is possible you will see health information before a provider has talked with you about it. This kind of  information can be easy to misunderstand. To help you fully understand what it means for your health, we urge you to discuss this note with your provider.           Carmine Bourne MD  07/09/23 0881

## 2023-07-10 NOTE — CONSULTS
CONSULT NOTE    Patient Identification:  Brandon Yo  87 y.o.  male  1935  [id number]            Requesting physician: Dr. Mendez    Reason for Consultation:  respiratory distress    CC: Aspiration, shortness of breath    History of Present Illness:  Brandon Yo is a 7-year-old male with a past medical history of hypertension, COPD, dementia and aspiration.  Previous CT exams of his chest show emphysematous changes, patient has had a sniff test due to chronic right hemidiaphragm elevation-sniff test showed normal movement of bilateral diaphragm.    He was admitted in April 2023 for shortness of breath and hypertensive emergency.  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  He was weaned off supplemental oxygen and discharged to a skilled nursing facility.    Patient returned to the emergency department via EMS after another aspiration event at home.  Patient's family reported that he was eating dinner tonight and had a witnessed aspiration.  His oxygen saturation was checked and his SPO2 was in the 80%'s, so patient was placed on 3 L of oxygen.  EMS was called, and patient required suctioning of his oropharynx in route with approximately 100 mL of gastric contents.  Patient was placed on a nonrebreather, upon arrival to the ED patient's SPO2 was 91% on nonrebreather.  Patient was NT suctioned upon arrival to the ED with improvement in oxygen saturation.  He was started on Zosyn for empiric coverage.  He then developed worsening respiratory distress, requiring increasing amounts of supplemental oxygen.  We were asked to see patient due to respiratory distress.    At time of assessment, patient is on 8 L high flow nasal cannula and oxygenating at 94%.  Sound coarse throughout, patient is continuously coughing up what appears to be GI contents.  Confirmed  patient is a DO NOT RESUSCITATE/DO NOT INTUBATE per his wishes.    ED evaluation: High-sensitivity troponin 29, creatinine 1.04, glucose 134, lactate 1.6, procalcitonin 0.03, WBC 10.51.  CXR showed chronic interstitial lung disease with pulmonary fibrosis with suspected superimposed infiltrates in bilateral lungs.  ABG showed uncompensated respiratory acidosis with a pH of 7.276, PaCO2 53.8, PaO2 81, HCO3 25.0 (on 15 L nonrebreather).      Review of Systems:  Constitutional: Negative for activity change, appetite change, chills, fatigue or fever.  HEENT: Negative for congestion, sinus pain or pressure.  Respiratory: Negative for chest tightness.  Positive for choking, cough, shortness of breath.  Cardiac: Negative for chest pain, leg swelling or palpitations.  GI: Negative for distention, constipation, diarrhea, nausea or vomiting.  : Negative for dysuria, frequency or urgency.  Musculoskeletal: Negative for arthralgias or myalgias.  Skin: Negative for color change, pallor, rash or wound.  Neurological: Negative for dizziness, headaches, headedness or weakness.    Past Medical History:  Past Medical History:   Diagnosis Date    Acid reflux     Aspiration into respiratory tract     Chronic right shoulder pain     H/O cervical discectomy     20 years prior    Hypertension        Past Surgical History:  Past Surgical History:   Procedure Laterality Date    BACK SURGERY          Home Meds:  (Not in a hospital admission)      Allergies:  No Known Allergies    Social History:   Social History     Socioeconomic History    Marital status:    Tobacco Use    Smoking status: Former    Smokeless tobacco: Never   Vaping Use    Vaping Use: Never used   Substance and Sexual Activity    Alcohol use: Yes     Alcohol/week: 7.0 standard drinks     Types: 7 Shots of liquor per week     Comment: occ    Drug use: No       Family History:  History reviewed. No pertinent family history.    Physical Exam:  /85   Pulse 98    "Temp 97.7 °F (36.5 °C) (Oral)   Resp 24   Ht 175.3 cm (69\")   Wt 70.4 kg (155 lb 3.3 oz)   SpO2 93%   BMI 22.92 kg/m²  Body mass index is 22.92 kg/m². 93% 70.4 kg (155 lb 3.3 oz)    Physical Exam:  Constitutional: Alert, elderly, chronically ill-appearing male sitting up in bed, coughing frequently, but does not appear to be in respiratory distress at this time.  Head: Normocephalic, atraumatic.  Mouth/throat: Moist mucous membranes.  Eyes: PERRLA, intact, normal conjunctiva.  Neck: Normal range of motion, supple, no JVD, trachea midline  Cardiovascular: Tachycardic rate, regular rhythm.  Respiratory: Oxygenating appropriately on 8 L high flow nasal cannula, rhonchi present in all lung fields, coughing frequently.  Abdomen: Soft, normal bowel sounds, nontender  Musculoskeletal: No joint swelling or tenderness.  No edema.  Skin: Warm, dry, no erythema or ecchymosis.  Capillary fill less than 3 seconds  Neurological: Alert and oriented to person place and time, no focal deficit.    LABS:  No results found for: COVID19    Lab Results   Component Value Date    CALCIUM 9.3 07/09/2023     Results from last 7 days   Lab Units 07/09/23  2141   SODIUM mmol/L 139   POTASSIUM mmol/L 4.4   CHLORIDE mmol/L 103   CO2 mmol/L 22.3   BUN mg/dL 20   CREATININE mg/dL 1.04   GLUCOSE mg/dL 134*   CALCIUM mg/dL 9.3   WBC 10*3/mm3 10.51   HEMOGLOBIN g/dL 14.4   PLATELETS 10*3/mm3 295   ALT (SGPT) U/L 16   AST (SGOT) U/L 17   PROCALCITONIN ng/mL 0.03     Lab Results   Component Value Date    TROPONINT 30 (H) 07/09/2023     Results from last 7 days   Lab Units 07/09/23  2353 07/09/23  2141   HSTROP T ng/L 30* 29*         Results from last 7 days   Lab Units 07/09/23  2141   PROCALCITONIN ng/mL 0.03   LACTATE mmol/L 1.6     Results from last 7 days   Lab Units 07/09/23  2153   PH, ARTERIAL pH units 7.276*   PCO2, ARTERIAL mm Hg 53.8*   PO2 ART mm Hg 81.0   FLOW RATE lpm 15   MODALITY  NRB   O2 SATURATION CALC % 93.9         Results from " last 7 days   Lab Units 07/09/23  2141   INR  1.04         No results found for: TSH  Estimated Creatinine Clearance: 50.8 mL/min (by C-G formula based on SCr of 1.04 mg/dL).         Imaging: I personally visualized the images of scans/x-rays performed within last 3 days.      Assessment:  Acute respiratory failure with hypoxia  Acute respiratory failure with hypercapnia  Aspiration pneumonitis versus pneumonia  Dysphagia  COPD/emphysema  Chronically elevated right hemidiaphragm  Hypertension  Dementia    Recommendations:  Acute respiratory failure with hypoxia and hypercapnia  Continue to encourage pulmonary toileting to clear lungs of food particles.  Scheduled DuoNebs and hypertonic saline nebulizers ordered.  As needed NT suctioning.  Continue supplemental oxygen to maintain SPO2 greater than 88%-patient is currently on a liter high flow nasal cannula and SPO2 94%.  Heated high flow/Optiflow nasal cannula ordered for hypercapnia-patient will be intolerant to BiPAP at this time as he is still actively coughing up food particles, hopefully Optiflow will provide pressure to help blow off CO2.    Aspiration pneumonitis versus pneumonia  Zosyn to cover for aspiration pneumonia.    Dysphagia  NPO.  SLP consult.    COPD/emphysema  No wheezing heard on exam, scheduled bronchodilators ordered.    Chronically elevated right hemidiaphragm  Sniff test performed in 2018 showed no evidence of unilateral diaphragmatic paralysis.    Patient was placed in face mask upon entering room and kept mask on throughout our encounter. I wore full protective equipment throughout this patient encounter including a face mask, gown and gloves. Hand hygiene was performed before donning protective equipment and after removal when leaving the room.    Nilam Walker, APRN  7/10/2023  02:16 EDT      Much of this encounter note is an electronic transcription/translation of spoken language to printed text using Dragon Software.

## 2023-07-10 NOTE — CONSULTS
"Nutrition Services    Patient Name:  Brandon Yo  YOB: 1935  MRN: [id number]  Admit Date:  7/9/2023    Assessment Date:  07/10/23    Comment: Nutrition consult per nurse admission screen.  Admitted due to aspiration episode.  Acute respiratory failure with hypoxia.  DNR.  NPO, needs SLP evaluation.  PMH includes COPD, dementia, HTN, CHF, GERD, aspiration.    RD to follow closely for plans for nutrition.    CLINICAL NUTRITION ASSESSMENT      Reason for Assessment Nurse or Nurse Practitioner Consult     Diagnosis/Problem   Acute respiratory failure with hypoxia    Medical/Surgical History Past Medical History:   Diagnosis Date    Acid reflux     Aspiration into respiratory tract     Chronic right shoulder pain     H/O cervical discectomy     20 years prior    Hypertension        Past Surgical History:   Procedure Laterality Date    BACK SURGERY          Encounter Information        Nutrition History:     Food Preferences:    Supplements:    Factors Affecting Intake: altered respiratory status, swallow impairment     Anthropometrics        Current Height  Current Weight  BMI kg/m2 Height: 175.3 cm (69\")  Weight: 70.4 kg (155 lb 3.3 oz) (07/09/23 2135)  Body mass index is 22.92 kg/m².   Adjusted BMI (if applicable)    BMI Category Normal/Healthy (18.4 - 24.9)       Admission Weight 155 lb (7/9)       Ideal Body Weight (IBW) 160 lb (72.7 kg)   Adjusted IBW (if applicable)        Usual Body Weight (UBW) Unsure   Weight Change/Trend Loss (162 lb 4/25)       Weight History Wt Readings from Last 30 Encounters:   07/09/23 2135 70.4 kg (155 lb 3.3 oz)           --  Tests/Procedures        Tests/Procedures No new tests/procedures     Labs       Pertinent Labs    Results from last 7 days   Lab Units 07/09/23  2141   SODIUM mmol/L 139   POTASSIUM mmol/L 4.4   CHLORIDE mmol/L 103   CO2 mmol/L 22.3   BUN mg/dL 20   CREATININE mg/dL 1.04   CALCIUM mg/dL 9.3   BILIRUBIN mg/dL 0.2   ALK PHOS U/L 83   ALT (SGPT) U/L " 16   AST (SGOT) U/L 17   GLUCOSE mg/dL 134*     Results from last 7 days   Lab Units 07/09/23  2141   HEMOGLOBIN g/dL 14.4   HEMATOCRIT % 45.5   WBC 10*3/mm3 10.51   ALBUMIN g/dL 4.0     Results from last 7 days   Lab Units 07/09/23  2141   INR  1.04   PLATELETS 10*3/mm3 295     No results found for: COVID19  No results found for: HGBA1C       Medications           Scheduled Medications aspirin, 81 mg, Oral, Daily  budesonide-formoterol, 2 puff, Inhalation, BID - RT  donepezil, 10 mg, Oral, Daily  furosemide, 20 mg, Oral, Daily  gabapentin, 100 mg, Oral, TID  guaiFENesin, 600 mg, Oral, Q12H  ipratropium-albuterol, 3 mL, Nebulization, 4x Daily - RT  latanoprost, 1 drop, Both Eyes, Nightly  memantine, 10 mg, Oral, BID  methylPREDNISolone sodium succinate, 40 mg, Intravenous, Q12H  metoprolol succinate XL, 50 mg, Oral, Daily  pantoprazole, 40 mg, Oral, BID AC  piperacillin-tazobactam, 3.375 g, Intravenous, Q8H  sodium chloride, 4 mL, Nebulization, BID - RT       Infusions     PRN Medications   [COMPLETED] Insert Peripheral IV **AND** sodium chloride     Physical Findings          Physical Appearance confused, disoriented, on oxygen therapy   Oral/Mouth Cavity tooth or teeth missing   Edema  lower extremity , 1+ (trace), 2+ (mild)   Gastrointestinal fecal incontinence, last bowel movement:7/7   Skin  bruising, excoriation, skin tear, other:L heel wound, abrasion   Tubes/Drains/Lines none   NFPE Not indicated at this time   --  Current Nutrition Orders & Evaluation of Intake       Oral Nutrition     Food Allergies NKFA   Current PO Diet NPO Diet NPO Type: Sips with Meds   Supplement n/a   PO Evaluation     % PO Intake N/a    # of Days Evaluated    --  PES STATEMENT / NUTRITION DIAGNOSIS      Nutrition Dx Problem  Problem: Inadequate Oral Intake  Etiology: Medical Diagnosis acute respiratory failure with hypoxia, aspiration  Signs/Symptoms: NPO and SLP/Swallow Evaluation pending    Comment:    --  NUTRITION INTERVENTION /  PLAN OF CARE      Intervention Goal(s) Maintain nutrition status, Reduce/improve symptoms, Disease management/therapy, Initiate feeding/diet, and Maintain weight         RD Intervention/Action Await begin PO diet, Follow Tx Progress, and Care plan reviewed         Prescription/Orders:       PO Diet       Supplements       Snacks       Enteral Nutrition       Parenteral Nutrition    New Prescription Ordered? No changes at this time   --      Monitor/Evaluation Per protocol, I&O, Pertinent labs, Weight, Skin status, Symptoms, Swallow function   Discharge Plan/Needs Pending clinical course   Education Will instruct as appropriate   --    RD to follow per protocol.      Electronically signed by:  Miroslava Mariee RD  07/10/23 15:24 EDT

## 2023-07-10 NOTE — NURSING NOTE
07/10/23 1501   Wound 07/10/23 1412 Left posterior heel   Placement Date/Time: 07/10/23 1412   Present on Hospital Admission: Yes  Side: Left  Orientation: posterior  Location: heel   Pressure Injury Stage U   Base black eschar  (soft eschar)   Periwound intact;dry   Periwound Temperature warm   Wound Length (cm) 2.2 cm   Wound Width (cm) 4.5 cm   Wound Surface Area (cm^2) 9.9 cm^2   Drainage Characteristics/Odor tan   Drainage Amount small   Care, Wound cleansed with;sterile normal saline   Dressing Care dressing changed  (Thera Honey with Opticel placed on wound, covered with Optifoam. Heel off-loaded with heel boot)   Skin Interventions   Pressure Reduction Devices pressure-redistributing mattress utilized;heel offloading device utilized   Pressure Reduction Techniques heels elevated off bed;positioned off wounds;pressure points protected     CWON note: pt seen for evaluation of unstageable pressure injury to left heel POA. He is also noted to have a small area of black eschar to left second toe. Eschar is soft and loose at the edges, small amount of tan drainage. Dressing changed and heel boot placed. Right heel with blanchable erythema. Wound care and prevention standing orders placed into [facility]. Discussed with RNMore.

## 2023-07-10 NOTE — PLAN OF CARE
Goal Outcome Evaluation: Pt resting in bed with no signs of distress noted at this time. Pt unable to swallow pills at this time. SLP has suggested a GI consult. VS stable. Bed in lowest position with siderails up X2. Call light within reach. RN will continue to monitor.

## 2023-07-10 NOTE — H&P
"History and physical    Primary care physician  Dr. PARIKH    Chief complaint  Shortness of breath    History of present illness  87-year-old white male with history of COPD dementia hypertension congestive heart failure and gastroesophageal reflux disease brought to the emergency room by the family with respiratory distress shortness of breath and found to be hypoxic.  Patient evaluated in ER and work-up revealed acute hypoxic respiratory failure with COPD exhibition and aspiration pneumonitis admitted for management.  Patient is demented unable to give detailed history most of the history obtained from the chart family nursing staff and old record.  Patient is DNR per his wishes.    PAST MEDICAL HISTORY   Hypertension      Dementia      COPD      Congestive heart failure      Gastroesophageal reflux disease        PAST SURGICAL HISTORY              Procedure Laterality Date    BACK SURGERY             FAMILY HISTORY  History reviewed. No pertinent family history.     SOCIAL HISTORY                 Socioeconomic History    Marital status:    Tobacco Use    Smoking status: Former    Smokeless tobacco: Never   Vaping Use    Vaping Use: Never used   Substance and Sexual Activity    Alcohol use: Yes       Alcohol/week: 7.0 standard drinks       Types: 7 Shots of liquor per week       Comment: occ    Drug use: No         ALLERGIES  Patient has no known allergies.  Home medications reviewed     REVIEW OF SYSTEMS  All systems reviewed and negative except for those discussed in HPI.      PHYSICAL EXAM  Blood pressure 108/56, pulse 98, temperature 97.5 °F (36.4 °C), temperature source Oral, resp. rate 18, height 175.3 cm (69\"), weight 70.4 kg (155 lb 3.3 oz), SpO2 91 %.    GENERAL: Awake and alert in mild distress and pleasantly confused  HENT: NCAT: nares patent: Neck supple  EYES: no scleral icterus  CV: regular rhythm, tachycardic   RESPIRATORY: Moderate respiratory distress with rhonchi in all lung " fields  ABDOMEN: soft, NTND: Bowel sounds positive  MUSCULOSKELETAL: no deformity  NEURO: alert with nonfocal neuro exam  PSYCH:  calm, cooperative  SKIN: warm, dry     LAB RESULTS  Lab Results (last 24 hours)       Procedure Component Value Units Date/Time    High Sensitivity Troponin T 2Hr [[id number]]  (Abnormal) Collected: 07/09/23 2353    Specimen: Blood Updated: 07/10/23 0024     HS Troponin T 30 ng/L      Troponin T Delta 1 ng/L     Narrative:      High Sensitive Troponin T Reference Range:  <10.0 ng/L- Negative Female for AMI  <15.0 ng/L- Negative Male for AMI  >=10 - Abnormal Female indicating possible myocardial injury.  >=15 - Abnormal Male indicating possible myocardial injury.   Clinicians would have to utilize clinical acumen, EKG, Troponin, and serial changes to determine if it is an Acute Myocardial Infarction or myocardial injury due to an underlying chronic condition.         Blood Culture - Blood, Arm, Right [[id number]] Collected: 07/09/23 2249    Specimen: Blood from Arm, Right Updated: 07/09/23 2253    High Sensitivity Troponin T [[id number]]  (Abnormal) Collected: 07/09/23 2141    Specimen: Blood Updated: 07/09/23 2214     HS Troponin T 29 ng/L     Narrative:      High Sensitive Troponin T Reference Range:  <10.0 ng/L- Negative Female for AMI  <15.0 ng/L- Negative Male for AMI  >=10 - Abnormal Female indicating possible myocardial injury.  >=15 - Abnormal Male indicating possible myocardial injury.   Clinicians would have to utilize clinical acumen, EKG, Troponin, and serial changes to determine if it is an Acute Myocardial Infarction or myocardial injury due to an underlying chronic condition.         Procalcitonin [[id number]]  (Normal) Collected: 07/09/23 2141    Specimen: Blood Updated: 07/09/23 2214     Procalcitonin 0.03 ng/mL     Narrative:      As a Marker for Sepsis (Non-Neonates):    1. <0.5 ng/mL represents a low risk of severe sepsis and/or septic shock.  2. >2 ng/mL represents a high  "risk of severe sepsis and/or septic shock.    As a Marker for Lower Respiratory Tract Infections that require antibiotic therapy:    PCT on Admission    Antibiotic Therapy       6-12 Hrs later    >0.5                Strongly Recommended  >0.25 - <0.5        Recommended   0.1 - 0.25          Discouraged              Remeasure/reassess PCT  <0.1                Strongly Discouraged     Remeasure/reassess PCT    As 28 day mortality risk marker: \"Change in Procalcitonin Result\" (>80% or <=80%) if Day 0 (or Day 1) and Day 4 values are available. Refer to http://www.[facility]-pct-calculator.com    Change in PCT <=80%  A decrease of PCT levels below or equal to 80% defines a positive change in PCT test result representing a higher risk for 28-day all-cause mortality of patients diagnosed with severe sepsis for septic shock.    Change in PCT >80%  A decrease of PCT levels of more than 80% defines a negative change in PCT result representing a lower risk for 28-day all-cause mortality of patients diagnosed with severe sepsis or septic shock.       Protime-INR [[id number]]  (Normal) Collected: 07/09/23 2141    Specimen: Blood Updated: 07/09/23 2213     Protime 13.8 Seconds      INR 1.04    Comprehensive Metabolic Panel [[id number]]  (Abnormal) Collected: 07/09/23 2141    Specimen: Blood Updated: 07/09/23 2208     Glucose 134 mg/dL      BUN 20 mg/dL      Creatinine 1.04 mg/dL      Sodium 139 mmol/L      Potassium 4.4 mmol/L      Chloride 103 mmol/L      CO2 22.3 mmol/L      Calcium 9.3 mg/dL      Total Protein 7.8 g/dL      Albumin 4.0 g/dL      ALT (SGPT) 16 U/L      AST (SGOT) 17 U/L      Alkaline Phosphatase 83 U/L      Total Bilirubin 0.2 mg/dL      Globulin 3.8 gm/dL      A/G Ratio 1.1 g/dL      BUN/Creatinine Ratio 19.2     Anion Gap 13.7 mmol/L      eGFR 69.9 mL/min/1.73     Narrative:      GFR Normal >60  Chronic Kidney Disease <60  Kidney Failure <15    The GFR formula is only valid for adults with stable renal function " between ages 18 and 70.    Lactic Acid, Plasma [[id number]]  (Normal) Collected: 07/09/23 2141    Specimen: Blood Updated: 07/09/23 2206     Lactate 1.6 mmol/L     Blood Culture - Blood, Arm, Right [[id number]] Collected: 07/09/23 2156    Specimen: Blood from Arm, Right Updated: 07/09/23 2200    Blood Gas, Arterial - [[id number]]  (Abnormal) Collected: 07/09/23 2153    Specimen: Arterial Blood Updated: 07/09/23 2156     Site Arterial: left radial     Nathan's Test Positive     pH, Arterial 7.276 pH units      Comment: SAT 94% Meter: [id number] : [id number] Debi KingCritical:Notify Dr WILLAMS (09-Jul-23 21:55:13)Read back ok        pCO2, Arterial 53.8 mm Hg      pO2, Arterial 81.0 mm Hg      HCO3, Arterial 25.0 mmol/L      Base Excess, Arterial -2.8 mmol/L      O2 Saturation Calculated 93.9 %      Barometric Pressure for Blood Gas 747.8 mmHg      Modality NRB     Flow Rate 15 lpm      Rate 22 Breaths/minute     CBC & Differential [[id number]]  (Abnormal) Collected: 07/09/23 2141    Specimen: Blood Updated: 07/09/23 2149    Narrative:      The following orders were created for panel order CBC & Differential.  Procedure                               Abnormality         Status                     ---------                               -----------         ------                     CBC Auto Differential[[id number]]        Abnormal            Final result                 Please view results for these tests on the individual orders.    CBC Auto Differential [[id number]]  (Abnormal) Collected: 07/09/23 2141    Specimen: Blood Updated: 07/09/23 2149     WBC 10.51 10*3/mm3      RBC 4.93 10*6/mm3      Hemoglobin 14.4 g/dL      Hematocrit 45.5 %      MCV 92.3 fL      MCH 29.2 pg      MCHC 31.6 g/dL      RDW 12.4 %      RDW-SD 42.0 fl      MPV 10.6 fL      Platelets 295 10*3/mm3      Neutrophil % 75.8 %      Lymphocyte % 17.2 %      Monocyte % 5.2 %      Eosinophil % 1.0 %      Basophil % 0.3 %      Immature Grans % 0.5 %       Neutrophils, Absolute 7.96 10*3/mm3      Lymphocytes, Absolute 1.81 10*3/mm3      Monocytes, Absolute 0.55 10*3/mm3      Eosinophils, Absolute 0.11 10*3/mm3      Basophils, Absolute 0.03 10*3/mm3      Immature Grans, Absolute 0.05 10*3/mm3      nRBC 0.0 /100 WBC           Imaging Results (Last 24 Hours)       Procedure Component Value Units Date/Time    XR Chest 1 View [[id number]] Collected: 07/09/23 2155     Updated: 07/09/23 2200    Narrative:      SINGLE VIEW OF THE CHEST     HISTORY: Possible aspiration     COMPARISON: 10/11/2018     FINDINGS:  Cardiac silhouette is unchanged. There is calcification of the aorta. No  pneumothorax or pleural effusion is seen. The patient has elevation of  the right diaphragm, as well as background chronic interstitial lung  disease with pulmonary fibrosis. Similar findings were present on prior  study from 10/17/2019, although I think there may be superimposed  infiltrates within both lungs.       Impression:      Background chronic interstitial lung disease and pulmonary fibrosis,  with suspected superimposed infiltrates,     This report was finalized on 7/9/2023 9:57 PM by Dr. Marisabel Soto M.D.             ECG 12 Lead Dyspnea: Patient Communication    Scan on 7/9/2023 2134 by [facility] [employer], [place]: ECG 12-LEAD         Author: -- Service: -- Author Type: --   Filed: Date of Service: Creation Time:   Status: (Other)   HEART RATE= 79  bpm  RR Interval= 759  ms  ID Interval=   ms  P Horizontal Axis=   deg  P Front Axis=   deg  QRSD Interval= 137  ms  QT Interval= 421  ms  QRS Axis= -100  deg  T Wave Axis= 18  deg  - ABNORMAL ECG -  Rhythm analysis indeterminate due to severe baseline artifact          Current Facility-Administered Medications:     aspirin EC tablet 81 mg, 81 mg, Oral, Daily, Alirio Mendez MD    donepezil (ARICEPT) tablet 10 mg, 10 mg, Oral, Daily, Alirio Mendez MD    furosemide (LASIX) tablet 20 mg, 20 mg, Oral, Daily, Alirio Mendez MD    gabapentin  (NEURONTIN) capsule 100 mg, 100 mg, Oral, TID, Rodríguez Mendez MD    ipratropium-albuterol (DUO-NEB) nebulizer solution 3 mL, 3 mL, Nebulization, 4x Daily - RT, Nilam Walker, APRN, 3 mL at 07/10/23 1110    latanoprost (XALATAN) 0.005 % ophthalmic solution 1 drop, 1 drop, Both Eyes, Nightly, Rodríguez Mendez MD    memantine (NAMENDA) tablet 10 mg, 10 mg, Oral, BID, Rodríguez Mendez MD    methylPREDNISolone sodium succinate (SOLU-Medrol) injection 40 mg, 40 mg, Intravenous, Q12H, Rodríguez Mendez MD    metoprolol succinate XL (TOPROL-XL) 24 hr tablet 50 mg, 50 mg, Oral, Daily, Rodríguez Mendez MD    pantoprazole (PROTONIX) EC tablet 40 mg, 40 mg, Oral, Daily, Rodríguez Mendez MD    piperacillin-tazobactam (ZOSYN) 3.375 g in iso-osmotic dextrose 50 ml (premix), 3.375 g, Intravenous, Q8H, Rodríguez Mendez MD    [COMPLETED] Insert Peripheral IV, , , Once **AND** sodium chloride 0.9 % flush 10 mL, 10 mL, Intravenous, PRN, Carmine Bourne MD    sodium chloride 7 % nebulizer solution nebulizer solution 4 mL, 4 mL, Nebulization, BID - RT, Nilam Walker, APRN     ASSESSMENT  Acute hypoxic hypercapnic respiratory failure  Acute exacerbation of COPD  Aspiration pneumonitis versus aspiration pneumonia  Severe dementia  Hypertension  Osteoarthritis  Gastroesophageal reflux disease    PLAN  Admit  Supplemental oxygen nebulizer  BiPAP if needed  Empiric IV antibiotics to cover aspiration  IV steroids  Pulmonary consult  Swallow eval  Adjust home medications  Stress ulcer DVT prophylaxis  Supportive care  DNR  Discussed with family nursing staff  Follow closely and further recommendation current hospital course    RODRÍGUEZ MENDEZ MD

## 2023-07-11 ENCOUNTER — APPOINTMENT (OUTPATIENT)
Dept: CARDIOLOGY | Facility: HOSPITAL | Age: 88
DRG: 177 | End: 2023-07-11
Payer: MEDICARE

## 2023-07-11 LAB
ALBUMIN SERPL-MCNC: 3.3 G/DL (ref 3.5–5.2)
ALBUMIN/GLOB SERPL: 0.9 G/DL
ALP SERPL-CCNC: 59 U/L (ref 39–117)
ALT SERPL W P-5'-P-CCNC: 9 U/L (ref 1–41)
ANION GAP SERPL CALCULATED.3IONS-SCNC: 8 MMOL/L (ref 5–15)
AORTIC DIMENSIONLESS INDEX: 0.6 (DI)
AST SERPL-CCNC: 13 U/L (ref 1–40)
BASOPHILS # BLD AUTO: 0.03 10*3/MM3 (ref 0–0.2)
BASOPHILS NFR BLD AUTO: 0.2 % (ref 0–1.5)
BH CV ECHO MEAS - ACS: 0.98 CM
BH CV ECHO MEAS - AO MAX PG: 12.3 MMHG
BH CV ECHO MEAS - AO MEAN PG: 5.7 MMHG
BH CV ECHO MEAS - AO ROOT DIAM: 3.5 CM
BH CV ECHO MEAS - AO V2 MAX: 175.5 CM/SEC
BH CV ECHO MEAS - AO V2 VTI: 38.4 CM
BH CV ECHO MEAS - AVA(I,D): 2.6 CM2
BH CV ECHO MEAS - EDV(CUBED): 71.3 ML
BH CV ECHO MEAS - EDV(MOD-SP2): 80 ML
BH CV ECHO MEAS - EDV(MOD-SP4): 90 ML
BH CV ECHO MEAS - EF(MOD-BP): 70.5 %
BH CV ECHO MEAS - EF(MOD-SP2): 71.3 %
BH CV ECHO MEAS - EF(MOD-SP4): 70 %
BH CV ECHO MEAS - ESV(CUBED): 25.2 ML
BH CV ECHO MEAS - ESV(MOD-SP2): 23 ML
BH CV ECHO MEAS - ESV(MOD-SP4): 27 ML
BH CV ECHO MEAS - FS: 29.3 %
BH CV ECHO MEAS - IVS/LVPW: 0.97 CM
BH CV ECHO MEAS - IVSD: 0.93 CM
BH CV ECHO MEAS - LAT PEAK E' VEL: 7 CM/SEC
BH CV ECHO MEAS - LV DIASTOLIC VOL/BSA (35-75): 48.6 CM2
BH CV ECHO MEAS - LV MASS(C)D: 124.5 GRAMS
BH CV ECHO MEAS - LV MAX PG: 4.1 MMHG
BH CV ECHO MEAS - LV MEAN PG: 1.75 MMHG
BH CV ECHO MEAS - LV SYSTOLIC VOL/BSA (12-30): 14.6 CM2
BH CV ECHO MEAS - LV V1 MAX: 101.5 CM/SEC
BH CV ECHO MEAS - LV V1 VTI: 24.9 CM
BH CV ECHO MEAS - LVIDD: 4.1 CM
BH CV ECHO MEAS - LVIDS: 2.9 CM
BH CV ECHO MEAS - LVOT AREA: 4.1 CM2
BH CV ECHO MEAS - LVOT DIAM: 2.28 CM
BH CV ECHO MEAS - LVPWD: 0.96 CM
BH CV ECHO MEAS - MED PEAK E' VEL: 4.7 CM/SEC
BH CV ECHO MEAS - MV A MAX VEL: 131.2 CM/SEC
BH CV ECHO MEAS - MV DEC SLOPE: 252.1 CM/SEC2
BH CV ECHO MEAS - MV DEC TIME: 0.31 MSEC
BH CV ECHO MEAS - MV E MAX VEL: 91.3 CM/SEC
BH CV ECHO MEAS - MV E/A: 0.7
BH CV ECHO MEAS - MV MAX PG: 7.3 MMHG
BH CV ECHO MEAS - MV MEAN PG: 2.17 MMHG
BH CV ECHO MEAS - MV P1/2T: 121.7 MSEC
BH CV ECHO MEAS - MV V2 VTI: 36.9 CM
BH CV ECHO MEAS - MVA(P1/2T): 1.81 CM2
BH CV ECHO MEAS - MVA(VTI): 2.8 CM2
BH CV ECHO MEAS - PA ACC TIME: 0.12 SEC
BH CV ECHO MEAS - PA V2 MAX: 106 CM/SEC
BH CV ECHO MEAS - PULM A REVS DUR: 0.12 SEC
BH CV ECHO MEAS - PULM A REVS VEL: 37.8 CM/SEC
BH CV ECHO MEAS - PULM DIAS VEL: 35.9 CM/SEC
BH CV ECHO MEAS - PULM S/D: 1.38
BH CV ECHO MEAS - PULM SYS VEL: 49.5 CM/SEC
BH CV ECHO MEAS - RAP SYSTOLE: 3 MMHG
BH CV ECHO MEAS - RV MAX PG: 2.8 MMHG
BH CV ECHO MEAS - RV V1 MAX: 82.9 CM/SEC
BH CV ECHO MEAS - RV V1 VTI: 20.4 CM
BH CV ECHO MEAS - SI(MOD-SP2): 30.8 ML/M2
BH CV ECHO MEAS - SI(MOD-SP4): 34 ML/M2
BH CV ECHO MEAS - SV(LVOT): 101.6 ML
BH CV ECHO MEAS - SV(MOD-SP2): 57 ML
BH CV ECHO MEAS - SV(MOD-SP4): 63 ML
BH CV ECHO MEAS - TAPSE (>1.6): 1.3 CM
BH CV ECHO MEASUREMENTS AVERAGE E/E' RATIO: 15.61
BH CV XLRA - RV BASE: 2.9 CM
BH CV XLRA - TDI S': 11.2 CM/SEC
BILIRUB SERPL-MCNC: 0.4 MG/DL (ref 0–1.2)
BUN SERPL-MCNC: 34 MG/DL (ref 8–23)
BUN/CREAT SERPL: 28.8 (ref 7–25)
CALCIUM SPEC-SCNC: 9.2 MG/DL (ref 8.6–10.5)
CHLORIDE SERPL-SCNC: 106 MMOL/L (ref 98–107)
CHOLEST SERPL-MCNC: 122 MG/DL (ref 0–200)
CO2 SERPL-SCNC: 24 MMOL/L (ref 22–29)
CREAT SERPL-MCNC: 1.18 MG/DL (ref 0.76–1.27)
DEPRECATED RDW RBC AUTO: 40.4 FL (ref 37–54)
EGFRCR SERPLBLD CKD-EPI 2021: 60.1 ML/MIN/1.73
EOSINOPHIL # BLD AUTO: 0 10*3/MM3 (ref 0–0.4)
EOSINOPHIL NFR BLD AUTO: 0 % (ref 0.3–6.2)
ERYTHROCYTE [DISTWIDTH] IN BLOOD BY AUTOMATED COUNT: 12.8 % (ref 12.3–15.4)
GLOBULIN UR ELPH-MCNC: 3.5 GM/DL
GLUCOSE SERPL-MCNC: 128 MG/DL (ref 65–99)
HBA1C MFR BLD: 5.4 % (ref 4.8–5.6)
HCT VFR BLD AUTO: 33.5 % (ref 37.5–51)
HDLC SERPL-MCNC: 53 MG/DL (ref 40–60)
HGB BLD-MCNC: 11.4 G/DL (ref 13–17.7)
IMM GRANULOCYTES # BLD AUTO: 0.08 10*3/MM3 (ref 0–0.05)
IMM GRANULOCYTES NFR BLD AUTO: 0.5 % (ref 0–0.5)
LDLC SERPL CALC-MCNC: 58 MG/DL (ref 0–100)
LDLC/HDLC SERPL: 1.12 {RATIO}
LYMPHOCYTES # BLD AUTO: 0.61 10*3/MM3 (ref 0.7–3.1)
LYMPHOCYTES NFR BLD AUTO: 4 % (ref 19.6–45.3)
MCH RBC QN AUTO: 30.2 PG (ref 26.6–33)
MCHC RBC AUTO-ENTMCNC: 34 G/DL (ref 31.5–35.7)
MCV RBC AUTO: 88.6 FL (ref 79–97)
MONOCYTES # BLD AUTO: 0.35 10*3/MM3 (ref 0.1–0.9)
MONOCYTES NFR BLD AUTO: 2.3 % (ref 5–12)
NEUTROPHILS NFR BLD AUTO: 14.21 10*3/MM3 (ref 1.7–7)
NEUTROPHILS NFR BLD AUTO: 93 % (ref 42.7–76)
NRBC BLD AUTO-RTO: 0 /100 WBC (ref 0–0.2)
PLATELET # BLD AUTO: 242 10*3/MM3 (ref 140–450)
PMV BLD AUTO: 10.9 FL (ref 6–12)
POTASSIUM SERPL-SCNC: 4.2 MMOL/L (ref 3.5–5.2)
PROT SERPL-MCNC: 6.8 G/DL (ref 6–8.5)
RBC # BLD AUTO: 3.78 10*6/MM3 (ref 4.14–5.8)
SINUS: 3.1 CM
SODIUM SERPL-SCNC: 138 MMOL/L (ref 136–145)
STJ: 2.21 CM
TRIGL SERPL-MCNC: 48 MG/DL (ref 0–150)
TSH SERPL DL<=0.05 MIU/L-ACNC: 1.84 UIU/ML (ref 0.27–4.2)
VLDLC SERPL-MCNC: 11 MG/DL (ref 5–40)
WBC NRBC COR # BLD: 15.28 10*3/MM3 (ref 3.4–10.8)

## 2023-07-11 PROCEDURE — 25510000001 PERFLUTREN (DEFINITY) 8.476 MG IN SODIUM CHLORIDE (PF) 0.9 % 10 ML INJECTION: Performed by: HOSPITALIST

## 2023-07-11 PROCEDURE — 80061 LIPID PANEL: CPT | Performed by: HOSPITALIST

## 2023-07-11 PROCEDURE — 94761 N-INVAS EAR/PLS OXIMETRY MLT: CPT

## 2023-07-11 PROCEDURE — 94640 AIRWAY INHALATION TREATMENT: CPT

## 2023-07-11 PROCEDURE — 83036 HEMOGLOBIN GLYCOSYLATED A1C: CPT | Performed by: HOSPITALIST

## 2023-07-11 PROCEDURE — 94667 MNPJ CHEST WALL 1ST: CPT

## 2023-07-11 PROCEDURE — 94664 DEMO&/EVAL PT USE INHALER: CPT

## 2023-07-11 PROCEDURE — 85025 COMPLETE CBC W/AUTO DIFF WBC: CPT | Performed by: HOSPITALIST

## 2023-07-11 PROCEDURE — 94760 N-INVAS EAR/PLS OXIMETRY 1: CPT

## 2023-07-11 PROCEDURE — 97110 THERAPEUTIC EXERCISES: CPT

## 2023-07-11 PROCEDURE — 80053 COMPREHEN METABOLIC PANEL: CPT | Performed by: HOSPITALIST

## 2023-07-11 PROCEDURE — 99221 1ST HOSP IP/OBS SF/LOW 40: CPT | Performed by: INTERNAL MEDICINE

## 2023-07-11 PROCEDURE — 25010000002 METHYLPREDNISOLONE PER 40 MG: Performed by: HOSPITALIST

## 2023-07-11 PROCEDURE — 25010000002 PIPERACILLIN SOD-TAZOBACTAM PER 1 G: Performed by: HOSPITALIST

## 2023-07-11 PROCEDURE — 97166 OT EVAL MOD COMPLEX 45 MIN: CPT

## 2023-07-11 PROCEDURE — 94799 UNLISTED PULMONARY SVC/PX: CPT

## 2023-07-11 PROCEDURE — 93306 TTE W/DOPPLER COMPLETE: CPT

## 2023-07-11 PROCEDURE — 93306 TTE W/DOPPLER COMPLETE: CPT | Performed by: INTERNAL MEDICINE

## 2023-07-11 PROCEDURE — 36415 COLL VENOUS BLD VENIPUNCTURE: CPT | Performed by: HOSPITALIST

## 2023-07-11 PROCEDURE — 97162 PT EVAL MOD COMPLEX 30 MIN: CPT

## 2023-07-11 PROCEDURE — 97535 SELF CARE MNGMENT TRAINING: CPT

## 2023-07-11 PROCEDURE — 84443 ASSAY THYROID STIM HORMONE: CPT | Performed by: HOSPITALIST

## 2023-07-11 RX ORDER — METHYLPREDNISOLONE SODIUM SUCCINATE 40 MG/ML
20 INJECTION, POWDER, LYOPHILIZED, FOR SOLUTION INTRAMUSCULAR; INTRAVENOUS EVERY 12 HOURS
Status: DISCONTINUED | OUTPATIENT
Start: 2023-07-12 | End: 2023-07-12

## 2023-07-11 RX ADMIN — Medication 4 ML: at 19:28

## 2023-07-11 RX ADMIN — PIPERACILLIN SODIUM AND TAZOBACTAM SODIUM 3.38 G: 3; .375 INJECTION, SOLUTION INTRAVENOUS at 16:07

## 2023-07-11 RX ADMIN — IPRATROPIUM BROMIDE AND ALBUTEROL SULFATE 3 ML: .5; 3 SOLUTION RESPIRATORY (INHALATION) at 11:26

## 2023-07-11 RX ADMIN — Medication 4 ML: at 08:26

## 2023-07-11 RX ADMIN — PIPERACILLIN SODIUM AND TAZOBACTAM SODIUM 3.38 G: 3; .375 INJECTION, SOLUTION INTRAVENOUS at 06:46

## 2023-07-11 RX ADMIN — IPRATROPIUM BROMIDE AND ALBUTEROL SULFATE 3 ML: .5; 3 SOLUTION RESPIRATORY (INHALATION) at 19:19

## 2023-07-11 RX ADMIN — BUDESONIDE AND FORMOTEROL FUMARATE DIHYDRATE 2 PUFF: 160; 4.5 AEROSOL RESPIRATORY (INHALATION) at 08:33

## 2023-07-11 RX ADMIN — PERFLUTREN 3 ML: 6.52 INJECTION, SUSPENSION INTRAVENOUS at 15:22

## 2023-07-11 RX ADMIN — IPRATROPIUM BROMIDE AND ALBUTEROL SULFATE 3 ML: .5; 3 SOLUTION RESPIRATORY (INHALATION) at 08:21

## 2023-07-11 RX ADMIN — BUDESONIDE AND FORMOTEROL FUMARATE DIHYDRATE 2 PUFF: 160; 4.5 AEROSOL RESPIRATORY (INHALATION) at 19:45

## 2023-07-11 RX ADMIN — LATANOPROST 1 DROP: 50 SOLUTION OPHTHALMIC at 21:31

## 2023-07-11 RX ADMIN — METHYLPREDNISOLONE SODIUM SUCCINATE 40 MG: 40 INJECTION, POWDER, FOR SOLUTION INTRAMUSCULAR; INTRAVENOUS at 14:00

## 2023-07-11 RX ADMIN — METHYLPREDNISOLONE SODIUM SUCCINATE 40 MG: 40 INJECTION, POWDER, FOR SOLUTION INTRAMUSCULAR; INTRAVENOUS at 02:06

## 2023-07-11 RX ADMIN — IPRATROPIUM BROMIDE AND ALBUTEROL SULFATE 3 ML: .5; 3 SOLUTION RESPIRATORY (INHALATION) at 15:29

## 2023-07-11 NOTE — CASE MANAGEMENT/SOCIAL WORK
Discharge Planning Assessment  [facility]     Patient Name: Brandon Yo  MRN: [id number]  Today's Date: 7/11/2023    Admit Date: 7/9/2023    Plan: Return home with Devora MORGAN (referral pending)   Discharge Needs Assessment       Row Name 07/11/23 1231       Living Environment    People in Home spouse    Name(s) of People in Home Milo Sims    Current Living Arrangements home    Potentially Unsafe Housing Conditions none    Primary Care Provided by spouse/significant other    Provides Primary Care For no one, unable/limited ability to care for self    Family Caregiver if Needed spouse    Family Caregiver Names Wife Bethany [phone]    Quality of Family Relationships helpful    Able to Return to Prior Arrangements yes       Resource/Environmental Concerns    Resource/Environmental Concerns none    Transportation Concerns none       Food Insecurity    Within the past 12 months, you worried that your food would run out before you got the money to buy more. Never true    Within the past 12 months, the food you bought just didn't last and you didn't have money to get more. Never true       Transition Planning    Patient/Family Anticipates Transition to home with family    Patient/Family Anticipated Services at Transition home health care    Transportation Anticipated family or friend will provide       Discharge Needs Assessment    Readmission Within the Last 30 Days no previous admission in last 30 days    Equipment Currently Used at Home walker, rolling;oxygen;nebulizer  Has Oxygen available but has not been using it    Concerns to be Addressed basic needs    Anticipated Changes Related to Illness none    Equipment Needed After Discharge none    Discharge Facility/Level of Care Needs home with home health    Provided Post Acute Provider List? Yes    Post Acute Provider List Home Health    Delivered To Support Person    Support Person Wife    Method of Delivery In person                   Discharge  Plan       Row Name 07/11/23 1241       Plan    Plan Return home with sydni and [employer]  (referral pending)    Patient/Family in Agreement with Plan yes    Plan Comments Spoke with patient, wife Bethany [phone] and daughter Eda [phone] at bedside.  Patient did not answer questions.  He uses a walker, has O2 available but has not been using it, has a nebulizer.  He has used HH in [place] and has been to SNF in [place].  PCp is Dr. Mike Covarrubias and pharmacy is [facility] in [facility].  Wife is interested in HH following at [place] - given CMS ratings from medicare.gov.  They would like to use [facility] - spoke with Kylah and she will evaluate.  No PT eval yet.  CCP will follow.  Carolynn NDIAYE                  Continued Care and Services - Admitted Since 7/9/2023       Home Medical Care       Service Provider Request Status Selected Services Address Phone Fax Patient Preferred    [facility] Accepted N/A [id number] [address]ALLON [address] [place] [address] [phone] --                  Expected Discharge Date and Time       Expected Discharge Date Expected Discharge Time    Jul 14, 2023            Demographic Summary       Row Name 07/11/23 1229       General Information    Admission Type inpatient    Arrived From home    Referral Source admission list    Reason for Consult discharge planning    Preferred Language English                   Functional Status       Row Name 07/11/23 1230       Functional Status    Usual Activity Tolerance moderate    Current Activity Tolerance moderate       Assessment of Health Literacy    How often do you have someone help you read hospital materials? Always       Functional Status, IADL    Medications assistive person    Meal Preparation completely dependent  Wife    Housekeeping completely dependent    Laundry completely dependent    Shopping completely dependent       Mental Status    General Appearance WDL WDL       Mental Status  Summary    Recent Changes in Mental Status/Cognitive Functioning unable to assess  Did not answer any questions                          Becky S. [place], RN

## 2023-07-11 NOTE — PLAN OF CARE
Goal Outcome Evaluation:  Plan of Care Reviewed With: patient           Outcome Evaluation: Attempted to see pt this am.  GI consult had not yet been completed.  Will f/u results of consult/studies.

## 2023-07-11 NOTE — CONSULTS
Chief Complaint   Patient presents with    Aspiration       Brandon Yo is a 87 y.o. male who presents with dyspnea    HPI  Mr. Yee is an 87 yoM w h/o HTN, COPD, dementia, aspiration who presents with dyspnea.    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  He returns for similar issue.  He was eating and family witnessed him aspirate.  EMS suctioned 100 cc gastric contents from oropharynx.  He was noted to have SpO2 91% on nonrebreather.  He was started on Zosyn.  High-sensitivity troponin 29, creatinine 1.04, glucose 134, lactate 1.6, procalcitonin 0.03, WBC 10.51.  CXR showed chronic interstitial lung disease with pulmonary fibrosis with suspected superimposed infiltrates in bilateral lungs.  Speech was consulted and note delayed wet cough after PO trials and endorses pressure at sternal notch and recommended NPO with ice chips and GI consultation.   Patient is on no O2 this afternoon with SpO2 93-94 in room.  He denies any current sensation of dyspnea.  No chest pain.  Denies odynophagia.  He reports issues mostly with swallowing solids.  Reports dysphagia is intermittent and can go some time between episodes.  He thinks he had EGD remotely but is not sure.    Past Medical History:   Diagnosis Date    Acid reflux     Aspiration into respiratory tract     Chronic right shoulder pain     H/O cervical discectomy     20 years prior    Hypertension        Past Surgical History:   Procedure Laterality Date    BACK SURGERY           Current Facility-Administered Medications:     aspirin EC tablet 81 mg, 81 mg, Oral, Daily, Alirio Mendez MD    budesonide-formoterol (SYMBICORT) 160-4.5 MCG/ACT inhaler 2 puff, 2 puff, Inhalation, BID - RT, Alirio Mendez MD, 2 puff at 07/11/23 0833    donepezil (ARICEPT) tablet 10 mg, 10 mg, Oral,  Daily, Alirio Mendez MD    furosemide (LASIX) tablet 20 mg, 20 mg, Oral, Daily, Alirio Mendez MD    gabapentin (NEURONTIN) capsule 100 mg, 100 mg, Oral, TID, Alirio Mendez MD    guaiFENesin (MUCINEX) 12 hr tablet 600 mg, 600 mg, Oral, Q12H, Alirio Mendez MD    ipratropium-albuterol (DUO-NEB) nebulizer solution 3 mL, 3 mL, Nebulization, 4x Daily - RT, Nilam Walker APRN, 3 mL at 07/11/23 1126    latanoprost (XALATAN) 0.005 % ophthalmic solution 1 drop, 1 drop, Both Eyes, Nightly, Alirio Mendez MD, 1 drop at 07/10/23 2300    memantine (NAMENDA) tablet 10 mg, 10 mg, Oral, BID, Alirio Mendez MD    methylPREDNISolone sodium succinate (SOLU-Medrol) injection 40 mg, 40 mg, Intravenous, Q12H, Alirio Mendez MD, 40 mg at 07/11/23 0206    metoprolol succinate XL (TOPROL-XL) 24 hr tablet 50 mg, 50 mg, Oral, Daily, Alirio Mendez MD    pantoprazole (PROTONIX) EC tablet 40 mg, 40 mg, Oral, BID AC, Alirio Mendez MD    piperacillin-tazobactam (ZOSYN) 3.375 g in iso-osmotic dextrose 50 ml (premix), 3.375 g, Intravenous, Q8H, Alirio Mendez MD, 3.375 g at 07/11/23 0646    [COMPLETED] Insert Peripheral IV, , , Once **AND** sodium chloride 0.9 % flush 10 mL, 10 mL, Intravenous, PRN, Carmine Bourne MD    sodium chloride 7 % nebulizer solution nebulizer solution 4 mL, 4 mL, Nebulization, BID - RT, Nilam Walker APRN, 4 mL at 07/11/23 0826    No Known Allergies    Social History     Socioeconomic History    Marital status:    Tobacco Use    Smoking status: Former    Smokeless tobacco: Never   Vaping Use    Vaping Use: Never used   Substance and Sexual Activity    Alcohol use: Yes     Alcohol/week: 7.0 standard drinks     Types: 7 Shots of liquor per week     Comment: occ    Drug use: No       History reviewed. No pertinent family history.    Review of Systems   Constitutional:  Negative for chills and fever.   Respiratory:  Negative for cough and shortness of breath.    Gastrointestinal:  Negative for abdominal distention,  abdominal pain, nausea and vomiting.   Skin:  Negative for color change and rash.   All other systems reviewed and are negative.    Vitals:    07/11/23 1136   BP:    Pulse: 82   Resp: 18   Temp:    SpO2: 100%       Physical Exam   General: Patient awake, alert and cooperative   Eyes: Normal lids and lashes, no scleral icterus   Neck: Supple, normal ROM   Skin: Warm and dry, not jaundiced   Cardiovascular: Regular rate,  well-perfused extremities   Pulm: Equal expansion bilaterally, no increased WOB; not on O2 with SpO2 93-94   Abdomen: Soft, nontender, nondistended;    Extremities: No rash or edema              Neuro: Awake, interactive   Psychiatric: Normal mood and behavior; memory intact    XR Chest 1 View    Result Date: 7/9/2023  Background chronic interstitial lung disease and pulmonary fibrosis, with suspected superimposed infiltrates,  This report was finalized on 7/9/2023 9:57 PM by Dr. Marisabel Soto M.D.       Impression:   Aspiration PNA  Acute Hypoxic Respiratory Failure (improving)  COPD   Dementia  Dysphagia    Plan:  - Speech therapy following  - Will get esophagram with barium tablet  - Discussed risks/benefits of potential EGD if needed pending esophagram      Hitesh Villalpando MD

## 2023-07-11 NOTE — PLAN OF CARE
Goal Outcome Evaluation:  Plan of Care Reviewed With: patient, spouse, daughter           Outcome Evaluation: Pt seen for OT eval after admission for acute resp failur and hypoxia after episode of aspiration. Of note, pt recently d/c'd to home from SNF 2/2 aspiration pna. Pt has h/o dementia and wife provides assist prn as well as 24 hr supervision. Pt able to complete LB ADLs alternating sit<>stand from EOB with Temi this date and ambulate short distance in room with CGA x 1 +1. OT discussed d/c recs of HH and pts family agreeable. OT will con't to follow for stated goals and recommend d/c to home with HHOT and 24 hr supervision/assist.      Anticipated Discharge Disposition (OT): home with 24/7 care, home with assist, home with home health

## 2023-07-11 NOTE — PROGRESS NOTES
[facility] INPATIENT PROGRESS NOTE         [address]    2023      PATIENT IDENTIFICATION:  Name: Brandon Yo ADMIT: 2023   : 1935  PCP: Mike Covarrubias MD    MRN: [id number] LOS: 2 days   AGE/SEX: 87 y.o. male  ROOM: [facility]                     LOS 2    Reason for visit: respiratory failure       SUBJECTIVE:      Sleeping at time of visit.  Easily arousable to voice calm and cooperative.  Denies productive cough, chest pain or worsening shortness of breath.  Diminished breath sounds on auscultation.  No wheezing or rhonchi.  I am seeing the patient for the first time today.  All patient problems are new to me.      Objective   OBJECTIVE:    Vital Sign Min/Max for last 24 hours  Temp  Min: 97.9 °F (36.6 °C)  Max: 98.9 °F (37.2 °C)   BP  Min: 118/60  Max: 126/70   Pulse  Min: 76  Max: 96   Resp  Min: 18  Max: 18   SpO2  Min: 92 %  Max: 100 %   No data recorded   No data recorded                         Body mass index is 22.92 kg/m².    Intake/Output Summary (Last 24 hours) at 2023 1256  Last data filed at 2023 0946  Gross per 24 hour   Intake 0 ml   Output 300 ml   Net -300 ml         Exam:  GEN:  No distress, appears stated age  EYES:   PERRL, anicteric sclerae  ENT:    External ears/nose normal, OP clear  NECK:  No adenopathy, midline trachea  LUNGS: Normal chest on inspection, palpation and auscultation  CV:  Normal S1S2, without murmur  ABD:  Nontender, nondistended, no hepatosplenomegaly, +BS  EXT:  No edema.  No cyanosis or clubbing.  No mottling and normal cap refill.    Assessment     Scheduled meds:  aspirin, 81 mg, Oral, Daily  budesonide-formoterol, 2 puff, Inhalation, BID - RT  donepezil, 10 mg, Oral, Daily  furosemide, 20 mg, Oral, Daily  gabapentin, 100 mg, Oral, TID  guaiFENesin, 600 mg, Oral, Q12H  ipratropium-albuterol, 3 mL, Nebulization, 4x Daily - RT  latanoprost, 1 drop, Both Eyes, Nightly  memantine, 10 mg, Oral, BID  methylPREDNISolone sodium  succinate, 40 mg, Intravenous, Q12H  metoprolol succinate XL, 50 mg, Oral, Daily  pantoprazole, 40 mg, Oral, BID AC  piperacillin-tazobactam, 3.375 g, Intravenous, Q8H  sodium chloride, 4 mL, Nebulization, BID - RT      IV meds:                         Data Review:  Results from last 7 days   Lab Units 07/11/23  0638 07/09/23  2141   SODIUM mmol/L 138 139   POTASSIUM mmol/L 4.2 4.4   CHLORIDE mmol/L 106 103   CO2 mmol/L 24.0 22.3   BUN mg/dL 34* 20   CREATININE mg/dL 1.18 1.04   GLUCOSE mg/dL 128* 134*   CALCIUM mg/dL 9.2 9.3         Estimated Creatinine Clearance: 44.7 mL/min (by C-G formula based on SCr of 1.18 mg/dL).  Results from last 7 days   Lab Units 07/11/23  0638 07/09/23  2141   WBC 10*3/mm3 15.28* 10.51   HEMOGLOBIN g/dL 11.4* 14.4   PLATELETS 10*3/mm3 242 295     Results from last 7 days   Lab Units 07/09/23  2141   INR  1.04     Results from last 7 days   Lab Units 07/11/23  0638 07/09/23  2141   ALT (SGPT) U/L 9 16   AST (SGOT) U/L 13 17     Results from last 7 days   Lab Units 07/09/23  2153   PH, ARTERIAL pH units 7.276*   PO2 ART mm Hg 81.0   PCO2, ARTERIAL mm Hg 53.8*   HCO3 ART mmol/L 25.0     Results from last 7 days   Lab Units 07/09/23  2141   PROCALCITONIN ng/mL 0.03   LACTATE mmol/L 1.6         Hemoglobin A1C   Date/Time Value Ref Range Status   07/11/2023 0638 5.40 4.80 - 5.60 % Final     CXR 7/9 reviewed          Microbiology reviewed              Active Hospital Problems    Diagnosis  POA    **Acute respiratory failure with hypoxia [J96.01]  Yes      Resolved Hospital Problems   No resolved problems to display.         ASSESSMENT:  Acute respiratory failure with hypoxia  Acute respiratory failure with hypercapnia  Aspiration pneumonitis versus pneumonia  ILD  Dysphagia  COPD/emphysema  Chronically elevated right hemidiaphragm  Hypertension  Dementia      PLAN:  Encourage pulmonary toilet.  Bronchodilators and saline nebulized to help with clearance.  Wean oxygen as able.    Antibiotic for  aspiration PNA.        David Mello MD  Pulmonary and Critical Care Medicine  [facility], [facility]  7/11/2023    12:56 EDT

## 2023-07-11 NOTE — PROGRESS NOTES
"Daily progress note    Primary care physician  Dr. PARIKH    Chief complaint  Doing better with no new complaints and family at bedside.    History of present illness  87-year-old white male with history of COPD dementia hypertension congestive heart failure and gastroesophageal reflux disease brought to the emergency room by the family with respiratory distress shortness of breath and found to be hypoxic.  Patient evaluated in ER and work-up revealed acute hypoxic respiratory failure with COPD exhibition and aspiration pneumonitis admitted for management.  Patient is demented unable to give detailed history most of the history obtained from the chart family nursing staff and old record.  Patient is DNR per his wishes.     REVIEW OF SYSTEMS  All systems reviewed and negative except for those discussed in HPI.      PHYSICAL EXAM  Blood pressure 139/64, pulse 77, temperature 98.4 °F (36.9 °C), temperature source Oral, resp. rate 18, height 175.3 cm (69\"), weight 70.4 kg (155 lb 3.3 oz), SpO2 100 %.    GENERAL: Awake and alert in mild distress and pleasantly confused  HENT: NCAT: nares patent: Neck supple  EYES: no scleral icterus  CV: regular rhythm, tachycardic   RESPIRATORY: Moderate respiratory distress with rhonchi in all lung fields  ABDOMEN: soft, NTND: Bowel sounds positive  MUSCULOSKELETAL: no deformity  NEURO: alert with nonfocal neuro exam  PSYCH:  calm, cooperative  SKIN: warm, dry     LAB RESULTS  Lab Results (last 24 hours)       Procedure Component Value Units Date/Time    TSH [[id number]]  (Normal) Collected: 07/11/23 0638    Specimen: Blood Updated: 07/11/23 0723     TSH 1.840 uIU/mL     Comprehensive Metabolic Panel [[id number]]  (Abnormal) Collected: 07/11/23 0638    Specimen: Blood Updated: 07/11/23 0721     Glucose 128 mg/dL      BUN 34 mg/dL      Creatinine 1.18 mg/dL      Sodium 138 mmol/L      Potassium 4.2 mmol/L      Chloride 106 mmol/L      CO2 24.0 mmol/L      Calcium 9.2 mg/dL      Total " Protein 6.8 g/dL      Albumin 3.3 g/dL      ALT (SGPT) 9 U/L      AST (SGOT) 13 U/L      Alkaline Phosphatase 59 U/L      Total Bilirubin 0.4 mg/dL      Globulin 3.5 gm/dL      A/G Ratio 0.9 g/dL      BUN/Creatinine Ratio 28.8     Anion Gap 8.0 mmol/L      eGFR 60.1 mL/min/1.73     Narrative:      GFR Normal >60  Chronic Kidney Disease <60  Kidney Failure <15    The GFR formula is only valid for adults with stable renal function between ages 18 and 70.    Lipid Panel [[id number]] Collected: 07/11/23 0638    Specimen: Blood Updated: 07/11/23 0719     Total Cholesterol 122 mg/dL      Triglycerides 48 mg/dL      HDL Cholesterol 53 mg/dL      LDL Cholesterol  58 mg/dL      VLDL Cholesterol 11 mg/dL      LDL/HDL Ratio 1.12    Narrative:      Cholesterol Reference Ranges  (U.S. Department of Health and Human Services ATP III Classifications)    Desirable          <200 mg/dL  Borderline High    200-239 mg/dL  High Risk          >240 mg/dL      Triglyceride Reference Ranges  (U.S. Department of Health and Human Services ATP III Classifications)    Normal           <150 mg/dL  Borderline High  150-199 mg/dL  High             200-499 mg/dL  Very High        >500 mg/dL    HDL Reference Ranges  (U.S. Department of Health and Human Services ATP III Classifications)    Low     <40 mg/dl (major risk factor for CHD)  High    >60 mg/dl ('negative' risk factor for CHD)        LDL Reference Ranges  (U.S. Department of Health and Human Services ATP III Classifications)    Optimal          <100 mg/dL  Near Optimal     100-129 mg/dL  Borderline High  130-159 mg/dL  High             160-189 mg/dL  Very High        >189 mg/dL    Hemoglobin A1c [[id number]]  (Normal) Collected: 07/11/23 0638    Specimen: Blood Updated: 07/11/23 0710     Hemoglobin A1C 5.40 %     Narrative:      Hemoglobin A1C Ranges:    Increased Risk for Diabetes  5.7% to 6.4%  Diabetes                     >= 6.5%  Diabetic Goal                < 7.0%    CBC & Differential  [[id number]]  (Abnormal) Collected: 07/11/23 0638    Specimen: Blood Updated: 07/11/23 0703    Narrative:      The following orders were created for panel order CBC & Differential.  Procedure                               Abnormality         Status                     ---------                               -----------         ------                     CBC Auto Differential[[id number]]        Abnormal            Final result                 Please view results for these tests on the individual orders.    CBC Auto Differential [[id number]]  (Abnormal) Collected: 07/11/23 0638    Specimen: Blood Updated: 07/11/23 0703     WBC 15.28 10*3/mm3      RBC 3.78 10*6/mm3      Hemoglobin 11.4 g/dL      Hematocrit 33.5 %      MCV 88.6 fL      MCH 30.2 pg      MCHC 34.0 g/dL      RDW 12.8 %      RDW-SD 40.4 fl      MPV 10.9 fL      Platelets 242 10*3/mm3      Neutrophil % 93.0 %      Lymphocyte % 4.0 %      Monocyte % 2.3 %      Eosinophil % 0.0 %      Basophil % 0.2 %      Immature Grans % 0.5 %      Neutrophils, Absolute 14.21 10*3/mm3      Lymphocytes, Absolute 0.61 10*3/mm3      Monocytes, Absolute 0.35 10*3/mm3      Eosinophils, Absolute 0.00 10*3/mm3      Basophils, Absolute 0.03 10*3/mm3      Immature Grans, Absolute 0.08 10*3/mm3      nRBC 0.0 /100 WBC     Blood Culture - Blood, Arm, Right [[id number]]  (Normal) Collected: 07/09/23 2249    Specimen: Blood from Arm, Right Updated: 07/10/23 2300     Blood Culture No growth at 24 hours    Blood Culture - Blood, Arm, Right [[id number]]  (Normal) Collected: 07/09/23 2156    Specimen: Blood from Arm, Right Updated: 07/10/23 2200     Blood Culture No growth at 24 hours    Narrative:      Less than seven (7) mL's of blood was collected.  Insufficient quantity may yield false negative results.          Imaging Results (Last 24 Hours)       ** No results found for the last 24 hours. **          ECG 12 Lead Dyspnea: Patient Communication    [facility] 7[facility]/2023 [facility]  ECG 12-LEAD         Author: -- Service: -- Author Type: --   Filed: Date of Service: Creation Time:   Status: (Other)   HEART RATE= 79  bpm  RR Interval= 759  ms  MA Interval=   ms  P Horizontal Axis=   deg  P Front Axis=   deg  QRSD Interval= 137  ms  QT Interval= 421  ms  QRS Axis= -100  deg  T Wave Axis= 18  deg  - ABNORMAL ECG -  Rhythm analysis indeterminate due to severe baseline artifact          Current Facility-Administered Medications:     aspirin EC tablet 81 mg, 81 mg, Oral, Daily, Alirio Mendez MD    budesonide-formoterol (SYMBICORT) 160-4.5 MCG/ACT inhaler 2 puff, 2 puff, Inhalation, BID - RT, Alirio Mednez MD, 2 puff at 07/11/23 0833    donepezil (ARICEPT) tablet 10 mg, 10 mg, Oral, Daily, Alirio Mendez MD    furosemide (LASIX) tablet 20 mg, 20 mg, Oral, Daily, Alirio Mendez MD    gabapentin (NEURONTIN) capsule 100 mg, 100 mg, Oral, TID, Alirio Mendez MD    guaiFENesin (MUCINEX) 12 hr tablet 600 mg, 600 mg, Oral, Q12H, Alirio Mendez MD    ipratropium-albuterol (DUO-NEB) nebulizer solution 3 mL, 3 mL, Nebulization, 4x Daily - RT, Nilam Walker, APRN, 3 mL at 07/11/23 1126    latanoprost (XALATAN) 0.005 % ophthalmic solution 1 drop, 1 drop, Both Eyes, Nightly, Alirio Mendez MD, 1 drop at 07/10/23 2300    memantine (NAMENDA) tablet 10 mg, 10 mg, Oral, BID, Alirio Mendez MD    methylPREDNISolone sodium succinate (SOLU-Medrol) injection 40 mg, 40 mg, Intravenous, Q12H, Alirio Mendez MD, 40 mg at 07/11/23 1400    metoprolol succinate XL (TOPROL-XL) 24 hr tablet 50 mg, 50 mg, Oral, Daily, Alirio Mendez MD    pantoprazole (PROTONIX) EC tablet 40 mg, 40 mg, Oral, BID AC, Alirio Mendez MD    piperacillin-tazobactam (ZOSYN) 3.375 g in iso-osmotic dextrose 50 ml (premix), 3.375 g, Intravenous, Q8H, Alirio Mendez MD, 3.375 g at 07/11/23 0646    [COMPLETED] Insert Peripheral IV, , , Once **AND** sodium chloride 0.9 % flush 10 mL, 10 mL, Intravenous, PRN, Carmine Bourne MD    sodium chloride 7 % nebulizer  solution nebulizer solution 4 mL, 4 mL, Nebulization, BID - RT, Nilam Walker, APRN, 4 mL at 07/11/23 0826     ASSESSMENT  Acute hypoxic hypercapnic respiratory failure  Acute exacerbation of COPD  Aspiration pneumonitis   Dysphagia  Severe dementia  Hypertension  Osteoarthritis  Gastroesophageal reflux disease    PLAN  CPM  Supplemental oxygen nebulizer  BiPAP if needed  Continue IV antibiotics   Continue IV steroids  Pulmonary consult appreciated  Video swallow study in a.m.  GI consult  Adjust home medications  Stress ulcer DVT prophylaxis  Supportive care  PT OT  DNR  Discussed with family and nursing staff  Follow closely and further recommendation current hospital course    RODRÍGUEZ ADAMS MD    Copied text in this note has been reviewed and is accurate as of 07/11/23

## 2023-07-11 NOTE — PLAN OF CARE
Goal Outcome Evaluation:  Plan of Care Reviewed With: (P) patient, spouse, daughter           Outcome Evaluation: (P) Pt agreed to PT eval this am. Pt admitted to [facility] for acute respiratory failure with hypoxia. Pt aspirated at home and had initial c/o SOB. Pt hx of COPD, HTN, dementia. Pt has L foot drop. Pt reported living with his wife with 1 step to enter. Wife assists with all ADLs. Pt uses a rwx at home and has a w/c when needed. Today, pt required no pain. He completed bed mobility with min assist. He stood with min assist x2 and rwx. Pt able to ambulate 10ft to chair with CGA/rwx. No LOB or unsteadiness noted. Pt demonstrates BLE weakness, decreased activity tolerance, and decreased functional mobility. Pt could benefit from skilled PT to increase mobility and strength. Recommend home with HH at [place].      Anticipated Discharge Disposition (PT): (P) home with home health

## 2023-07-11 NOTE — THERAPY EVALUATION
Patient Name: Brandon Yo  : 1935    MRN: [id number]                              Today's Date: 2023       Admit Date: 2023    Visit Dx:     ICD-10-CM ICD-9-CM   1. Acute respiratory failure with hypoxia  J96.01 518.81   2. Aspiration pneumonitis  J69.0 507.0   3. COPD exacerbation  J44.1 491.21   4. Do not resuscitate  Z66 V49.86     Patient Active Problem List   Diagnosis    Acute respiratory failure with hypoxia     Past Medical History:   Diagnosis Date    Acid reflux     Aspiration into respiratory tract     Chronic right shoulder pain     H/O cervical discectomy     20 years prior    Hypertension      Past Surgical History:   Procedure Laterality Date    BACK SURGERY        General Information       Row Name 23 1502          OT Time and Intention    Document Type evaluation  -CE     Mode of Treatment occupational therapy  -CE       Row Name 23 1502          General Information    Patient Profile Reviewed yes  -CE     Prior Level of Function min assist:;mod assist:;all household mobility;transfer;ADL's  wife assists and provides  supervision  -CE     Existing Precautions/Restrictions fall  -CE     Barriers to Rehab none identified  -CE       Row Name 23 1502          Living Environment    People in Home spouse  -CE       Row Name 23 1502          Home Main Entrance    Number of Stairs, Main Entrance one  -CE       Row Name 23 1502          Stairs Within Home, Primary    Number of Stairs, Within Home, Primary none  -CE     Stairs Comment, Within Home, Primary Pt has 1 level home and also owns FWW, w/c, grab bars, SC and BSC.  -CE       Row Name 23 1502          Cognition    Orientation Status (Cognition) oriented to;person;place  -CE       Row Name 23 1502          Safety Issues, Functional Mobility    Safety Issues Affecting Function (Mobility) insight into deficits/self-awareness;safety precautions follow-through/compliance;sequencing  abilities;safety precaution awareness  -CE     Impairments Affecting Function (Mobility) balance;endurance/activity tolerance;strength;cognition  -CE     Cognitive Impairments, Mobility Safety/Performance insight into deficits/self-awareness;problem-solving/reasoning;safety precaution awareness;safety precaution follow-through;sequencing abilities  -CE               User Key  (r) = Recorded By, (t) = Taken By, (c) = Cosigned By      Initials Name Provider Type    CE Yue Obrien OT Occupational Therapist                     Mobility/ADL's       Row Name 07/11/23 1508          Bed Mobility    Bed Mobility supine-sit  -CE     Supine-Sit [place] (Bed Mobility) minimum assist (75% patient effort)  -CE     Assistive Device (Bed Mobility) bed rails;head of bed elevated  -CE       Row Name 07/11/23 1508          Transfers    Transfers sit-stand transfer;bed-chair transfer  -CE       Row Name 07/11/23 1508          Bed-Chair Transfer    Bed-Chair [place] (Transfers) contact guard;1 person assist;1 person to manage equipment  -CE     Assistive Device (Bed-Chair Transfers) walker, front-wheeled  -CE       Row Name 07/11/23 1508          Sit-Stand Transfer    Sit-Stand [place] (Transfers) minimum assist (75% patient effort);2 person assist  -CE     Assistive Device (Sit-Stand Transfers) walker, front-wheeled  -CE       Row Name 07/11/23 1508          Functional Mobility    Functional Mobility- Comment Pt able to ambulate short distance in room with CGA x 1 + 1 using FWW with cues for sequencing.  -CE       Row Name 07/11/23 1508          Activities of Daily Living    BADL Assessment/Intervention lower body dressing  -CE       Row Name 07/11/23 1508          Lower Body Dressing Assessment/Training    [place] Level (Lower Body Dressing) don;pants/bottoms;shoes/slippers;minimum assist (75% patient effort)  -CE     Position (Lower Body Dressing) supported standing;edge of bed sitting  -CE                User Key  (r) = Recorded By, (t) = Taken By, (c) = Cosigned By      Initials Name Provider Type    CE Yue Obrien OT Occupational Therapist                   Obj/Interventions       [facility] Name 07/11/23 1510          Sensory Assessment (Somatosensory)    Sensory Assessment decreased sensation in L foot and pt with wound on heel from SNF per family; pt also with h/o L foot drop  -CE       [facility] Name 07/11/23 1510          Vision Assessment/Intervention    Visual Impairment/Limitations corrective lenses for distance;corrective lenses for reading  -CE       [facility] Name 07/11/23 1510          Range of Motion Comprehensive    General Range of Motion bilateral lower extremity ROM WFL;bilateral upper extremity ROM WFL  -CE       [facility] Name 07/11/23 1510          Strength Comprehensive (MMT)    Comment, General Manual Muscle Testing (MMT) Assessment generalized weakness throughout but grossly >/= 3/5  -CE       [facility] Name 07/11/23 1510          Motor Skills    Motor Skills functional endurance  -CE     Functional Endurance fair  -CE       Row Name 07/11/23 1510          Balance    Dynamic Standing Balance 1-person assist;1 person to manage equipment;contact guard  -CE               User Key  (r) = Recorded By, (t) = Taken By, (c) = Cosigned By      Initials Name Provider Type    CE Yue Obrien OT Occupational Therapist                   Goals/Plan       [facility] Name 07/11/23 1514          Transfer Goal 1 (OT)    Activity/Assistive Device (Transfer Goal 1, OT) transfers, all  -CE     [place] Level/Cues Needed (Transfer Goal 1, OT) standby assist  -CE     Time Frame (Transfer Goal 1, OT) short term goal (STG);2 weeks  -CE       Row Name 07/11/23 1514          Dressing Goal 1 (OT)    Activity/Device (Dressing Goal 1, OT) dressing skills, all  -CE     [place]/Cues Needed (Dressing Goal 1, OT) standby assist  -CE     Time Frame (Dressing Goal 1, OT) short term goal (STG);2 weeks  -CE       [facility] Name 07/11/23 1514           Toileting Goal 1 (OT)    Activity/Device (Toileting Goal 1, OT) toileting skills, all  -CE     [place] Level/Cues Needed (Toileting Goal 1, OT) standby assist  -CE     Time Frame (Toileting Goal 1, OT) short term goal (STG);2 weeks  -CE       Row Name 07/11/23 1514          Therapy Assessment/Plan (OT)    Planned Therapy Interventions (OT) activity tolerance training;functional balance retraining;occupation/activity based interventions;patient/caregiver education/training;strengthening exercise;transfer/mobility retraining  -CE               User Key  (r) = Recorded By, (t) = Taken By, (c) = Cosigned By      Initials Name Provider Type    CE Yue Obrien OT Occupational Therapist                   Clinical Impression       Row Name 07/11/23 1511          Pain Assessment    Pretreatment Pain Rating 0/10 - no pain  -CE     Posttreatment Pain Rating 0/10 - no pain  -CE       Row Name 07/11/23 1511          Plan of Care Review    Plan of Care Reviewed With patient;spouse;daughter  -CE     Outcome Evaluation Pt seen for OT eval after admission for acute resp failur and hypoxia after episode of aspiration. Of note, pt recently d/c'd to home from SNF 2/2 aspiration pna. Pt has h/o dementia and wife provides assist prn as well as 24 hr supervision. Pt able to complete LB ADLs alternating sit<>stand from EOB with Temi this date and ambulate short distance in room with CGA x 1 +1. OT discussed d/c recs of HH and pts family agreeable. OT will con't to follow for stated goals and recommend d/c to home with HHOT and 24 hr supervision/assist.  -CE       Row Name 07/11/23 1511          Therapy Assessment/Plan (OT)    Rehab Potential (OT) good, to achieve stated therapy goals  -CE     Criteria for Skilled Therapeutic Interventions Met (OT) yes  -CE     Therapy Frequency (OT) 3 times/wk  -CE       Row Name 07/11/23 1511          Therapy Plan Review/Discharge Plan (OT)    Anticipated Discharge Disposition (OT) home with  24/7 care;home with assist;home with home health  -CE       Row Name 07/11/23 1511          Vital Signs    O2 Delivery Pre Treatment room air  -CE     Pre Patient Position Supine  -CE     Intra Patient Position Standing  -CE     Post Patient Position Sitting  -CE       Row Name 07/11/23 1511          Positioning and Restraints    Pre-Treatment Position in bed  -CE     Post Treatment Position chair  -CE     In Chair notified nsg;reclined;call light within reach;encouraged to call for assist;exit alarm on;with family/caregiver  -CE               User Key  (r) = Recorded By, (t) = Taken By, (c) = Cosigned By      Initials Name Provider Type    CE Yue Obrien, KAROL Occupational Therapist                   Outcome Measures       Row Name 07/11/23 1515          How much help from another is currently needed...    Putting on and taking off regular lower body clothing? 3  -CE     Bathing (including washing, rinsing, and drying) 3  -CE     Toileting (which includes using toilet bed pan or urinal) 2  -CE     Putting on and taking off regular upper body clothing 3  -CE     Taking care of personal grooming (such as brushing teeth) 3  -CE     Eating meals 1  -CE     AM-PAC 6 Clicks Score (OT) 15  -CE       Row Name 07/11/23 1049 07/11/23 0946       How much help from another person do you currently need...    Turning from your back to your side while in flat bed without using bedrails? 3  -EJ (r) BR (t) EJ (c) 3  -ES    Moving from lying on back to sitting on the side of a flat bed without bedrails? 3  -EJ (r) BR (t) EJ (c) 3  -ES    Moving to and from a bed to a chair (including a wheelchair)? 3  -EJ (r) BR (t) EJ (c) 3  -ES    Standing up from a chair using your arms (e.g., wheelchair, bedside chair)? 3  -EJ (r) BR (t) EJ (c) 2  -ES    Climbing 3-5 steps with a railing? 2  -EJ (r) BR (t) EJ (c) 1  -ES    To walk in hospital room? 3  -EJ (r) BR (t) EJ (c) 2  -ES    AM-PAC 6 Clicks Score (PT) 17  -EJ (r) BR (t) 14  -ES     Highest level of mobility 5 --> Static standing  -EJ (r) BR (t) 4 --> Transferred to chair/commode  -ES      Row Name 07/11/23 1515 07/11/23 1049       Functional Assessment    Outcome Measure Options AM-PAC 6 Clicks Daily Activity (OT)  -CE AM-PAC 6 Clicks Basic Mobility (PT)  -EJ (r) BR (t) EJ (c)              User Key  (r) = Recorded By, (t) = Taken By, (c) = Cosigned By      Initials Name Provider Type    Charlene Patel, PT Physical Therapist    Cora Martinez, RN Registered Nurse    CE Yue Obrien, OT Occupational Therapist    Renae Hargrove, PT Student PT Student                    Occupational Therapy Education       Title: PT OT SLP Therapies (Done)       Topic: Occupational Therapy (Done)       Point: ADL training (Done)       Description:   Instruct learner(s) on proper safety adaptation and remediation techniques during self care or transfers.   Instruct in proper use of assistive devices.                  Learning Progress Summary             Patient Acceptance, E, VU,NR by CE at 7/11/2023 1517   Family Acceptance, E, VU,NR by CE at 7/11/2023 1517                         Point: Home exercise program (Done)       Description:   Instruct learner(s) on appropriate technique for monitoring, assisting and/or progressing therapeutic exercises/activities.                  Learning Progress Summary             Patient Acceptance, E, VU,NR by CE at 7/11/2023 1517   Family Acceptance, E, VU,NR by CE at 7/11/2023 1517                         Point: Precautions (Done)       Description:   Instruct learner(s) on prescribed precautions during self-care and functional transfers.                  Learning Progress Summary             Patient Acceptance, E, VU,NR by CE at 7/11/2023 1517   Family Acceptance, E, VU,NR by CE at 7/11/2023 1517                         Point: Body mechanics (Done)       Description:   Instruct learner(s) on proper positioning and spine alignment during self-care,  functional mobility activities and/or exercises.                  Learning Progress Summary             Patient Acceptance, E, VU,NR by CE at 7/11/2023 1517   Family Acceptance, E, VU,NR by CE at 7/11/2023 1517                                         User Key       Initials Effective Dates Name Provider Type Discipline    CE 10/17/22 -  Yue Obrien OT Occupational Therapist OT                  OT Recommendation and Plan  Planned Therapy Interventions (OT): activity tolerance training, functional balance retraining, occupation/activity based interventions, patient/caregiver education/training, strengthening exercise, transfer/mobility retraining  Therapy Frequency (OT): 3 times/wk  Plan of Care Review  Plan of Care Reviewed With: patient, spouse, daughter  Outcome Evaluation: Pt seen for OT eval after admission for acute resp failur and hypoxia after episode of aspiration. Of note, pt recently d/c'd to home from SNF 2/2 aspiration pna. Pt has h/o dementia and wife provides assist prn as well as 24 hr supervision. Pt able to complete LB ADLs alternating sit<>stand from EOB with Temi this date and ambulate short distance in room with CGA x 1 +1. OT discussed d/c recs of HH and pts family agreeable. OT will con't to follow for stated goals and recommend d/c to home with HHOT and 24 hr supervision/assist.     Time Calculation:    Time Calculation- OT       Row Name 07/11/23 1517             Time Calculation- OT    OT Start Time 0945  -CE      OT Stop Time 1003  -CE      OT Time Calculation (min) 18 min  -CE      Total Timed Code Minutes- OT 8 minute(s)  -CE      OT Received On 07/11/23  -CE      OT - Next Appointment 07/13/23  -CE      OT Goal Re-Cert Due Date 07/25/23  -CE         Timed Charges    54442 - OT Self Care/Mgmt Minutes 8  -CE         Total Minutes    Timed Charges Total Minutes 8  -CE       Total Minutes 8  -CE                User Key  (r) = Recorded By, (t) = Taken By, (c) = Cosigned By      Initials  Name Provider Type    CE Yue Obrien OT Occupational Therapist                  Therapy Charges for Today       Code Description Service Date Service Provider Modifiers Qty    13382609615 HC OT SELF CARE/MGMT/TRAIN EA 15 MIN 7/11/2023 Yue Obrien OT GO 1    36883973040 HC OT EVAL MOD COMPLEXITY 2 7/11/2023 Yue Obrien OT GO 1                 Yue Obrien OT  7/11/2023

## 2023-07-11 NOTE — THERAPY EVALUATION
Patient Name: Brandon Yo  : 1935    MRN: [id number]                              Today's Date: 2023       Admit Date: 2023    Visit Dx:     ICD-10-CM ICD-9-CM   1. Acute respiratory failure with hypoxia  J96.01 518.81   2. Aspiration pneumonitis  J69.0 507.0   3. COPD exacerbation  J44.1 491.21   4. Do not resuscitate  Z66 V49.86     Patient Active Problem List   Diagnosis    Acute respiratory failure with hypoxia     Past Medical History:   Diagnosis Date    Acid reflux     Aspiration into respiratory tract     Chronic right shoulder pain     H/O cervical discectomy     20 years prior    Hypertension      Past Surgical History:   Procedure Laterality Date    BACK SURGERY        General Information       Row Name 23 1039          Physical Therapy Time and Intention    Document Type evaluation (P)   -BR     Mode of Treatment physical therapy (P)   -BR       Row Name 23 1039          General Information    Patient Profile Reviewed yes (P)   -BR     Prior Level of Function mod assist:;max assist:;all household mobility;ADL's (P)   wife assists  -BR     Existing Precautions/Restrictions fall (P)   -BR     Barriers to Rehab none identified (P)   -BR       Row Name 23 1039          Living Environment    People in Home spouse (P)   -BR       Row Name 23 1039          Home Main Entrance    Number of Stairs, Main Entrance one (P)   -BR       Row Name 23 1039          Cognition    Orientation Status (Cognition) oriented to;person;place (P)   -BR       Row Name 23 1039          Safety Issues, Functional Mobility    Impairments Affecting Function (Mobility) balance;endurance/activity tolerance;strength (P)   -BR               User Key  (r) = Recorded By, (t) = Taken By, (c) = Cosigned By      Initials Name Provider Type    Renae Hargrove PT Student PT Student                   Mobility       Row Name 23 1040          Bed Mobility    Bed Mobility supine-sit (P)    -BR     Supine-Sit [place] (Bed Mobility) minimum assist (75% patient effort) (P)   -BR     Assistive Device (Bed Mobility) bed rails;head of bed elevated (P)   -BR       Row Name 07/11/23 1040          Bed-Chair Transfer    Bed-Chair [place] (Transfers) contact guard (P)   -BR     Assistive Device (Bed-Chair Transfers) walker, front-wheeled (P)   -BR       Row Name 07/11/23 1040          Sit-Stand Transfer    Sit-Stand [place] (Transfers) minimum assist (75% patient effort);2 person assist (P)   -BR     Assistive Device (Sit-Stand Transfers) walker, front-wheeled (P)   -BR       Row Name 07/11/23 1040          Gait/Stairs (Locomotion)    [place] Level (Gait) contact guard (P)   -BR     Assistive Device (Gait) walker, front-wheeled (P)   -BR     Distance in Feet (Gait) 10 (P)   -BR     Deviations/Abnormal Patterns (Gait) grey decreased;stride length decreased (P)   -BR     Bilateral Gait Deviations forward flexed posture;heel strike decreased (P)   -BR     Left Sided Gait Deviations foot drop/toe drag (P)   -BR     Comment, (Gait/Stairs) no LOB, slow pace (P)   -BR               User Key  (r) = Recorded By, (t) = Taken By, (c) = Cosigned By      Initials Name Provider Type    Renae Hargrove, PT Student PT Student                   Obj/Interventions       Row Name 07/11/23 1042          Range of Motion Comprehensive    General Range of Motion bilateral lower extremity ROM WFL (P)   -BR       Row Name 07/11/23 1042          Strength Comprehensive (MMT)    Comment, General Manual Muscle Testing (MMT) Assessment generalized BLE weakness (P)   -BR       Row Name 07/11/23 1042          Balance    Balance Assessment sitting static balance;standing static balance;standing dynamic balance (P)   -BR     Static Sitting Balance standby assist (P)   -BR     Position, Sitting Balance unsupported;sitting edge of bed (P)   -BR     Static Standing Balance contact guard (P)   -BR     Dynamic Standing  Balance contact guard (P)   -BR     Position/Device Used, Standing Balance walker, front-wheeled (P)   -BR     Comment, Balance no LOB (P)   -BR               User Key  (r) = Recorded By, (t) = Taken By, (c) = Cosigned By      Initials Name Provider Type    Renae Hargrove, PT Student PT Student                   Goals/Plan       Row Name 07/11/23 1048          Bed Mobility Goal 1 (PT)    Activity/Assistive Device (Bed Mobility Goal 1, PT) bed mobility activities, all (P)   -BR     [place] Level/Cues Needed (Bed Mobility Goal 1, PT) standby assist (P)   -BR     Time Frame (Bed Mobility Goal 1, PT) 1 week (P)   -BR       Row Name 07/11/23 1048          Transfer Goal 1 (PT)    Activity/Assistive Device (Transfer Goal 1, PT) transfers, all (P)   -BR     [place] Level/Cues Needed (Transfer Goal 1, PT) contact guard required (P)   -BR     Time Frame (Transfer Goal 1, PT) 1 week (P)   -BR       Row Name 07/11/23 1048          Gait Training Goal 1 (PT)    Activity/Assistive Device (Gait Training Goal 1, PT) gait (walking locomotion);walker, rolling (P)   -BR     [place] Level (Gait Training Goal 1, PT) contact guard required (P)   -BR     Distance (Gait Training Goal 1, PT) 100 (P)   -BR     Time Frame (Gait Training Goal 1, PT) 1 week (P)   -BR       Row Name 07/11/23 1048          Therapy Assessment/Plan (PT)    Planned Therapy Interventions (PT) balance training;bed mobility training;gait training;home exercise program;patient/family education;ROM (range of motion);strengthening;stretching (P)   -BR               User Key  (r) = Recorded By, (t) = Taken By, (c) = Cosigned By      Initials Name Provider Type    Renae Hargrove, PT Student PT Student                   Clinical Impression       Row Name 07/11/23 1042          Pain    Pretreatment Pain Rating 0/10 - no pain (P)   -BR       Row Name 07/11/23 1042          Plan of Care Review    Plan of Care Reviewed With patient;spouse;daughter (P)    -BR     Outcome Evaluation Pt agreed to PT eval this am. Pt admitted to [facility] for acute respiratory failure with hypoxia. Pt aspirated at home and had initial c/o SOB. Pt hx of COPD, HTN, dementia. Pt has L foot drop. Pt reported living with his wife with 1 step to enter. Wife assists with all ADLs. Pt uses a rwx at home and has a w/c when needed. Today, pt required no pain. He completed bed mobility with min assist. He stood with min assist x2 and rwx. Pt able to ambulate 10ft to chair with CGA/rwx. No LOB or unsteadiness noted. Pt demonstrates BLE weakness, decreased activity tolerance, and decreased functional mobility. Pt could benefit from skilled PT to increase mobility and strength. Recommend home with HH at [place]. (P)   -BR       Row Name 07/11/23 1042          Therapy Assessment/Plan (PT)    Rehab Potential (PT) good, to achieve stated therapy goals (P)   -BR     Criteria for Skilled Interventions Met (PT) yes (P)   -BR     Therapy Frequency (PT) 5 times/wk (P)   -BR       Row Name 07/11/23 1042          Positioning and Restraints    Pre-Treatment Position in bed (P)   -BR     Post Treatment Position chair (P)   -BR     In Chair notified nsg;reclined;call light within reach;encouraged to call for assist;exit alarm on;with family/caregiver (P)   -BR               User Key  (r) = Recorded By, (t) = Taken By, (c) = Cosigned By      Initials Name Provider Type    Renae Hargrove, PT Student PT Student                   Outcome Measures       Row Name 07/11/23 1049 07/11/23 0946       How much help from another person do you currently need...    Turning from your back to your side while in flat bed without using bedrails? 3 (P)   -BR 3  -ES    Moving from lying on back to sitting on the side of a flat bed without bedrails? 3 (P)   -BR 3  -ES    Moving to and from a bed to a chair (including a wheelchair)? 3 (P)   -BR 3  -ES    Standing up from a chair using your arms (e.g., wheelchair,  bedside chair)? 3 (P)   -BR 2  -ES    Climbing 3-5 steps with a railing? 2 (P)   -BR 1  -ES    To walk in hospital room? 3 (P)   -BR 2  -ES    AM-PAC 6 Clicks Score (PT) 17 (P)   -BR 14  -ES    Highest level of mobility 5 --> Static standing (P)   -BR 4 --> Transferred to chair/commode  -ES      Row Name 07/11/23 1049          Functional Assessment    Outcome Measure Options AM-PAC 6 Clicks Basic Mobility (PT) (P)   -BR               User Key  (r) = Recorded By, (t) = Taken By, (c) = Cosigned By      Initials Name Provider Type    ES Cora Winkler, RN Registered Nurse    Renae Hargrove, PT Student PT Student                                 Physical Therapy Education       Title: PT OT SLP Therapies (Done)       Topic: Physical Therapy (Done)       Point: Mobility training (Done)       Learning Progress Summary             Patient Acceptance, E,TB, VU by BR at 7/11/2023 1050   Family Acceptance, E,TB, VU by BR at 7/11/2023 1050                         Point: Home exercise program (Done)       Learning Progress Summary             Patient Acceptance, E,TB, VU by BR at 7/11/2023 1050   Family Acceptance, E,TB, VU by BR at 7/11/2023 1050                         Point: Body mechanics (Done)       Learning Progress Summary             Patient Acceptance, E,TB, VU by BR at 7/11/2023 1050   Family Acceptance, E,TB, VU by BR at 7/11/2023 1050                         Point: Precautions (Done)       Learning Progress Summary             Patient Acceptance, E,TB, VU by BR at 7/11/2023 1050   Family Acceptance, E,TB, VU by BR at 7/11/2023 1050                                         User Key       Initials Effective Dates Name Provider Type Discipline    BR 05/09/23 -  Renae Denis, PT Student PT Student PT                  PT Recommendation and Plan  Planned Therapy Interventions (PT): (P) balance training, bed mobility training, gait training, home exercise program, patient/family education, ROM (range of  motion), strengthening, stretching  Plan of Care Reviewed With: (P) patient, spouse, daughter  Outcome Evaluation: (P) Pt agreed to PT eval this am. Pt admitted to [facility] for acute respiratory failure with hypoxia. Pt aspirated at home and had initial c/o SOB. Pt hx of COPD, HTN, dementia. Pt has L foot drop. Pt reported living with his wife with 1 step to enter. Wife assists with all ADLs. Pt uses a rwx at home and has a w/c when needed. Today, pt required no pain. He completed bed mobility with min assist. He stood with min assist x2 and rwx. Pt able to ambulate 10ft to chair with CGA/rwx. No LOB or unsteadiness noted. Pt demonstrates BLE weakness, decreased activity tolerance, and decreased functional mobility. Pt could benefit from skilled PT to increase mobility and strength. Recommend home with HH at [place].     Time Calculation:    PT Charges       Row Name 07/11/23 1050             Time Calculation    Start Time 0946 (P)   -BR      Stop Time 1002 (P)   -BR      Time Calculation (min) 16 min (P)   -BR      PT Received On 07/11/23 (P)   -BR      PT - Next Appointment 07/12/23 (P)   -BR      PT Goal Re-Cert Due Date 07/18/23 (P)   -BR         Time Calculation- PT    Total Timed Code Minutes- PT 14 minute(s) (P)   -BR                User Key  (r) = Recorded By, (t) = Taken By, (c) = Cosigned By      Initials Name Provider Type    BR Renae Denis PT Student PT Student                  Therapy Charges for Today       Code Description Service Date Service Provider Modifiers Qty    21252473882 HC PT EVAL MOD COMPLEXITY 3 7/11/2023 Renae Denis, PT Student GP 1    62086466458 HC PT THER PROC EA 15 MIN 7/11/2023 Renae Denis, PT Student GP 1    91328078529 HC PT THER SUPP EA 15 MIN 7/11/2023 Renae Denis, PT Student GP 1            PT G-Codes  Outcome Measure Options: (P) AM-PAC 6 Clicks Basic Mobility (PT)  AM-PAC 6 Clicks Score (PT): (P) 17  PT Discharge Summary  Anticipated  Discharge Disposition (PT): (P) home with home health    Renae Denis, PT Student  7/11/2023

## 2023-07-11 NOTE — DISCHARGE PLACEMENT REQUEST
"Brandon Yo (87 y.o. Male)       Date of Birth   1935    Social Security Number       Address   [address] [place] [address]    Home Phone   [phone]    MRN   [id number]       [employer]   Taoism    Marital Status                               Admission Date   7/9/23    Admission Type   Emergency    Admitting Provider   Alirio Mendez MD    Attending Provider   Alirio Mendez MD    Department, Room/Bed   [address], S623/1       Discharge Date       Discharge Disposition       Discharge Destination                                 Attending Provider: Alirio Mendez MD    Allergies: No Known Allergies    Isolation: None   Infection: None   Code Status: No CPR    Ht: 175.3 cm (69\")   Wt: 70.4 kg (155 lb 3.3 oz)    Admission Cmt: None   Principal Problem: Acute respiratory failure with hypoxia [J96.01]                   Active Insurance as of 7/9/2023       Primary Coverage       Payor Plan Insurance Group Employer/Plan Group    MEDICARE MEDICARE A & B        Payor Plan Address Payor Plan Phone Number Payor Plan Fax Number Effective Dates    [address] [phone]  12/1/2001 - None Entered    [facility] [id number]         Subscriber Name Subscriber Birth Date Member ID       BRANDON YO 1935 [id number]               Secondary Coverage       Payor Plan Insurance Group Employer/Plan Group    [facility]        Payor Plan Address Payor Plan Phone Number Payor Plan Fax Number Effective Dates    [facility] [phone]  1/1/2017 - None Entered    [address]       [facility] [id number]         Subscriber Name Subscriber Birth Date Member ID       BRANDON YO 1935 [id number]                     Emergency Contacts        (Rel.) Home Phone Work Phone Mobile Phone    Bethany Yo (Spouse) [phone] -- --    Eda Yo (Daughter) -- -- [phone]              "

## 2023-07-12 ENCOUNTER — APPOINTMENT (OUTPATIENT)
Dept: GENERAL RADIOLOGY | Facility: HOSPITAL | Age: 88
DRG: 177 | End: 2023-07-12
Payer: MEDICARE

## 2023-07-12 LAB
ANION GAP SERPL CALCULATED.3IONS-SCNC: 11.3 MMOL/L (ref 5–15)
BASOPHILS # BLD AUTO: 0.02 10*3/MM3 (ref 0–0.2)
BASOPHILS NFR BLD AUTO: 0.1 % (ref 0–1.5)
BUN SERPL-MCNC: 29 MG/DL (ref 8–23)
BUN/CREAT SERPL: 26.6 (ref 7–25)
CALCIUM SPEC-SCNC: 9 MG/DL (ref 8.6–10.5)
CHLORIDE SERPL-SCNC: 108 MMOL/L (ref 98–107)
CO2 SERPL-SCNC: 23.7 MMOL/L (ref 22–29)
CREAT SERPL-MCNC: 1.09 MG/DL (ref 0.76–1.27)
DEPRECATED RDW RBC AUTO: 41.6 FL (ref 37–54)
EGFRCR SERPLBLD CKD-EPI 2021: 66.1 ML/MIN/1.73
EOSINOPHIL # BLD AUTO: 0.01 10*3/MM3 (ref 0–0.4)
EOSINOPHIL NFR BLD AUTO: 0.1 % (ref 0.3–6.2)
ERYTHROCYTE [DISTWIDTH] IN BLOOD BY AUTOMATED COUNT: 12.6 % (ref 12.3–15.4)
GLUCOSE SERPL-MCNC: 96 MG/DL (ref 65–99)
HCT VFR BLD AUTO: 32.9 % (ref 37.5–51)
HGB BLD-MCNC: 11 G/DL (ref 13–17.7)
IMM GRANULOCYTES # BLD AUTO: 0.07 10*3/MM3 (ref 0–0.05)
IMM GRANULOCYTES NFR BLD AUTO: 0.5 % (ref 0–0.5)
LYMPHOCYTES # BLD AUTO: 1.53 10*3/MM3 (ref 0.7–3.1)
LYMPHOCYTES NFR BLD AUTO: 10.4 % (ref 19.6–45.3)
MCH RBC QN AUTO: 30.4 PG (ref 26.6–33)
MCHC RBC AUTO-ENTMCNC: 33.4 G/DL (ref 31.5–35.7)
MCV RBC AUTO: 90.9 FL (ref 79–97)
MONOCYTES # BLD AUTO: 1.04 10*3/MM3 (ref 0.1–0.9)
MONOCYTES NFR BLD AUTO: 7.1 % (ref 5–12)
NEUTROPHILS NFR BLD AUTO: 11.99 10*3/MM3 (ref 1.7–7)
NEUTROPHILS NFR BLD AUTO: 81.8 % (ref 42.7–76)
NRBC BLD AUTO-RTO: 0 /100 WBC (ref 0–0.2)
PLATELET # BLD AUTO: 237 10*3/MM3 (ref 140–450)
PMV BLD AUTO: 10.7 FL (ref 6–12)
POTASSIUM SERPL-SCNC: 4 MMOL/L (ref 3.5–5.2)
RBC # BLD AUTO: 3.62 10*6/MM3 (ref 4.14–5.8)
SODIUM SERPL-SCNC: 143 MMOL/L (ref 136–145)
WBC NRBC COR # BLD: 14.66 10*3/MM3 (ref 3.4–10.8)

## 2023-07-12 PROCEDURE — 94761 N-INVAS EAR/PLS OXIMETRY MLT: CPT

## 2023-07-12 PROCEDURE — 80048 BASIC METABOLIC PNL TOTAL CA: CPT | Performed by: HOSPITALIST

## 2023-07-12 PROCEDURE — 94799 UNLISTED PULMONARY SVC/PX: CPT

## 2023-07-12 PROCEDURE — 94664 DEMO&/EVAL PT USE INHALER: CPT

## 2023-07-12 PROCEDURE — 74220 X-RAY XM ESOPHAGUS 1CNTRST: CPT

## 2023-07-12 PROCEDURE — 97116 GAIT TRAINING THERAPY: CPT

## 2023-07-12 PROCEDURE — 25010000002 METHYLPREDNISOLONE PER 40 MG: Performed by: HOSPITALIST

## 2023-07-12 PROCEDURE — 25010000002 PIPERACILLIN SOD-TAZOBACTAM PER 1 G: Performed by: HOSPITALIST

## 2023-07-12 PROCEDURE — 99232 SBSQ HOSP IP/OBS MODERATE 35: CPT | Performed by: INTERNAL MEDICINE

## 2023-07-12 PROCEDURE — 85025 COMPLETE CBC W/AUTO DIFF WBC: CPT | Performed by: HOSPITALIST

## 2023-07-12 PROCEDURE — 94668 MNPJ CHEST WALL SBSQ: CPT

## 2023-07-12 RX ORDER — LISINOPRIL 10 MG/1
10 TABLET ORAL
Status: DISCONTINUED | OUTPATIENT
Start: 2023-07-12 | End: 2023-07-12

## 2023-07-12 RX ORDER — PRIMIDONE 50 MG/1
50 TABLET ORAL NIGHTLY
Status: DISCONTINUED | OUTPATIENT
Start: 2023-07-12 | End: 2023-07-15 | Stop reason: HOSPADM

## 2023-07-12 RX ORDER — PREDNISONE 20 MG/1
20 TABLET ORAL
Status: DISCONTINUED | OUTPATIENT
Start: 2023-07-13 | End: 2023-07-14

## 2023-07-12 RX ORDER — HYDRALAZINE HYDROCHLORIDE 20 MG/ML
10 INJECTION INTRAMUSCULAR; INTRAVENOUS EVERY 4 HOURS PRN
Status: DISCONTINUED | OUTPATIENT
Start: 2023-07-12 | End: 2023-07-14

## 2023-07-12 RX ORDER — LOSARTAN POTASSIUM 100 MG/1
100 TABLET ORAL DAILY
Status: DISCONTINUED | OUTPATIENT
Start: 2023-07-12 | End: 2023-07-15 | Stop reason: HOSPADM

## 2023-07-12 RX ADMIN — IPRATROPIUM BROMIDE AND ALBUTEROL SULFATE 3 ML: .5; 3 SOLUTION RESPIRATORY (INHALATION) at 20:09

## 2023-07-12 RX ADMIN — BUDESONIDE AND FORMOTEROL FUMARATE DIHYDRATE 2 PUFF: 160; 4.5 AEROSOL RESPIRATORY (INHALATION) at 20:20

## 2023-07-12 RX ADMIN — BUDESONIDE AND FORMOTEROL FUMARATE DIHYDRATE 2 PUFF: 160; 4.5 AEROSOL RESPIRATORY (INHALATION) at 08:42

## 2023-07-12 RX ADMIN — BARIUM SULFATE 135 ML: 980 POWDER, FOR SUSPENSION ORAL at 12:47

## 2023-07-12 RX ADMIN — LATANOPROST 1 DROP: 50 SOLUTION OPHTHALMIC at 20:37

## 2023-07-12 RX ADMIN — METHYLPREDNISOLONE SODIUM SUCCINATE 20 MG: 40 INJECTION, POWDER, FOR SOLUTION INTRAMUSCULAR; INTRAVENOUS at 13:33

## 2023-07-12 RX ADMIN — IPRATROPIUM BROMIDE AND ALBUTEROL SULFATE 3 ML: .5; 3 SOLUTION RESPIRATORY (INHALATION) at 15:27

## 2023-07-12 RX ADMIN — PIPERACILLIN SODIUM AND TAZOBACTAM SODIUM 3.38 G: 3; .375 INJECTION, SOLUTION INTRAVENOUS at 23:59

## 2023-07-12 RX ADMIN — Medication 4 ML: at 08:34

## 2023-07-12 RX ADMIN — Medication 4 ML: at 20:14

## 2023-07-12 RX ADMIN — METHYLPREDNISOLONE SODIUM SUCCINATE 20 MG: 40 INJECTION, POWDER, FOR SOLUTION INTRAMUSCULAR; INTRAVENOUS at 01:04

## 2023-07-12 RX ADMIN — IPRATROPIUM BROMIDE AND ALBUTEROL SULFATE 3 ML: .5; 3 SOLUTION RESPIRATORY (INHALATION) at 08:30

## 2023-07-12 RX ADMIN — PIPERACILLIN SODIUM AND TAZOBACTAM SODIUM 3.38 G: 3; .375 INJECTION, SOLUTION INTRAVENOUS at 00:14

## 2023-07-12 RX ADMIN — BARIUM SULFATE 700 MG: 700 TABLET ORAL at 12:46

## 2023-07-12 RX ADMIN — PIPERACILLIN SODIUM AND TAZOBACTAM SODIUM 3.38 G: 3; .375 INJECTION, SOLUTION INTRAVENOUS at 15:03

## 2023-07-12 RX ADMIN — PIPERACILLIN SODIUM AND TAZOBACTAM SODIUM 3.38 G: 3; .375 INJECTION, SOLUTION INTRAVENOUS at 06:47

## 2023-07-12 NOTE — PLAN OF CARE
Goal Outcome Evaluation:  Plan of Care Reviewed With: patient, spouse, daughter        Progress: no change  Outcome Evaluation: Esophagram completed and results available.  Patient continues to be NPO.  Patient up in chair most shift and able to wean to RA.  Dressing changed to left heel this am.  Zosyn administered per order.  VS and labs monitored.

## 2023-07-12 NOTE — PLAN OF CARE
Goal Outcome Evaluation:  Plan of Care Reviewed With: patient           Outcome Evaluation: Pt agreeable to PT treatment this afternoon. Presented [facility], able to STS at min Ax1 and ambulated at CGAx1. Pt able to ambulate ~80ft, demonstrating improvement from prior treats. Pt was SOB after ambulating, took ~3 min for O2 sats to return to 90%. Pt returned to chair with call light in reach and family at side.

## 2023-07-12 NOTE — PROGRESS NOTES
"Nutrition Services    Patient Name:  Brandon Yo  YOB: 1935  MRN: [id number]  Admit Date:  7/9/2023    FOLLOW UP - CLINICAL NUTRITION    Assessment Date:  07/12/23    Encounter Information         Reason for Encounter NPO x 3 days / follow up    Current Issues S/p SLP evaluation 7/10 - rec NPO and GI consult.  GI following.  Esophagram today, awaiting results.       Current Nutrition Orders & Evaluation of Intake       Oral Nutrition     Current PO Diet NPO Diet NPO Type: Sips with Meds   Supplement n/a   PO Evaluation     % PO Intake     # of Days Evaluated     Factors Affecting Intake  altered GI function, swallow impairment   --  Anthropometrics          Height    Weight Height: 175.3 cm (69\")  Weight: 70.4 kg (155 lb 3.3 oz) (07/09/23 2135)    BMI kg/m2 Body mass index is 22.92 kg/m².  Normal/Healthy (18.4 - 24.9)    Weight trend Unknown     Estimated/Assessed Needs        Current Weight  Weight: 70.4 kg (155 lb 3.3 oz) (07/09/23 2135)       Energy Requirements    Weight for Calculation 155 lb (70.4 kg)   Method for Estimation  30-35 kcal/kg   EST Needs (kcal/day) 6898-3394       Protein Requirements    Weight for Calculation 155 lb (70.4 kg)   EST Protein Needs (g/kg) 1.2 - 1.5 gm/kg   EST Daily Needs (g/day)        Fluid Requirements     Method for Estimation 1 mL/kcal    EST Needs (mL/day) 2100     Labs        Pertinent Labs Reviewed, listed below     Results from last 7 days   Lab Units 07/12/23  0608 07/11/23  0638 07/09/23  2141   SODIUM mmol/L 143 138 139   POTASSIUM mmol/L 4.0 4.2 4.4   CHLORIDE mmol/L 108* 106 103   CO2 mmol/L 23.7 24.0 22.3   BUN mg/dL 29* 34* 20   CREATININE mg/dL 1.09 1.18 1.04   CALCIUM mg/dL 9.0 9.2 9.3   BILIRUBIN mg/dL  --  0.4 0.2   ALK PHOS U/L  --  59 83   ALT (SGPT) U/L  --  9 16   AST (SGOT) U/L  --  13 17   GLUCOSE mg/dL 96 128* 134*     Results from last 7 days   Lab Units 07/12/23  0608 07/11/23  0638   HEMOGLOBIN g/dL 11.0* 11.4*   HEMATOCRIT % " 32.9* 33.5*   WBC 10*3/mm3 14.66* 15.28*   TRIGLYCERIDES mg/dL  --  48   ALBUMIN g/dL  --  3.3*     Results from last 7 days   Lab Units 07/12/23  0608 07/11/23  0638 07/09/23  2141   INR   --   --  1.04   PLATELETS 10*3/mm3 237 242 295     No results found for: COVID19  Lab Results   Component Value Date    HGBA1C 5.40 07/11/2023          Medications            Scheduled Medications aspirin, 81 mg, Oral, Daily  budesonide-formoterol, 2 puff, Inhalation, BID - RT  donepezil, 10 mg, Oral, Daily  furosemide, 20 mg, Oral, Daily  gabapentin, 100 mg, Oral, TID  guaiFENesin, 600 mg, Oral, Q12H  ipratropium-albuterol, 3 mL, Nebulization, 4x Daily - RT  latanoprost, 1 drop, Both Eyes, Nightly  losartan, 100 mg, Oral, Daily  memantine, 10 mg, Oral, BID  metoprolol succinate XL, 50 mg, Oral, Daily  pantoprazole, 40 mg, Oral, BID AC  perflutren (DEFINITY) in 0.9% NS 10 mL syringe, 2 mL, Intravenous, Once in imaging  piperacillin-tazobactam, 3.375 g, Intravenous, Q8H  [START ON 7/13/2023] predniSONE, 20 mg, Oral, Daily With Breakfast  primidone, 50 mg, Oral, Nightly  sodium chloride, 4 mL, Nebulization, BID - RT        Infusions      PRN Medications   hydrALAZINE    [COMPLETED] Insert Peripheral IV **AND** sodium chloride     Physical Findings          Physical Appearance disoriented, on oxygen therapy   Oral/Mouth Cavity tooth or teeth missing   Edema  lower extremity , 1+ (trace)   Gastrointestinal fecal incontinence, last bowel movement: 7/10   Skin  bruising, excoriation, pressure injury, skin tear, other:L heel unst, abrasion   Tubes/Drains/Lines none   NFPE Not indicated at this time   --  NUTRITION INTERVENTION / PLAN OF CARE  Intervention Goal         Intervention Goal(s) Maintain nutrition status, Reduce/improve symptoms, Disease management/therapy, Initiate feeding/diet, and Maintain weight     Nutrition Intervention         RD Action Await begin PO diet, Follow Tx Progress, and Care plan reviewed     Nutrition  Prescription         Diet Prescription     Supplement Prescription n/a   EN/PN Prescription    New Prescription Ordered? No changes at this time   --  Monitor/Evaluation        Monitor Per protocol, I&O, Weight, Skin status, GI status, Symptoms, Swallow function   Discharge Needs Pending clinical course   Education Will instruct as appropriate   --    RD to follow up per protocol.    Electronically signed by:  Miroslava Mariee RD  07/12/23 14:57 EDT

## 2023-07-12 NOTE — PROGRESS NOTES
[facility]  Inpatient Progress Note    Reason for Followup:  dysphagia    Subjective:  No acute events overnight.  Esophagram completed today.    Current Facility-Administered Medications:     aspirin EC tablet 81 mg, 81 mg, Oral, Daily, Alirio Mendez MD    budesonide-formoterol (SYMBICORT) 160-4.5 MCG/ACT inhaler 2 puff, 2 puff, Inhalation, BID - RT, Alirio Mendez MD, 2 puff at 07/12/23 0842    donepezil (ARICEPT) tablet 10 mg, 10 mg, Oral, Daily, Alirio Mendez MD    furosemide (LASIX) tablet 20 mg, 20 mg, Oral, Daily, Alirio Mendez MD    gabapentin (NEURONTIN) capsule 100 mg, 100 mg, Oral, TID, Alirio Mendez MD    guaiFENesin (MUCINEX) 12 hr tablet 600 mg, 600 mg, Oral, Q12H, Alirio Mendez MD    hydrALAZINE (APRESOLINE) injection 10 mg, 10 mg, Intravenous, Q4H PRN, Alirio Mendez MD    ipratropium-albuterol (DUO-NEB) nebulizer solution 3 mL, 3 mL, Nebulization, 4x Daily - RT, Nilam Walker, APRN, 3 mL at 07/12/23 1527    latanoprost (XALATAN) 0.005 % ophthalmic solution 1 drop, 1 drop, Both Eyes, Nightly, Alirio Mendez MD, 1 drop at 07/11/23 2131    losartan (COZAAR) tablet 100 mg, 100 mg, Oral, Daily, Alirio Mendez MD    memantine (NAMENDA) tablet 10 mg, 10 mg, Oral, BID, Alirio Mendez MD    metoprolol succinate XL (TOPROL-XL) 24 hr tablet 50 mg, 50 mg, Oral, Daily, Alirio Mendez MD    pantoprazole (PROTONIX) EC tablet 40 mg, 40 mg, Oral, BID AC, Alirio Mendez MD    perflutren (DEFINITY) 8.476 mg in Sodium Chloride (PF) 0.9 % 10 mL injection, 2 mL, Intravenous, Once in imaging, Alirio Mendez MD    piperacillin-tazobactam (ZOSYN) 3.375 g in iso-osmotic dextrose 50 ml (premix), 3.375 g, Intravenous, Q8H, Alirio Mendez MD, 3.375 g at 07/12/23 1503    [START ON 7/13/2023] predniSONE (DELTASONE) tablet 20 mg, 20 mg, Oral, Daily With Breakfast, Alirio Mendez MD    primidone (MYSOLINE) tablet 50 mg, 50 mg, Oral, Nightly, Alirio Mendez MD    [COMPLETED] Insert Peripheral IV, , , Once **AND**  sodium chloride 0.9 % flush 10 mL, 10 mL, Intravenous, PRN, Carmine Bourne MD    sodium chloride 7 % nebulizer solution nebulizer solution 4 mL, 4 mL, Nebulization, BID - RT, Nilam Walker, APRN, 4 mL at 07/12/23 0834  Review of Systems:   Gen: No fever or chills  GI: No abd pain, nausea, vomiting      Objective     Vital Signs  Temp:  [97.5 °F (36.4 °C)-98.5 °F (36.9 °C)] 98 °F (36.7 °C)  Heart Rate:  [] 83  Resp:  [16-18] 16  BP: (142-170)/(68-88) 170/88  Body mass index is 22.92 kg/m².  No intake or output data in the 24 hours ending 07/12/23 1732  No intake/output data recorded.     Physical Exam:   General: patient awake, alert and cooperative   Cardiovascular: regular rate,  well-perfused extremities   Pulm: Equal expansion bilaterally, no increased WOB   Abdomen: soft, nontender, nondistended;               Neuro: A&O, no obvious sign of focal deficit       Results Review:     I reviewed the patient's new clinical results.    Results from last 7 days   Lab Units 07/12/23  0608 07/11/23  0638 07/09/23  2141   WBC 10*3/mm3 14.66* 15.28* 10.51   HEMOGLOBIN g/dL 11.0* 11.4* 14.4   HEMATOCRIT % 32.9* 33.5* 45.5   PLATELETS 10*3/mm3 237 242 295     Results from last 7 days   Lab Units 07/12/23  0608 07/11/23  0638 07/09/23  2141   SODIUM mmol/L 143 138 139   POTASSIUM mmol/L 4.0 4.2 4.4   CHLORIDE mmol/L 108* 106 103   CO2 mmol/L 23.7 24.0 22.3   BUN mg/dL 29* 34* 20   CREATININE mg/dL 1.09 1.18 1.04   CALCIUM mg/dL 9.0 9.2 9.3   BILIRUBIN mg/dL  --  0.4 0.2   ALK PHOS U/L  --  59 83   ALT (SGPT) U/L  --  9 16   AST (SGOT) U/L  --  13 17   GLUCOSE mg/dL 96 128* 134*     Results from last 7 days   Lab Units 07/09/23  2141   INR  1.04     No results found for: LIPASE    Radiology:  Narrative & Impression   FL ESOPHAGRAM COMPLETE SINGLE-CONTRAST-     HISTORY: 87-year-old male with dysphagia.     FINDINGS:  film of the chest shows underexpanded lungs with crowded  lung markings. There are patchy airspace  opacities in both lungs which  are indeterminate and may represent crowded lung markings, but pneumonia  cannot be excluded. Full inspiratory PA and lateral views of the chest  are recommended if possible. Please correlate clinically.     In the semiupright position, the patient was given thick barium to drink  through a straw. There is significant esophageal dysmotility and there  is a persistent narrowing at the distal third of the esophagus. A 13 mm  barium pill did not pass despite multiple additional sips of water as  well as thick barium. Evaluation of the distal esophageal stricture is  recommended with endoscopy.         Assessment & Plan   Assessment:   Aspiration PNA  Acute Hypoxic Respiratory Failure (improving)  COPD   Dementia  Dysphagia      Plan:   - Speech therapy following   - Esophagram with barium tablet as noted above  - NPO at midnight  - EGD tomorrow    I discussed the patient's findings and my recommendations with patient, family, and nursing staff.

## 2023-07-12 NOTE — PROGRESS NOTES
"Daily progress note    Primary care physician  Dr. PARIKH    Chief complaint  Doing better with no new complaints and family at bedside.    History of present illness  87-year-old white male with history of COPD dementia hypertension congestive heart failure and gastroesophageal reflux disease brought to the emergency room by the family with respiratory distress shortness of breath and found to be hypoxic.  Patient evaluated in ER and work-up revealed acute hypoxic respiratory failure with COPD exhibition and aspiration pneumonitis admitted for management.  Patient is demented unable to give detailed history most of the history obtained from the chart family nursing staff and old record.  Patient is DNR per his wishes.     REVIEW OF SYSTEMS  Unremarkable except difficulty swallowing     PHYSICAL EXAM  Blood pressure 170/88, pulse 83, temperature 98 °F (36.7 °C), temperature source Oral, resp. rate 18, height 175.3 cm (69\"), weight 70.4 kg (155 lb 3.3 oz), SpO2 93 %.    GENERAL: Awake and alert in mild distress and pleasantly confused  HENT: NCAT: nares patent: Neck supple  EYES: no scleral icterus  CV: regular rhythm, tachycardic   RESPIRATORY: Moderate respiratory distress with rhonchi in all lung fields  ABDOMEN: soft, NTND: Bowel sounds positive  MUSCULOSKELETAL: no deformity  NEURO: alert with nonfocal neuro exam  PSYCH:  calm, cooperative  SKIN: warm, dry     LAB RESULTS  Lab Results (last 24 hours)       Procedure Component Value Units Date/Time    Basic Metabolic Panel [[id number]]  (Abnormal) Collected: 07/12/23 0608    Specimen: Blood Updated: 07/12/23 0648     Glucose 96 mg/dL      BUN 29 mg/dL      Creatinine 1.09 mg/dL      Sodium 143 mmol/L      Potassium 4.0 mmol/L      Chloride 108 mmol/L      CO2 23.7 mmol/L      Calcium 9.0 mg/dL      BUN/Creatinine Ratio 26.6     Anion Gap 11.3 mmol/L      eGFR 66.1 mL/min/1.73     Narrative:      GFR Normal >60  Chronic Kidney Disease <60  Kidney Failure <15    The " GFR formula is only valid for adults with stable renal function between ages 18 and 70.    CBC & Differential [[id number]]  (Abnormal) Collected: 07/12/23 0608    Specimen: Blood Updated: 07/12/23 0632    Narrative:      The following orders were created for panel order CBC & Differential.  Procedure                               Abnormality         Status                     ---------                               -----------         ------                     CBC Auto Differential[[id number]]        Abnormal            Final result                 Please view results for these tests on the individual orders.    CBC Auto Differential [[id number]]  (Abnormal) Collected: 07/12/23 0608    Specimen: Blood Updated: 07/12/23 0632     WBC 14.66 10*3/mm3      RBC 3.62 10*6/mm3      Hemoglobin 11.0 g/dL      Hematocrit 32.9 %      MCV 90.9 fL      MCH 30.4 pg      MCHC 33.4 g/dL      RDW 12.6 %      RDW-SD 41.6 fl      MPV 10.7 fL      Platelets 237 10*3/mm3      Neutrophil % 81.8 %      Lymphocyte % 10.4 %      Monocyte % 7.1 %      Eosinophil % 0.1 %      Basophil % 0.1 %      Immature Grans % 0.5 %      Neutrophils, Absolute 11.99 10*3/mm3      Lymphocytes, Absolute 1.53 10*3/mm3      Monocytes, Absolute 1.04 10*3/mm3      Eosinophils, Absolute 0.01 10*3/mm3      Basophils, Absolute 0.02 10*3/mm3      Immature Grans, Absolute 0.07 10*3/mm3      nRBC 0.0 /100 WBC     Blood Culture - Blood, Arm, Right [[id number]]  (Normal) Collected: 07/09/23 2249    Specimen: Blood from Arm, Right Updated: 07/11/23 2300     Blood Culture No growth at 2 days    Blood Culture - Blood, Arm, Right [[id number]]  (Normal) Collected: 07/09/23 2156    Specimen: Blood from Arm, Right Updated: 07/11/23 2200     Blood Culture No growth at 2 days    Narrative:      Less than seven (7) mL's of blood was collected.  Insufficient quantity may yield false negative results.          Imaging Results (Last 24 Hours)       Procedure Component Value Units  Date/Time    FL Esophagram Complete Single Contrast [[id number]] Resulted: 07/12/23 1218     Updated: 07/12/23 1248          ECG 12 Lead Dyspnea: Patient Communication    Scan on 7/9/2023 2134 by [facility], [place]: ECG 12-LEAD         Author: -- Service: -- Author Type: --   Filed: Date of Service: Creation Time:   Status: (Other)   HEART RATE= 79  bpm  RR Interval= 759  ms  [place] Interval=   ms  P Horizontal Axis=   deg  P Front Axis=   deg  QRSD Interval= 137  ms  QT Interval= 421  ms  QRS Axis= -100  deg  T Wave Axis= 18  deg  - ABNORMAL ECG -  Rhythm analysis indeterminate due to severe baseline artifact          Current Facility-Administered Medications:     aspirin EC tablet 81 mg, 81 mg, Oral, Daily, Alirio Mendez MD    budesonide-formoterol (SYMBICORT) 160-4.5 MCG/ACT inhaler 2 puff, 2 puff, Inhalation, BID - RT, Alirio Mendez MD, 2 puff at 07/12/23 0842    donepezil (ARICEPT) tablet 10 mg, 10 mg, Oral, Daily, Alirio Mendez MD    furosemide (LASIX) tablet 20 mg, 20 mg, Oral, Daily, Alirio Mendez MD    gabapentin (NEURONTIN) capsule 100 mg, 100 mg, Oral, TID, Alirio Mendez MD    guaiFENesin (MUCINEX) 12 hr tablet 600 mg, 600 mg, Oral, Q12H, Alirio Mendez MD    ipratropium-albuterol (DUO-NEB) nebulizer solution 3 mL, 3 mL, Nebulization, 4x Daily - RT, Nilam Walker, APRN, 3 mL at 07/12/23 0830    latanoprost (XALATAN) 0.005 % ophthalmic solution 1 drop, 1 drop, Both Eyes, Nightly, Alirio Mendez MD, 1 drop at 07/11/23 2131    memantine (NAMENDA) tablet 10 mg, 10 mg, Oral, BID, Alirio Mendez MD    methylPREDNISolone sodium succinate (SOLU-Medrol) injection 20 mg, 20 mg, Intravenous, Q12H, Alirio Mendez MD, 20 mg at 07/12/23 1333    metoprolol succinate XL (TOPROL-XL) 24 hr tablet 50 mg, 50 mg, Oral, Daily, Alirio Mendez MD    pantoprazole (PROTONIX) EC tablet 40 mg, 40 mg, Oral, BID AC, Alirio Mendez MD    perflutren (DEFINITY) 8.476 mg in Sodium Chloride (PF) 0.9 % 10 mL injection, 2 mL, Intravenous, Once in  imaging, Rodríguez Mendez MD    piperacillin-tazobactam (ZOSYN) 3.375 g in iso-osmotic dextrose 50 ml (premix), 3.375 g, Intravenous, Q8H, Rodríguez Mendez MD, 3.375 g at 07/12/23 0647    [COMPLETED] Insert Peripheral IV, , , Once **AND** sodium chloride 0.9 % flush 10 mL, 10 mL, Intravenous, PRN, Carmine Bourne MD    sodium chloride 7 % nebulizer solution nebulizer solution 4 mL, 4 mL, Nebulization, BID - RT, Nilam Walker, APRN, 4 mL at 07/12/23 0834     ASSESSMENT  Acute hypoxic hypercapnic respiratory failure  Acute exacerbation of COPD  Aspiration pneumonitis   Dysphagia  Severe dementia  Hypertension  Osteoarthritis  Gastroesophageal reflux disease    PLAN  CPM  Supplemental oxygen nebulizer  BiPAP if needed  Continue IV antibiotics   Taper off steroids  Esophagogram today  GI and pulmonary to follow patient  Adjust home medications  Stress ulcer DVT prophylaxis  Supportive care  PT OT  DNR  Discussed with family and nursing staff  Follow closely and further recommendation current hospital course    RODRÍGUEZ MENDEZ MD    Copied text in this note has been reviewed and is accurate as of 07/12/23

## 2023-07-12 NOTE — PLAN OF CARE
Goal Outcome Evaluation:  Plan of Care Reviewed With: patient           Outcome Evaluation: Esophagram completed earlier this date, not yet read.  Will f/u for GI recs and swallow eval as needed.

## 2023-07-12 NOTE — PROGRESS NOTES
[facility] INPATIENT PROGRESS NOTE         [address]    2023      PATIENT IDENTIFICATION:  Name: Brandon Yo ADMIT: 2023   : 1935  PCP: Mike Covarrubias MD    MRN: [id number] LOS: 3 days   AGE/SEX: 87 y.o. male  ROOM: [facility]                     LOS 3    Reason for visit: respiratory failure       SUBJECTIVE:      Sleeping at time of visit.  No new issues.      Objective   OBJECTIVE:    Vital Sign Min/Max for last 24 hours  Temp  Min: 97.5 °F (36.4 °C)  Max: 98.5 °F (36.9 °C)   BP  Min: 139/64  Max: 158/80   Pulse  Min: 72  Max: 105   Resp  Min: 16  Max: 18   SpO2  Min: 92 %  Max: 100 %   No data recorded   No data recorded                         Body mass index is 22.92 kg/m².    Intake/Output Summary (Last 24 hours) at 2023 0809  Last data filed at 2023 1420  Gross per 24 hour   Intake 0 ml   Output --   Net 0 ml         Exam:  GEN:  No distress, appears stated age  NECK:  No adenopathy, midline trachea  LUNGS: Normal chest on inspection, palpation and auscultation      Assessment     Scheduled meds:  aspirin, 81 mg, Oral, Daily  budesonide-formoterol, 2 puff, Inhalation, BID - RT  donepezil, 10 mg, Oral, Daily  furosemide, 20 mg, Oral, Daily  gabapentin, 100 mg, Oral, TID  guaiFENesin, 600 mg, Oral, Q12H  ipratropium-albuterol, 3 mL, Nebulization, 4x Daily - RT  latanoprost, 1 drop, Both Eyes, Nightly  memantine, 10 mg, Oral, BID  methylPREDNISolone sodium succinate, 20 mg, Intravenous, Q12H  metoprolol succinate XL, 50 mg, Oral, Daily  pantoprazole, 40 mg, Oral, BID AC  perflutren (DEFINITY) in 0.9% NS 10 mL syringe, 2 mL, Intravenous, Once in imaging  piperacillin-tazobactam, 3.375 g, Intravenous, Q8H  sodium chloride, 4 mL, Nebulization, BID - RT      IV meds:                         Data Review:  Results from last 7 days   Lab Units 23  0608 23  0638 23  2141   SODIUM mmol/L 143 138 139   POTASSIUM mmol/L 4.0 4.2 4.4   CHLORIDE mmol/L 108*  106 103   CO2 mmol/L 23.7 24.0 22.3   BUN mg/dL 29* 34* 20   CREATININE mg/dL 1.09 1.18 1.04   GLUCOSE mg/dL 96 128* 134*   CALCIUM mg/dL 9.0 9.2 9.3         Estimated Creatinine Clearance: 48.4 mL/min (by C-G formula based on SCr of 1.09 mg/dL).  Results from last 7 days   Lab Units 07/12/23  0608 07/11/23  0638 07/09/23  2141   WBC 10*3/mm3 14.66* 15.28* 10.51   HEMOGLOBIN g/dL 11.0* 11.4* 14.4   PLATELETS 10*3/mm3 237 242 295     Results from last 7 days   Lab Units 07/09/23  2141   INR  1.04     Results from last 7 days   Lab Units 07/11/23  0638 07/09/23  2141   ALT (SGPT) U/L 9 16   AST (SGOT) U/L 13 17     Results from last 7 days   Lab Units 07/09/23  2153   PH, ARTERIAL pH units 7.276*   PO2 ART mm Hg 81.0   PCO2, ARTERIAL mm Hg 53.8*   HCO3 ART mmol/L 25.0     Results from last 7 days   Lab Units 07/09/23  2141   PROCALCITONIN ng/mL 0.03   LACTATE mmol/L 1.6         Hemoglobin A1C   Date/Time Value Ref Range Status   07/11/2023 0638 5.40 4.80 - 5.60 % Final     CXR 7/9 reviewed          Microbiology reviewed              Active Hospital Problems    Diagnosis  POA    **Acute respiratory failure with hypoxia [J96.01]  Yes      Resolved Hospital Problems   No resolved problems to display.         ASSESSMENT:  Acute respiratory failure with hypoxia  Acute respiratory failure with hypercapnia  Aspiration pneumonitis versus pneumonia  ILD  Dysphagia  COPD/emphysema  Chronically elevated right hemidiaphragm  Hypertension  Dementia      PLAN:  Encourage pulmonary toilet.  Bronchodilators and saline nebulized to help with clearance.  Elevate HOB for aspiration risk.    Off supplemental oxygen.    Antibiotic for aspiration PNA.        David Mello MD  Pulmonary and Critical Care Medicine  [facility]  7/12/2023    08:09 EDT

## 2023-07-12 NOTE — THERAPY TREATMENT NOTE
Patient Name: Brandon Yo  : 1935    MRN: [id number]                              Today's Date: 2023       Admit Date: 2023    Visit Dx:     ICD-10-CM ICD-9-CM   1. Acute respiratory failure with hypoxia  J96.01 518.81   2. Aspiration pneumonitis  J69.0 507.0   3. COPD exacerbation  J44.1 491.21   4. Do not resuscitate  Z66 V49.86     Patient Active Problem List   Diagnosis    Acute respiratory failure with hypoxia     Past Medical History:   Diagnosis Date    Acid reflux     Aspiration into respiratory tract     Chronic right shoulder pain     H/O cervical discectomy     20 years prior    Hypertension      Past Surgical History:   Procedure Laterality Date    BACK SURGERY        General Information       Row Name 23 1403          Physical Therapy Time and Intention    Document Type therapy note (daily note)  -DR     Mode of Treatment physical therapy  -DR               User Key  (r) = Recorded By, (t) = Taken By, (c) = Cosigned By      Initials Name Provider Type    Jose Luu, PT Physical Therapist                   Mobility       Row Name 23 1403          Sit-Stand Transfer    Sit-Stand Red Level (Transfers) minimum assist (75% patient effort);1 person assist;verbal cues  -     Assistive Device (Sit-Stand Transfers) walker, front-wheeled  -DR       Row Name 23 1404          Gait/Stairs (Locomotion)    Red Level Level (Gait) contact guard;verbal cues;1 person assist  -     Assistive Device (Gait) walker, front-wheeled  -DR     Distance in Feet (Gait) 80  -DR     Deviations/Abnormal Patterns (Gait) left sided deviations;grey decreased;stride length decreased  -DR     Bilateral Gait Deviations forward flexed posture  -DR     Left Sided Gait Deviations heel strike decreased  -DR     Comment, (Gait/Stairs) increased cues for direction to avoid running into objects in tighter spaces  -DR               User Key  (r) = Recorded By, (t) = Taken By, (c) = Cosigned  By      Initials Name Provider Type    Jose Luu, PT Physical Therapist                   Obj/Interventions    No documentation.                  Goals/Plan    No documentation.                  Clinical Impression       Row Name 07/12/23 1407          Pain    Pretreatment Pain Rating 0/10 - no pain  -     Posttreatment Pain Rating 0/10 - no pain  -       Row Name 07/12/23 1407          Plan of Care Review    Plan of Care Reviewed With patient  -DR     Outcome Evaluation Pt agreeable to PT treatment this afternoon. Presented [facility], able to STS at min Ax1 and ambulated at CGAx1. Pt able to ambulate ~80ft, demonstrating improvement from prior treats. Pt was SOB after ambulating, took ~3 min for O2 sats to return to 90%. Pt returned to chair with call light in reach and family at side.  -       Row Name 07/12/23 1407          Positioning and Restraints    Pre-Treatment Position sitting in chair/recliner  -     Post Treatment Position chair  -DR     In Chair notified nsg;reclined;call light within reach;encouraged to call for assist;exit alarm on;with family/caregiver  -               User Key  (r) = Recorded By, (t) = Taken By, (c) = Cosigned By      Initials Name Provider Type    Jose Luu, PT Physical Therapist                   Outcome Measures       Row Name 07/12/23 1411          How much help from another person do you currently need...    Turning from your back to your side while in flat bed without using bedrails? 3  -DR     Moving from lying on back to sitting on the side of a flat bed without bedrails? 3  -DR     Moving to and from a bed to a chair (including a wheelchair)? 3  -DR     Standing up from a chair using your arms (e.g., wheelchair, bedside chair)? 3  -DR     Climbing 3-5 steps with a railing? 2  -DR     To walk in hospital room? 3  -DR     AM-PAC 6 Clicks Score (PT) 17  -DR     Highest level of mobility 5 --> Static standing  -       Row Name 07/12/23 1411           Functional Assessment    Outcome Measure Options AM-PAC 6 Clicks Basic Mobility (PT)  -DR               User Key  (r) = Recorded By, (t) = Taken By, (c) = Cosigned By      Initials Name Provider Type    Jose Luu, PT Physical Therapist                                 Physical Therapy Education       Title: PT OT SLP Therapies (Done)       Topic: Physical Therapy (Done)       Point: Mobility training (Done)       Learning Progress Summary             Patient Acceptance, E,TB, VU by BR at 7/11/2023 1050   Family Acceptance, E,TB, VU by BR at 7/11/2023 1050                         Point: Home exercise program (Done)       Learning Progress Summary             Patient Acceptance, E,TB, VU by BR at 7/11/2023 1050   Family Acceptance, E,TB, VU by BR at 7/11/2023 1050                         Point: Body mechanics (Done)       Learning Progress Summary             Patient Acceptance, E,TB, VU by BR at 7/11/2023 1050   Family Acceptance, E,TB, VU by BR at 7/11/2023 1050                         Point: Precautions (Done)       Learning Progress Summary             Patient Acceptance, E,TB, VU by BR at 7/11/2023 1050   Family Acceptance, E,TB, VU by BR at 7/11/2023 1050                                         User Key       Initials Effective Dates Name Provider Type Discipline     05/09/23 -  Renae Denis, PT Student PT Student PT                  PT Recommendation and Plan     Plan of Care Reviewed With: patient  Outcome Evaluation: Pt agreeable to PT treatment this afternoon. Presented [facility], able to STS at min Ax1 and ambulated at CGAx1. Pt able to ambulate ~80ft, demonstrating improvement from prior treats. Pt was SOB after ambulating, took ~3 min for O2 sats to return to 90%. Pt returned to chair with call light in reach and family at side.     Time Calculation:    PT Charges       Row Name 07/12/23 1411             Time Calculation    Start Time 1346  -DR      Stop Time 1402  -DR      Time Calculation  (min) 16 min  -      PT Received On 07/12/23  -      PT - Next Appointment 07/13/23  -         Time Calculation- PT    Total Timed Code Minutes- PT 16 minute(s)  -                User Key  (r) = Recorded By, (t) = Taken By, (c) = Cosigned By      Initials Name Provider Type    Jose Luu, PT Physical Therapist                  Therapy Charges for Today       Code Description Service Date Service Provider Modifiers Qty    [id number] HC GAIT TRAINING EA 15 MIN 7/12/2023 Jose Martinez, PT GP 1            PT G-Codes  Outcome Measure Options: AM-PAC 6 Clicks Basic Mobility (PT)  AM-PAC 6 Clicks Score (PT): 17  AM-PAC 6 Clicks Score (OT): 15       Jose Martinez PT  7/12/2023

## 2023-07-12 NOTE — PLAN OF CARE
Goal Outcome Evaluation:      Alert to self and location (knows he's in the hospital but doesn't know what city he is in), incontinence care provided, IV abx and steroids continued. No complaints of pain. NPO. Dressing change completed on LLE.                    Attending Attestation (For Attendings USE Only)...

## 2023-07-13 PROBLEM — R13.19 ESOPHAGEAL DYSPHAGIA: Status: ACTIVE | Noted: 2023-07-09

## 2023-07-13 LAB
ANION GAP SERPL CALCULATED.3IONS-SCNC: 9.4 MMOL/L (ref 5–15)
BASOPHILS # BLD AUTO: 0.02 10*3/MM3 (ref 0–0.2)
BASOPHILS NFR BLD AUTO: 0.2 % (ref 0–1.5)
BUN SERPL-MCNC: 24 MG/DL (ref 8–23)
BUN/CREAT SERPL: 24.7 (ref 7–25)
CALCIUM SPEC-SCNC: 9 MG/DL (ref 8.6–10.5)
CHLORIDE SERPL-SCNC: 110 MMOL/L (ref 98–107)
CO2 SERPL-SCNC: 24.6 MMOL/L (ref 22–29)
CREAT SERPL-MCNC: 0.97 MG/DL (ref 0.76–1.27)
DEPRECATED RDW RBC AUTO: 41.1 FL (ref 37–54)
EGFRCR SERPLBLD CKD-EPI 2021: 76 ML/MIN/1.73
EOSINOPHIL # BLD AUTO: 0.06 10*3/MM3 (ref 0–0.4)
EOSINOPHIL NFR BLD AUTO: 0.6 % (ref 0.3–6.2)
ERYTHROCYTE [DISTWIDTH] IN BLOOD BY AUTOMATED COUNT: 12.5 % (ref 12.3–15.4)
GLUCOSE SERPL-MCNC: 82 MG/DL (ref 65–99)
HCT VFR BLD AUTO: 34.6 % (ref 37.5–51)
HGB BLD-MCNC: 11.3 G/DL (ref 13–17.7)
IMM GRANULOCYTES # BLD AUTO: 0.05 10*3/MM3 (ref 0–0.05)
IMM GRANULOCYTES NFR BLD AUTO: 0.5 % (ref 0–0.5)
LYMPHOCYTES # BLD AUTO: 1.89 10*3/MM3 (ref 0.7–3.1)
LYMPHOCYTES NFR BLD AUTO: 17.9 % (ref 19.6–45.3)
MCH RBC QN AUTO: 29.8 PG (ref 26.6–33)
MCHC RBC AUTO-ENTMCNC: 32.7 G/DL (ref 31.5–35.7)
MCV RBC AUTO: 91.3 FL (ref 79–97)
MONOCYTES # BLD AUTO: 0.73 10*3/MM3 (ref 0.1–0.9)
MONOCYTES NFR BLD AUTO: 6.9 % (ref 5–12)
NEUTROPHILS NFR BLD AUTO: 7.79 10*3/MM3 (ref 1.7–7)
NEUTROPHILS NFR BLD AUTO: 73.9 % (ref 42.7–76)
NRBC BLD AUTO-RTO: 0 /100 WBC (ref 0–0.2)
PLATELET # BLD AUTO: 253 10*3/MM3 (ref 140–450)
PMV BLD AUTO: 10.3 FL (ref 6–12)
POTASSIUM SERPL-SCNC: 3.9 MMOL/L (ref 3.5–5.2)
RBC # BLD AUTO: 3.79 10*6/MM3 (ref 4.14–5.8)
SODIUM SERPL-SCNC: 144 MMOL/L (ref 136–145)
WBC NRBC COR # BLD: 10.54 10*3/MM3 (ref 3.4–10.8)

## 2023-07-13 PROCEDURE — 0D748ZZ DILATION OF ESOPHAGOGASTRIC JUNCTION, VIA NATURAL OR ARTIFICIAL OPENING ENDOSCOPIC: ICD-10-PCS | Performed by: INTERNAL MEDICINE

## 2023-07-13 PROCEDURE — 94799 UNLISTED PULMONARY SVC/PX: CPT

## 2023-07-13 PROCEDURE — C1726 CATH, BAL DIL, NON-VASCULAR: HCPCS | Performed by: INTERNAL MEDICINE

## 2023-07-13 PROCEDURE — 97110 THERAPEUTIC EXERCISES: CPT

## 2023-07-13 PROCEDURE — 94760 N-INVAS EAR/PLS OXIMETRY 1: CPT

## 2023-07-13 PROCEDURE — 94761 N-INVAS EAR/PLS OXIMETRY MLT: CPT

## 2023-07-13 PROCEDURE — 25010000002 PIPERACILLIN SOD-TAZOBACTAM PER 1 G: Performed by: HOSPITALIST

## 2023-07-13 PROCEDURE — 0DB68ZX EXCISION OF STOMACH, VIA NATURAL OR ARTIFICIAL OPENING ENDOSCOPIC, DIAGNOSTIC: ICD-10-PCS | Performed by: INTERNAL MEDICINE

## 2023-07-13 PROCEDURE — 43239 EGD BIOPSY SINGLE/MULTIPLE: CPT | Performed by: INTERNAL MEDICINE

## 2023-07-13 PROCEDURE — 88305 TISSUE EXAM BY PATHOLOGIST: CPT | Performed by: INTERNAL MEDICINE

## 2023-07-13 PROCEDURE — 97530 THERAPEUTIC ACTIVITIES: CPT

## 2023-07-13 PROCEDURE — 0DB58ZX EXCISION OF ESOPHAGUS, VIA NATURAL OR ARTIFICIAL OPENING ENDOSCOPIC, DIAGNOSTIC: ICD-10-PCS | Performed by: INTERNAL MEDICINE

## 2023-07-13 PROCEDURE — 85025 COMPLETE CBC W/AUTO DIFF WBC: CPT | Performed by: HOSPITALIST

## 2023-07-13 PROCEDURE — 43249 ESOPH EGD DILATION <30 MM: CPT | Performed by: INTERNAL MEDICINE

## 2023-07-13 PROCEDURE — 94668 MNPJ CHEST WALL SBSQ: CPT

## 2023-07-13 PROCEDURE — 94664 DEMO&/EVAL PT USE INHALER: CPT

## 2023-07-13 PROCEDURE — 80048 BASIC METABOLIC PNL TOTAL CA: CPT | Performed by: HOSPITALIST

## 2023-07-13 RX ORDER — SODIUM CHLORIDE, SODIUM LACTATE, POTASSIUM CHLORIDE, CALCIUM CHLORIDE 600; 310; 30; 20 MG/100ML; MG/100ML; MG/100ML; MG/100ML
30 INJECTION, SOLUTION INTRAVENOUS CONTINUOUS PRN
Status: DISCONTINUED | OUTPATIENT
Start: 2023-07-13 | End: 2023-07-14

## 2023-07-13 RX ORDER — SODIUM CHLORIDE 9 MG/ML
30 INJECTION, SOLUTION INTRAVENOUS CONTINUOUS PRN
Status: DISCONTINUED | OUTPATIENT
Start: 2023-07-13 | End: 2023-07-15 | Stop reason: HOSPADM

## 2023-07-13 RX ADMIN — PANTOPRAZOLE SODIUM 40 MG: 40 TABLET, DELAYED RELEASE ORAL at 16:49

## 2023-07-13 RX ADMIN — Medication 4 ML: at 07:51

## 2023-07-13 RX ADMIN — METOPROLOL SUCCINATE 50 MG: 50 TABLET, FILM COATED, EXTENDED RELEASE ORAL at 16:49

## 2023-07-13 RX ADMIN — PIPERACILLIN SODIUM AND TAZOBACTAM SODIUM 3.38 G: 3; .375 INJECTION, SOLUTION INTRAVENOUS at 06:41

## 2023-07-13 RX ADMIN — GABAPENTIN 100 MG: 100 CAPSULE ORAL at 21:59

## 2023-07-13 RX ADMIN — GABAPENTIN 100 MG: 100 CAPSULE ORAL at 16:49

## 2023-07-13 RX ADMIN — LATANOPROST 1 DROP: 50 SOLUTION OPHTHALMIC at 22:03

## 2023-07-13 RX ADMIN — PRIMIDONE 50 MG: 50 TABLET ORAL at 21:59

## 2023-07-13 RX ADMIN — BUDESONIDE AND FORMOTEROL FUMARATE DIHYDRATE 2 PUFF: 160; 4.5 AEROSOL RESPIRATORY (INHALATION) at 07:51

## 2023-07-13 RX ADMIN — IPRATROPIUM BROMIDE AND ALBUTEROL SULFATE 3 ML: .5; 3 SOLUTION RESPIRATORY (INHALATION) at 07:51

## 2023-07-13 RX ADMIN — SODIUM CHLORIDE 30 ML/HR: 9 INJECTION, SOLUTION INTRAVENOUS at 14:45

## 2023-07-13 RX ADMIN — PIPERACILLIN SODIUM AND TAZOBACTAM SODIUM 3.38 G: 3; .375 INJECTION, SOLUTION INTRAVENOUS at 16:46

## 2023-07-13 RX ADMIN — MEMANTINE HYDROCHLORIDE 10 MG: 10 TABLET, FILM COATED ORAL at 21:59

## 2023-07-13 RX ADMIN — GUAIFENESIN 600 MG: 600 TABLET, EXTENDED RELEASE ORAL at 21:59

## 2023-07-13 RX ADMIN — PIPERACILLIN SODIUM AND TAZOBACTAM SODIUM 3.38 G: 3; .375 INJECTION, SOLUTION INTRAVENOUS at 22:47

## 2023-07-13 NOTE — PLAN OF CARE
Goal Outcome Evaluation:      VSS, no complaints of pain, q2 turns, incontinence care provided, NPO. All needs met.

## 2023-07-13 NOTE — PROGRESS NOTES
"Daily progress note    Primary care physician  Dr. PARIKH    Chief complaint  Doing same with no new complaints and family at bedside.    History of present illness  87-year-old white male with history of COPD dementia hypertension congestive heart failure and gastroesophageal reflux disease brought to the emergency room by the family with respiratory distress shortness of breath and found to be hypoxic.  Patient evaluated in ER and work-up revealed acute hypoxic respiratory failure with COPD exhibition and aspiration pneumonitis admitted for management.  Patient is demented unable to give detailed history most of the history obtained from the chart family nursing staff and old record.  Patient is DNR per his wishes.     REVIEW OF SYSTEMS  Unremarkable except difficulty swallowing     PHYSICAL EXAM  Blood pressure 172/88, pulse 78, temperature 98.2 °F (36.8 °C), temperature source Oral, resp. rate 16, height 175.3 cm (69\"), weight 70.4 kg (155 lb 3.3 oz), SpO2 94 %.    GENERAL: Awake and alert in mild distress and pleasantly confused  HENT: NCAT: nares patent: Neck supple  EYES: no scleral icterus  CV: regular rhythm, tachycardic   RESPIRATORY: Moderate respiratory distress with rhonchi in all lung fields  ABDOMEN: soft, NTND: Bowel sounds positive  MUSCULOSKELETAL: no deformity  NEURO: alert with nonfocal neuro exam  PSYCH:  calm, cooperative  SKIN: warm, dry     LAB RESULTS  Lab Results (last 24 hours)       Procedure Component Value Units Date/Time    Basic Metabolic Panel [[id number]]  (Abnormal) Collected: 07/13/23 0722    Specimen: Blood Updated: 07/13/23 0801     Glucose 82 mg/dL      BUN 24 mg/dL      Creatinine 0.97 mg/dL      Sodium 144 mmol/L      Potassium 3.9 mmol/L      Chloride 110 mmol/L      CO2 24.6 mmol/L      Calcium 9.0 mg/dL      BUN/Creatinine Ratio 24.7     Anion Gap 9.4 mmol/L      eGFR 76.0 mL/min/1.73     Narrative:      GFR Normal >60  Chronic Kidney Disease <60  Kidney Failure <15    The " GFR formula is only valid for adults with stable renal function between ages 18 and 70.    CBC & Differential [[id number]]  (Abnormal) Collected: 07/13/23 0722    Specimen: Blood Updated: 07/13/23 0747    Narrative:      The following orders were created for panel order CBC & Differential.  Procedure                               Abnormality         Status                     ---------                               -----------         ------                     CBC Auto Differential[[id number]]        Abnormal            Final result                 Please view results for these tests on the individual orders.    CBC Auto Differential [[id number]]  (Abnormal) Collected: 07/13/23 0722    Specimen: Blood Updated: 07/13/23 0747     WBC 10.54 10*3/mm3      RBC 3.79 10*6/mm3      Hemoglobin 11.3 g/dL      Hematocrit 34.6 %      MCV 91.3 fL      MCH 29.8 pg      MCHC 32.7 g/dL      RDW 12.5 %      RDW-SD 41.1 fl      MPV 10.3 fL      Platelets 253 10*3/mm3      Neutrophil % 73.9 %      Lymphocyte % 17.9 %      Monocyte % 6.9 %      Eosinophil % 0.6 %      Basophil % 0.2 %      Immature Grans % 0.5 %      Neutrophils, Absolute 7.79 10*3/mm3      Lymphocytes, Absolute 1.89 10*3/mm3      Monocytes, Absolute 0.73 10*3/mm3      Eosinophils, Absolute 0.06 10*3/mm3      Basophils, Absolute 0.02 10*3/mm3      Immature Grans, Absolute 0.05 10*3/mm3      nRBC 0.0 /100 WBC     Blood Culture - Blood, Arm, Right [[id number]]  (Normal) Collected: 07/09/23 2249    Specimen: Blood from Arm, Right Updated: 07/12/23 2301     Blood Culture No growth at 3 days    Blood Culture - Blood, Arm, Right [[id number]]  (Normal) Collected: 07/09/23 2156    Specimen: Blood from Arm, Right Updated: 07/12/23 2200     Blood Culture No growth at 3 days    Narrative:      Less than seven (7) mL's of blood was collected.  Insufficient quantity may yield false negative results.          Imaging Results (Last 24 Hours)       Procedure Component Value Units  Date/Time    FL Esophagram Complete Single Contrast [[id number]] Collected: 07/12/23 1600     Updated: 07/13/23 0655    Narrative:      FL ESOPHAGRAM COMPLETE SINGLE-CONTRAST-     HISTORY: 87-year-old male with dysphagia.     FINDINGS:  film of the chest shows underexpanded lungs with crowded  lung markings. There are patchy airspace opacities in both lungs which  are indeterminate and may represent crowded lung markings, but pneumonia  cannot be excluded. Full inspiratory PA and lateral views of the chest  are recommended if possible. Please correlate clinically.     In the semiupright position, the patient was given thick barium to drink  through a straw. There is significant esophageal dysmotility and there  is a persistent narrowing at the distal third of the esophagus. A 13 mm  barium pill did not pass despite multiple additional sips of water as  well as thick barium. Evaluation of the distal esophageal stricture is  recommended with endoscopy.     2 min 6 sec   195 Fluoro images   5032.64 DAP ?Gy*m2      This report was finalized on 7/13/2023 6:52 AM by Dr. Reyna Mittal M.D.             ECG 12 Lead Dyspnea: Patient Communication    Scan on 7/9/2023 2134 by [facility]: ECG 12-LEAD         Author: -- Service: -- Author Type: --   Filed: Date of Service: Creation Time:   Status: (Other)   HEART RATE= 79  bpm  RR Interval= 759  ms  SD Interval=   ms  P Horizontal Axis=   deg  P Front Axis=   deg  QRSD Interval= 137  ms  QT Interval= 421  ms  QRS Axis= -100  deg  T Wave Axis= 18  deg  - ABNORMAL ECG -  Rhythm analysis indeterminate due to severe baseline artifact          Current Facility-Administered Medications:     aspirin EC tablet 81 mg, 81 mg, Oral, Daily, Alirio Mendez MD    budesonide-formoterol (SYMBICORT) 160-4.5 MCG/ACT inhaler 2 puff, 2 puff, Inhalation, BID - RT, Alirio Mendez MD, 2 puff at 07/13/23 0751    donepezil (ARICEPT) tablet 10 mg, 10 mg, Oral, Daily, Alirio Mendez MD     furosemide (LASIX) tablet 20 mg, 20 mg, Oral, Daily, Alirio Mendez MD    gabapentin (NEURONTIN) capsule 100 mg, 100 mg, Oral, TID, Alirio Mendez MD    guaiFENesin (MUCINEX) 12 hr tablet 600 mg, 600 mg, Oral, Q12H, Alirio Mendez MD    hydrALAZINE (APRESOLINE) injection 10 mg, 10 mg, Intravenous, Q4H PRN, Alirio Mendez MD    ipratropium-albuterol (DUO-NEB) nebulizer solution 3 mL, 3 mL, Nebulization, 4x Daily - RT, Nilam Walker, APRN, 3 mL at 07/13/23 0751    lactated ringers infusion, 30 mL/hr, Intravenous, Continuous PRN, Jimmy Amor MD    latanoprost (XALATAN) 0.005 % ophthalmic solution 1 drop, 1 drop, Both Eyes, Nightly, Alirio Mendez MD, 1 drop at 07/12/23 2037    losartan (COZAAR) tablet 100 mg, 100 mg, Oral, Daily, Alirio Mendez MD    memantine (NAMENDA) tablet 10 mg, 10 mg, Oral, BID, Alirio Mendez MD    metoprolol succinate XL (TOPROL-XL) 24 hr tablet 50 mg, 50 mg, Oral, Daily, Alirio Mendez MD    pantoprazole (PROTONIX) EC tablet 40 mg, 40 mg, Oral, BID AC, Alirio Mendez MD    perflutren (DEFINITY) 8.476 mg in Sodium Chloride (PF) 0.9 % 10 mL injection, 2 mL, Intravenous, Once in imaging, Alirio Mendez MD    piperacillin-tazobactam (ZOSYN) 3.375 g in iso-osmotic dextrose 50 ml (premix), 3.375 g, Intravenous, Q8H, Alirio Mendez MD, 3.375 g at 07/13/23 0641    predniSONE (DELTASONE) tablet 20 mg, 20 mg, Oral, Daily With Breakfast, Alirio Mendez MD    primidone (MYSOLINE) tablet 50 mg, 50 mg, Oral, Nightly, Alirio Mendez MD    [COMPLETED] Insert Peripheral IV, , , Once **AND** sodium chloride 0.9 % flush 10 mL, 10 mL, Intravenous, PRN, Carmine Bourne MD    sodium chloride 0.9 % infusion, 30 mL/hr, Intravenous, Continuous PRN, Jimmy Amor MD, Last Rate: 30 mL/hr at 07/13/23 1445, 30 mL/hr at 07/13/23 1445    sodium chloride 7 % nebulizer solution nebulizer solution 4 mL, 4 mL, Nebulization, BID - RT, Nilam Walker, APRN, 4 mL at 07/13/23 0751     ASSESSMENT  Acute hypoxic hypercapnic  respiratory failure  Acute exacerbation of COPD  Aspiration pneumonitis   Dysphagia with esophageal dysmotility  Severe dementia  Hypertension  Osteoarthritis  Gastroesophageal reflux disease    PLAN  CPM  Supplemental oxygen nebulizer  Continue IV antibiotics   Endoscopy today  GI and pulmonary to follow patient  Adjust home medications  Stress ulcer DVT prophylaxis  Supportive care  PT OT  DNR  Discussed with family and nursing staff  Follow closely and further recommendation current hospital course    RODRÍGUEZ ADAMS MD    Copied text in this note has been reviewed and is accurate as of 07/13/23

## 2023-07-13 NOTE — PROGRESS NOTES
[facility] INPATIENT PROGRESS NOTE         [address]    2023      PATIENT IDENTIFICATION:  Name: Brandon Yo ADMIT: 2023   : 1935  PCP: Mike Covarrubias MD    MRN: [id number] LOS: 4 days   AGE/SEX: 87 y.o. male  ROOM: [facility]                     LOS 4    Reason for visit: respiratory failure       SUBJECTIVE:      No new issues overnight.      Objective   OBJECTIVE:    Vital Sign Min/Max for last 24 hours  Temp  Min: 97.5 °F (36.4 °C)  Max: 98 °F (36.7 °C)   BP  Min: 150/73  Max: 170/88   Pulse  Min: 74  Max: 103   Resp  Min: 16  Max: 20   SpO2  Min: 92 %  Max: 100 %   No data recorded   No data recorded                         Body mass index is 22.92 kg/m².  No intake or output data in the 24 hours ending 23 0814        Exam:  GEN:  No distress, appears stated age  NECK:  No adenopathy, midline trachea  LUNGS: Normal chest on inspection, palpation and auscultation      Assessment     Scheduled meds:  aspirin, 81 mg, Oral, Daily  budesonide-formoterol, 2 puff, Inhalation, BID - RT  donepezil, 10 mg, Oral, Daily  furosemide, 20 mg, Oral, Daily  gabapentin, 100 mg, Oral, TID  guaiFENesin, 600 mg, Oral, Q12H  ipratropium-albuterol, 3 mL, Nebulization, 4x Daily - RT  latanoprost, 1 drop, Both Eyes, Nightly  losartan, 100 mg, Oral, Daily  memantine, 10 mg, Oral, BID  metoprolol succinate XL, 50 mg, Oral, Daily  pantoprazole, 40 mg, Oral, BID AC  perflutren (DEFINITY) in 0.9% NS 10 mL syringe, 2 mL, Intravenous, Once in imaging  piperacillin-tazobactam, 3.375 g, Intravenous, Q8H  predniSONE, 20 mg, Oral, Daily With Breakfast  primidone, 50 mg, Oral, Nightly  sodium chloride, 4 mL, Nebulization, BID - RT      IV meds:                         Data Review:  Results from last 7 days   Lab Units 23  0722 23  0608 23  0638 23  2141   SODIUM mmol/L 144 143 138 139   POTASSIUM mmol/L 3.9 4.0 4.2 4.4   CHLORIDE mmol/L 110* 108* 106 103   CO2 mmol/L 24.6 23.7  24.0 22.3   BUN mg/dL 24* 29* 34* 20   CREATININE mg/dL 0.97 1.09 1.18 1.04   GLUCOSE mg/dL 82 96 128* 134*   CALCIUM mg/dL 9.0 9.0 9.2 9.3         Estimated Creatinine Clearance: 54.4 mL/min (by C-G formula based on SCr of 0.97 mg/dL).  Results from last 7 days   Lab Units 07/13/23  0722 07/12/23  0608 07/11/23  0638 07/09/23  2141   WBC 10*3/mm3 10.54 14.66* 15.28* 10.51   HEMOGLOBIN g/dL 11.3* 11.0* 11.4* 14.4   PLATELETS 10*3/mm3 253 237 242 295     Results from last 7 days   Lab Units 07/09/23  2141   INR  1.04     Results from last 7 days   Lab Units 07/11/23  0638 07/09/23  2141   ALT (SGPT) U/L 9 16   AST (SGOT) U/L 13 17     Results from last 7 days   Lab Units 07/09/23  2153   PH, ARTERIAL pH units 7.276*   PO2 ART mm Hg 81.0   PCO2, ARTERIAL mm Hg 53.8*   HCO3 ART mmol/L 25.0     Results from last 7 days   Lab Units 07/09/23  2141   PROCALCITONIN ng/mL 0.03   LACTATE mmol/L 1.6         Hemoglobin A1C   Date/Time Value Ref Range Status   07/11/2023 0638 5.40 4.80 - 5.60 % Final     CXR 7/9 reviewed          Microbiology reviewed              Active Hospital Problems    Diagnosis  POA    **Acute respiratory failure with hypoxia [J96.01]  Yes    Esophageal dysphagia [R13.19]  Unknown      Resolved Hospital Problems   No resolved problems to display.         ASSESSMENT:  Acute respiratory failure with hypoxia  Acute respiratory failure with hypercapnia  Aspiration pneumonitis versus pneumonia  ILD  Dysphagia  COPD/emphysema  Chronically elevated right hemidiaphragm  Hypertension  Dementia      PLAN:  Encourage pulmonary toilet.  Bronchodilators and saline nebulized to help with clearance.  Elevate HOB for aspiration risk.    Antibiotic for aspiration PNA.  Noted plan for EGD.      David Mello MD  Pulmonary and Critical Care Medicine  [facility], [facility]  7/13/2023    08:14 EDT

## 2023-07-13 NOTE — THERAPY TREATMENT NOTE
Patient Name: Brandon Yo  : 1935    MRN: [id number]                              Today's Date: 2023       Admit Date: 2023    Visit Dx:     ICD-10-CM ICD-9-CM   1. Esophageal dysphagia  R13.19 787.29   2. Acute respiratory failure with hypoxia  J96.01 518.81   3. Aspiration pneumonitis  J69.0 507.0   4. COPD exacerbation  J44.1 491.21   5. Do not resuscitate  Z66 V49.86     Patient Active Problem List   Diagnosis    Acute respiratory failure with hypoxia    Esophageal dysphagia     Past Medical History:   Diagnosis Date    Acid reflux     Aspiration into respiratory tract     Chronic right shoulder pain     H/O cervical discectomy     20 years prior    Hypertension      Past Surgical History:   Procedure Laterality Date    BACK SURGERY        General Information       Row Name 23 1138          Physical Therapy Time and Intention    Document Type therapy note (daily note) (P)   -BR     Mode of Treatment physical therapy (P)   -BR       Row Name 23 1138          Cognition    Orientation Status (Cognition) -- (P)   -BR               User Key  (r) = Recorded By, (t) = Taken By, (c) = Cosigned By      Initials Name Provider Type    Renae Hargrove, PT Student PT Student                   Mobility       Row Name 23 1139          Bed Mobility    Bed Mobility supine-sit (P)   -BR     Supine-Sit [place] (Bed Mobility) minimum assist (75% patient effort) (P)   -BR     Assistive Device (Bed Mobility) bed rails;head of bed elevated (P)   -BR       Row Name 23 1139          Bed-Chair Transfer    Bed-Chair [place] (Transfers) contact guard;1 person assist;1 person to manage equipment (P)   -BR     Assistive Device (Bed-Chair Transfers) walker, front-wheeled (P)   -BR       Row Name 23 1139          Sit-Stand Transfer    Sit-Stand [place] (Transfers) minimum assist (75% patient effort);verbal cues (P)   -BR     Assistive Device (Sit-Stand Transfers) walker,  front-wheeled (P)   -BR       Row Name 07/13/23 1139          Gait/Stairs (Locomotion)    [place] Level (Gait) contact guard;verbal cues;1 person assist (P)   -BR     Assistive Device (Gait) walker, front-wheeled (P)   -BR     Distance in Feet (Gait) 60 (P)   -BR     Deviations/Abnormal Patterns (Gait) grey decreased;stride length decreased;left sided deviations (P)   -BR     Bilateral Gait Deviations forward flexed posture (P)   -BR     Left Sided Gait Deviations heel strike decreased (P)   -BR     Comment, (Gait/Stairs) no LOB, didn't require as many cues for avoiding objects (P)   -BR               User Key  (r) = Recorded By, (t) = Taken By, (c) = Cosigned By      Initials Name Provider Type    Renae Hargrove, PT Student PT Student                   Obj/Interventions       Row Name 07/13/23 1140          Balance    Balance Assessment sitting static balance;standing static balance;standing dynamic balance (P)   -BR     Static Sitting Balance standby assist (P)   -BR     Position, Sitting Balance unsupported;sitting edge of bed (P)   -BR     Static Standing Balance contact guard (P)   -BR     Dynamic Standing Balance contact guard;1 person to manage equipment (P)   -BR     Position/Device Used, Standing Balance walker, front-wheeled (P)   -BR     Comment, Balance no LOB (P)   -BR               User Key  (r) = Recorded By, (t) = Taken By, (c) = Cosigned By      Initials Name Provider Type    Renae Hargrove, PT Student PT Student                   Goals/Plan    No documentation.                  Clinical Impression       Row Name 07/13/23 1141          Pain    Pretreatment Pain Rating 0/10 - no pain (P)   -BR     Posttreatment Pain Rating 0/10 - no pain (P)   -BR       Row Name 07/13/23 1141          Plan of Care Review    Plan of Care Reviewed With patient (P)   -BR     Outcome Evaluation Pt agreed to PT this am. Pt ambulated 60ft with CGA and rwx. No LOB or unsteadiness noted. Noted SOB at end  of session and O2 stats in 80s, took about 2 min to get O2 up to 90s on 2L O2. O2 back to 1L once O2 stats improved. Pt up in chair at end of session. Pt could continue to benefit from skilled PT to increase mobility and strength. (P)   -BR       Row Name 07/13/23 1141          Positioning and Restraints    Pre-Treatment Position in bed (P)   -BR     Post Treatment Position chair (P)   -BR     In Chair notified nsg;reclined;call light within reach;encouraged to call for assist;exit alarm on (P)   -BR               User Key  (r) = Recorded By, (t) = Taken By, (c) = Cosigned By      Initials Name Provider Type    Renae Hargrove, PT Student PT Student                   Outcome Measures       Row Name 07/13/23 1143          How much help from another person do you currently need...    Turning from your back to your side while in flat bed without using bedrails? 3 (P)   -BR     Moving from lying on back to sitting on the side of a flat bed without bedrails? 3 (P)   -BR     Moving to and from a bed to a chair (including a wheelchair)? 3 (P)   -BR     Standing up from a chair using your arms (e.g., wheelchair, bedside chair)? 3 (P)   -BR     Climbing 3-5 steps with a railing? 3 (P)   -BR     To walk in hospital room? 3 (P)   -BR     AM-PAC 6 Clicks Score (PT) 18 (P)   -BR     Highest level of mobility 6 --> Walked 10 steps or more (P)   -BR       Row Name 07/13/23 1300          Functional Assessment    Outcome Measure Options AM-PAC 6 Clicks Daily Activity (OT)  -MW               User Key  (r) = Recorded By, (t) = Taken By, (c) = Cosigned By      Initials Name Provider Type    Nilda Ernst OT Occupational Therapist    Renae Hargrove, PT Student PT Student                                 Physical Therapy Education       Title: PT OT SLP Therapies (Done)       Topic: Physical Therapy (Done)       Point: Mobility training (Done)       Learning Progress Summary             Patient Acceptance, E,TB, VU by  BR at 7/11/2023 1050   Family Acceptance, E,TB, VU by BR at 7/11/2023 1050                         Point: Home exercise program (Done)       Learning Progress Summary             Patient Acceptance, E,TB, VU by BR at 7/11/2023 1050   Family Acceptance, E,TB, VU by BR at 7/11/2023 1050                         Point: Body mechanics (Done)       Learning Progress Summary             Patient Acceptance, E,TB, VU by BR at 7/11/2023 1050   Family Acceptance, E,TB, VU by BR at 7/11/2023 1050                         Point: Precautions (Done)       Learning Progress Summary             Patient Acceptance, E,TB, VU by BR at 7/11/2023 1050   Family Acceptance, E,TB, VU by BR at 7/11/2023 1050                                         User Key       Initials Effective Dates Name Provider Type Discipline    BR 05/09/23 -  Renae Denis, PT Student PT Student PT                  PT Recommendation and Plan  Planned Therapy Interventions (PT): balance training, bed mobility training, gait training, home exercise program, patient/family education, ROM (range of motion), strengthening, stretching  Plan of Care Reviewed With: (P) patient  Outcome Evaluation: (P) Pt agreed to PT this am. Pt ambulated 60ft with CGA and rwx. No LOB or unsteadiness noted. Noted SOB at end of session and O2 stats in 80s, took about 2 min to get O2 up to 90s on 2L O2. O2 back to 1L once O2 stats improved. Pt up in chair at end of session. Pt could continue to benefit from skilled PT to increase mobility and strength.     Time Calculation:    PT Charges       Row Name 07/13/23 1144             Time Calculation    Start Time 1048 (P)   -BR      Stop Time 1100 (P)   -BR      Time Calculation (min) 12 min (P)   -BR      PT Received On 07/13/23 (P)   -BR      PT - Next Appointment 07/14/23 (P)   -BR                User Key  (r) = Recorded By, (t) = Taken By, (c) = Cosigned By      Initials Name Provider Type    BR Renae Denis, PT Student PT Student                   Therapy Charges for Today       Code Description Service Date Service Provider Modifiers Qty    25900386023 HC PT THER PROC EA 15 MIN 7/13/2023 Renae Denis, PT Student GP 1            PT G-Codes  Outcome Measure Options: AM-PAC 6 Clicks Daily Activity (OT)  AM-PAC 6 Clicks Score (PT): (P) 18  AM-PAC 6 Clicks Score (OT): 14  PT Discharge Summary  Anticipated Discharge Disposition (PT): home with home health    Renae Denis, PT Student  7/13/2023

## 2023-07-13 NOTE — SIGNIFICANT NOTE
07/13/23 0833   OTHER   Discipline speech language pathologist   Rehab Time/Intention   Session Not Performed other (see comments)  (Noted plan for EGD this date, SLP to continue to follow.)

## 2023-07-13 NOTE — THERAPY TREATMENT NOTE
Patient Name: Brandon Yo  : 1935    MRN: [id number]                              Today's Date: 2023       Admit Date: 2023    Visit Dx:     ICD-10-CM ICD-9-CM   1. Esophageal dysphagia  R13.19 787.29   2. Acute respiratory failure with hypoxia  J96.01 518.81   3. Aspiration pneumonitis  J69.0 507.0   4. COPD exacerbation  J44.1 491.21   5. Do not resuscitate  Z66 V49.86     Patient Active Problem List   Diagnosis    Acute respiratory failure with hypoxia    Esophageal dysphagia     Past Medical History:   Diagnosis Date    Acid reflux     Aspiration into respiratory tract     Chronic right shoulder pain     H/O cervical discectomy     20 years prior    Hypertension      Past Surgical History:   Procedure Laterality Date    BACK SURGERY        General Information       Row Name 23 1253          OT Time and Intention    Document Type therapy note (daily note)  -     Mode of Treatment occupational therapy  -       Row Name 23 1253          General Information    Patient Profile Reviewed yes  -     Existing Precautions/Restrictions fall  -       Row Name 23 1253          Cognition    Orientation Status (Cognition) oriented to;person;place  -       Row Name 23 1253          Safety Issues, Functional Mobility    Safety Issues Affecting Function (Mobility) insight into deficits/self-awareness;safety precautions follow-through/compliance;safety precaution awareness  -     Impairments Affecting Function (Mobility) balance;endurance/activity tolerance;strength;cognition  -               User Key  (r) = Recorded By, (t) = Taken By, (c) = Cosigned By      Initials Name Provider Type    MW Nilda Joshua OT Occupational Therapist                     Mobility/ADL's       Row Name 23 1257          Bed Mobility    Bed Mobility supine-sit  -MW     Supine-Sit [place] (Bed Mobility) minimum assist (75% patient effort)  -MW     Assistive Device (Bed Mobility) bed  rails;head of bed elevated  -     Comment, (Bed Mobility) increased time to complete  -MW       Row Name 07/13/23 1253          Transfers    Transfers sit-stand transfer;stand-sit transfer;toilet transfer  -MW       Row Name 07/13/23 1253          Sit-Stand Transfer    Sit-Stand Ray (Transfers) minimum assist (75% patient effort);1 person assist;verbal cues  -     Assistive Device (Sit-Stand Transfers) walker, front-wheeled  -MW       Row Name 07/13/23 1253          Stand-Sit Transfer    Stand-Sit Ray (Transfers) contact guard  -     Assistive Device (Stand-Sit Transfers) walker, front-wheeled  -MW       Row Name 07/13/23 1253          Toilet Transfer    Comment, (Toilet Transfer) demo approp distance to BR commode within room, declined need to void at time of session  -MW       Row Name 07/13/23 1253          Functional Mobility    Functional Mobility- Ind. Level contact guard assist  -     Functional Mobility- Device walker, front-wheeled  -MW       Row Name 07/13/23 1253          Activities of Daily Living    BADL Assessment/Intervention lower body dressing;feeding  -MW       Row Name 07/13/23 1253          Lower Body Dressing Assessment/Training    Ray Level (Lower Body Dressing) maximum assist (25% patient effort)  -     Comment, (Lower Body Dressing) brief donned, socks donned with max A  -MW       Row Name 07/13/23 1253          Self-Feeding Assessment/Training    Comment, (Feeding) NPO  -               User Key  (r) = Recorded By, (t) = Taken By, (c) = Cosigned By      Initials Name Provider Type    Nilda Ernst OT Occupational Therapist                   Obj/Interventions       Row Name 07/13/23 1254          Motor Skills    Motor Skills functional endurance  -     Functional Endurance on RA with func mob destat to 84% once UIC and able to recover within 1 min to 91% with 1L donned at end of session  -MW       Row Name 07/13/23 1253          Balance     Balance Assessment sitting static balance;sitting dynamic balance;sit to stand dynamic balance;standing static balance;standing dynamic balance  -MW     Static Sitting Balance standby assist  -MW     Dynamic Sitting Balance contact guard  -MW     Position, Sitting Balance sitting edge of bed  -MW     Sit to Stand Dynamic Balance minimal assist  -MW     Static Standing Balance contact guard  -MW     Dynamic Standing Balance minimal assist;1 person to manage equipment;contact guard  -MW     Position/Device Used, Standing Balance supported;walker, front-wheeled  -MW     Balance Interventions occupation based/functional task;minimal challenge  -MW     Comment, Balance no overt LOBs  -MW               User Key  (r) = Recorded By, (t) = Taken By, (c) = Cosigned By      Initials Name Provider Type    MW Nilda Joshua OT Occupational Therapist                   Goals/Plan    No documentation.                  Clinical Impression       Row Name 07/13/23 1256          Pain Assessment    Pretreatment Pain Rating 0/10 - no pain  -MW     Posttreatment Pain Rating 0/10 - no pain  -MW       Row Name 07/13/23 1256          Plan of Care Review    Plan of Care Reviewed With patient  -MW     Progress improving  -MW     Outcome Evaluation Pt seen for OT tx in AM this date, A&Ox2, agreeable and reports no pain. Pt remains NPO. Supine on arrival, min A x1 for bed mob, max/dep for LBD. STS with min A x1, CGA/min A for func mob this date, demo household distances and demo distance to BR commode however declined need to void. Pt UIC end of session, on RA with func mob with destat to 84% however quick recovery to 91% within one minute and 1L donned. Chair alarm in place. Pt will continue to benefit from skilled OT, plans home with HH and family assist at d/c.  -MW       Row Name 07/13/23 1256          Therapy Plan Review/Discharge Plan (OT)    Anticipated Discharge Disposition (OT) home with 24/7 care;home with assist;home with home  health  -MW       Row Name 07/13/23 1256          Vital Signs    O2 Delivery Pre Treatment supplemental O2  -MW     Intra SpO2 (%) 84  -MW     O2 Delivery Intra Treatment room air  -MW     Post SpO2 (%) 91  -MW     O2 Delivery Post Treatment supplemental O2  -MW     Pre Patient Position Supine  -MW     Intra Patient Position Standing  -MW     Post Patient Position Sitting  -MW       Row Name 07/13/23 1256          Positioning and Restraints    Pre-Treatment Position in bed  -MW     Post Treatment Position chair  -MW     In Chair reclined;notified nsg;call light within reach;encouraged to call for assist;exit alarm on  -MW               User Key  (r) = Recorded By, (t) = Taken By, (c) = Cosigned By      Initials Name Provider Type    Nilda Ernst, KAROL Occupational Therapist                   Outcome Measures       Row Name 07/13/23 1300          How much help from another is currently needed...    Putting on and taking off regular lower body clothing? 2  -MW     Bathing (including washing, rinsing, and drying) 3  -MW     Toileting (which includes using toilet bed pan or urinal) 2  -MW     Putting on and taking off regular upper body clothing 3  -MW     Taking care of personal grooming (such as brushing teeth) 3  -MW     Eating meals 1  NPO  -MW     AM-PAC 6 Clicks Score (OT) 14  -MW       Row Name 07/13/23 1143          How much help from another person do you currently need...    Turning from your back to your side while in flat bed without using bedrails? 3 (P)   -BR     Moving from lying on back to sitting on the side of a flat bed without bedrails? 3 (P)   -BR     Moving to and from a bed to a chair (including a wheelchair)? 3 (P)   -BR     Standing up from a chair using your arms (e.g., wheelchair, bedside chair)? 3 (P)   -BR     Climbing 3-5 steps with a railing? 3 (P)   -BR     To walk in hospital room? 3 (P)   -BR     AM-PAC 6 Clicks Score (PT) 18 (P)   -BR     Highest level of mobility 6 --> Walked 10  steps or more (P)   -BR       Row Name 07/13/23 1300          Functional Assessment    Outcome Measure Options AM-PAC 6 Clicks Daily Activity (OT)  -MW               User Key  (r) = Recorded By, (t) = Taken By, (c) = Cosigned By      Initials Name Provider Type    Nilda Ernst, OT Occupational Therapist    BR Renae Denis, PT Student PT Student                    Occupational Therapy Education       Title: PT OT SLP Therapies (Done)       Topic: Occupational Therapy (Done)       Point: ADL training (Done)       Description:   Instruct learner(s) on proper safety adaptation and remediation techniques during self care or transfers.   Instruct in proper use of assistive devices.                  Learning Progress Summary             Patient Acceptance, E, VU,NR by CE at 7/11/2023 1517   Family Acceptance, E, VU,NR by CE at 7/11/2023 1517                         Point: Home exercise program (Done)       Description:   Instruct learner(s) on appropriate technique for monitoring, assisting and/or progressing therapeutic exercises/activities.                  Learning Progress Summary             Patient Acceptance, E, VU,NR by CE at 7/11/2023 1517   Family Acceptance, E, VU,NR by CE at 7/11/2023 1517                         Point: Precautions (Done)       Description:   Instruct learner(s) on prescribed precautions during self-care and functional transfers.                  Learning Progress Summary             Patient Acceptance, E, VU,NR by CE at 7/11/2023 1517   Family Acceptance, E, VU,NR by CE at 7/11/2023 1517                         Point: Body mechanics (Done)       Description:   Instruct learner(s) on proper positioning and spine alignment during self-care, functional mobility activities and/or exercises.                  Learning Progress Summary             Patient Acceptance, E, VU,NR by CE at 7/11/2023 1517   Family Acceptance, E, VU,NR by CE at 7/11/2023 1517                                          User Key       Initials Effective Dates Name Provider Type Discipline    CE 10/17/22 -  Yue Obrien OT Occupational Therapist OT                  OT Recommendation and Plan     Plan of Care Review  Plan of Care Reviewed With: patient  Progress: improving  Outcome Evaluation: Pt seen for OT tx in AM this date, A&Ox2, agreeable and reports no pain. Pt remains NPO. Supine on arrival, min A x1 for bed mob, max/dep for LBD. STS with min A x1, CGA/min A for func mob this date, demo household distances and demo distance to BR commode however declined need to void. Pt UIC end of session, on RA with func mob with destat to 84% however quick recovery to 91% within one minute and 1L donned. Chair alarm in place. Pt will continue to benefit from skilled OT, plans home with HH and family assist at d/c.     Time Calculation:    Time Calculation- OT       Row Name 07/13/23 1301             Time Calculation- OT    OT Start Time 1045  -MW      OT Stop Time 1102  -MW      OT Time Calculation (min) 17 min  -MW      Total Timed Code Minutes- OT 17 minute(s)  -MW      OT Received On 07/13/23  -MW      OT - Next Appointment 07/14/23  -MW         Timed Charges    50175 - OT Therapeutic Activity Minutes 17  -MW         Total Minutes    Timed Charges Total Minutes 17  -MW       Total Minutes 17  -MW                User Key  (r) = Recorded By, (t) = Taken By, (c) = Cosigned By      Initials Name Provider Type    MW Nilda Joshua OT Occupational Therapist                  Therapy Charges for Today       Code Description Service Date Service Provider Modifiers Qty    54181009047 HC OT THERAPEUTIC ACT EA 15 MIN 7/13/2023 Nilda Joshua OT GO 1                 Nilda Joshua OT  7/13/2023

## 2023-07-13 NOTE — PLAN OF CARE
Goal Outcome Evaluation:  Plan of Care Reviewed With: (P) patient           Outcome Evaluation: (P) Pt agreed to PT this am. Pt ambulated 60ft with CGA and rwx. No LOB or unsteadiness noted. Noted SOB at end of session and O2 stats in 80s, took about 2 min to get O2 up to 90s on 2L O2. O2 back to 1L once O2 stats improved. Pt up in chair at end of session. Pt could continue to benefit from skilled PT to increase mobility and strength.      Anticipated Discharge Disposition (PT): home with home health

## 2023-07-13 NOTE — PLAN OF CARE
Goal Outcome Evaluation:  Plan of Care Reviewed With: patient        Progress: improving  Outcome Evaluation: Pt seen for OT tx in AM this date, A&Ox2, agreeable and reports no pain. Pt remains NPO. Supine on arrival, min A x1 for bed mob, max/dep for LBD. STS with min A x1, CGA/min A for func mob this date, demo household distances and demo distance to BR commode however declined need to void. Pt UIC end of session, on RA with func mob with destat to 84% however quick recovery to 91% within one minute and 1L donned. Chair alarm in place. Pt will continue to benefit from skilled OT, plans home with HH and family assist at d/c.      Anticipated Discharge Disposition (OT): home with 24/7 care, home with assist, home with home health

## 2023-07-14 LAB
ANION GAP SERPL CALCULATED.3IONS-SCNC: 10.9 MMOL/L (ref 5–15)
BACTERIA SPEC AEROBE CULT: NORMAL
BACTERIA SPEC AEROBE CULT: NORMAL
BASOPHILS # BLD AUTO: 0.07 10*3/MM3 (ref 0–0.2)
BASOPHILS NFR BLD AUTO: 0.7 % (ref 0–1.5)
BUN SERPL-MCNC: 21 MG/DL (ref 8–23)
BUN/CREAT SERPL: 20.4 (ref 7–25)
CALCIUM SPEC-SCNC: 8.7 MG/DL (ref 8.6–10.5)
CHLORIDE SERPL-SCNC: 106 MMOL/L (ref 98–107)
CO2 SERPL-SCNC: 24.1 MMOL/L (ref 22–29)
CREAT SERPL-MCNC: 1.03 MG/DL (ref 0.76–1.27)
DEPRECATED RDW RBC AUTO: 40.3 FL (ref 37–54)
EGFRCR SERPLBLD CKD-EPI 2021: 70.7 ML/MIN/1.73
EOSINOPHIL # BLD AUTO: 0.45 10*3/MM3 (ref 0–0.4)
EOSINOPHIL NFR BLD AUTO: 4.4 % (ref 0.3–6.2)
ERYTHROCYTE [DISTWIDTH] IN BLOOD BY AUTOMATED COUNT: 12.4 % (ref 12.3–15.4)
GLUCOSE SERPL-MCNC: 74 MG/DL (ref 65–99)
HCT VFR BLD AUTO: 37.9 % (ref 37.5–51)
HGB BLD-MCNC: 12.5 G/DL (ref 13–17.7)
IMM GRANULOCYTES # BLD AUTO: 0.05 10*3/MM3 (ref 0–0.05)
IMM GRANULOCYTES NFR BLD AUTO: 0.5 % (ref 0–0.5)
LAB AP CASE REPORT: NORMAL
LYMPHOCYTES # BLD AUTO: 1.38 10*3/MM3 (ref 0.7–3.1)
LYMPHOCYTES NFR BLD AUTO: 13.5 % (ref 19.6–45.3)
MCH RBC QN AUTO: 29.5 PG (ref 26.6–33)
MCHC RBC AUTO-ENTMCNC: 33 G/DL (ref 31.5–35.7)
MCV RBC AUTO: 89.4 FL (ref 79–97)
MONOCYTES # BLD AUTO: 0.85 10*3/MM3 (ref 0.1–0.9)
MONOCYTES NFR BLD AUTO: 8.3 % (ref 5–12)
NEUTROPHILS NFR BLD AUTO: 7.42 10*3/MM3 (ref 1.7–7)
NEUTROPHILS NFR BLD AUTO: 72.6 % (ref 42.7–76)
NRBC BLD AUTO-RTO: 0 /100 WBC (ref 0–0.2)
PATH REPORT.FINAL DX SPEC: NORMAL
PATH REPORT.GROSS SPEC: NORMAL
PLATELET # BLD AUTO: 269 10*3/MM3 (ref 140–450)
PMV BLD AUTO: 10.1 FL (ref 6–12)
POTASSIUM SERPL-SCNC: 4.1 MMOL/L (ref 3.5–5.2)
RBC # BLD AUTO: 4.24 10*6/MM3 (ref 4.14–5.8)
SODIUM SERPL-SCNC: 141 MMOL/L (ref 136–145)
WBC NRBC COR # BLD: 10.22 10*3/MM3 (ref 3.4–10.8)

## 2023-07-14 PROCEDURE — 94799 UNLISTED PULMONARY SVC/PX: CPT

## 2023-07-14 PROCEDURE — 63710000001 PREDNISONE PER 1 MG: Performed by: HOSPITALIST

## 2023-07-14 PROCEDURE — 85025 COMPLETE CBC W/AUTO DIFF WBC: CPT | Performed by: HOSPITALIST

## 2023-07-14 PROCEDURE — 80048 BASIC METABOLIC PNL TOTAL CA: CPT | Performed by: HOSPITALIST

## 2023-07-14 PROCEDURE — 94761 N-INVAS EAR/PLS OXIMETRY MLT: CPT

## 2023-07-14 PROCEDURE — 25010000002 PIPERACILLIN SOD-TAZOBACTAM PER 1 G: Performed by: HOSPITALIST

## 2023-07-14 PROCEDURE — 94664 DEMO&/EVAL PT USE INHALER: CPT

## 2023-07-14 PROCEDURE — 92526 ORAL FUNCTION THERAPY: CPT | Performed by: SPEECH-LANGUAGE PATHOLOGIST

## 2023-07-14 PROCEDURE — 97110 THERAPEUTIC EXERCISES: CPT

## 2023-07-14 PROCEDURE — 99232 SBSQ HOSP IP/OBS MODERATE 35: CPT | Performed by: NURSE PRACTITIONER

## 2023-07-14 RX ADMIN — MEMANTINE HYDROCHLORIDE 10 MG: 10 TABLET, FILM COATED ORAL at 09:42

## 2023-07-14 RX ADMIN — PIPERACILLIN SODIUM AND TAZOBACTAM SODIUM 3.38 G: 3; .375 INJECTION, SOLUTION INTRAVENOUS at 05:59

## 2023-07-14 RX ADMIN — LOSARTAN POTASSIUM 100 MG: 100 TABLET, FILM COATED ORAL at 09:42

## 2023-07-14 RX ADMIN — MEMANTINE HYDROCHLORIDE 10 MG: 10 TABLET, FILM COATED ORAL at 20:41

## 2023-07-14 RX ADMIN — ASPIRIN 81 MG: 81 TABLET, COATED ORAL at 09:42

## 2023-07-14 RX ADMIN — PIPERACILLIN SODIUM AND TAZOBACTAM SODIUM 3.38 G: 3; .375 INJECTION, SOLUTION INTRAVENOUS at 23:02

## 2023-07-14 RX ADMIN — GABAPENTIN 100 MG: 100 CAPSULE ORAL at 20:41

## 2023-07-14 RX ADMIN — PIPERACILLIN SODIUM AND TAZOBACTAM SODIUM 3.38 G: 3; .375 INJECTION, SOLUTION INTRAVENOUS at 15:11

## 2023-07-14 RX ADMIN — PANTOPRAZOLE SODIUM 40 MG: 40 TABLET, DELAYED RELEASE ORAL at 06:00

## 2023-07-14 RX ADMIN — GABAPENTIN 100 MG: 100 CAPSULE ORAL at 09:42

## 2023-07-14 RX ADMIN — Medication 4 ML: at 20:54

## 2023-07-14 RX ADMIN — GUAIFENESIN 600 MG: 600 TABLET, EXTENDED RELEASE ORAL at 20:41

## 2023-07-14 RX ADMIN — Medication 4 ML: at 07:11

## 2023-07-14 RX ADMIN — DONEPEZIL HYDROCHLORIDE 10 MG: 10 TABLET, FILM COATED ORAL at 09:42

## 2023-07-14 RX ADMIN — GABAPENTIN 100 MG: 100 CAPSULE ORAL at 17:13

## 2023-07-14 RX ADMIN — GUAIFENESIN 600 MG: 600 TABLET, EXTENDED RELEASE ORAL at 09:42

## 2023-07-14 RX ADMIN — PREDNISONE 20 MG: 20 TABLET ORAL at 09:42

## 2023-07-14 RX ADMIN — METOPROLOL SUCCINATE 50 MG: 50 TABLET, FILM COATED, EXTENDED RELEASE ORAL at 09:42

## 2023-07-14 RX ADMIN — FUROSEMIDE 20 MG: 20 TABLET ORAL at 09:42

## 2023-07-14 RX ADMIN — PRIMIDONE 50 MG: 50 TABLET ORAL at 20:42

## 2023-07-14 RX ADMIN — IPRATROPIUM BROMIDE AND ALBUTEROL SULFATE 3 ML: .5; 3 SOLUTION RESPIRATORY (INHALATION) at 11:53

## 2023-07-14 RX ADMIN — LATANOPROST 1 DROP: 50 SOLUTION OPHTHALMIC at 20:42

## 2023-07-14 RX ADMIN — IPRATROPIUM BROMIDE AND ALBUTEROL SULFATE 3 ML: .5; 3 SOLUTION RESPIRATORY (INHALATION) at 20:51

## 2023-07-14 RX ADMIN — IPRATROPIUM BROMIDE AND ALBUTEROL SULFATE 3 ML: .5; 3 SOLUTION RESPIRATORY (INHALATION) at 07:11

## 2023-07-14 RX ADMIN — BUDESONIDE AND FORMOTEROL FUMARATE DIHYDRATE 2 PUFF: 160; 4.5 AEROSOL RESPIRATORY (INHALATION) at 07:11

## 2023-07-14 RX ADMIN — IPRATROPIUM BROMIDE AND ALBUTEROL SULFATE 3 ML: .5; 3 SOLUTION RESPIRATORY (INHALATION) at 14:58

## 2023-07-14 RX ADMIN — PANTOPRAZOLE SODIUM 40 MG: 40 TABLET, DELAYED RELEASE ORAL at 17:13

## 2023-07-14 RX ADMIN — BUDESONIDE AND FORMOTEROL FUMARATE DIHYDRATE 2 PUFF: 160; 4.5 AEROSOL RESPIRATORY (INHALATION) at 20:59

## 2023-07-14 NOTE — THERAPY TREATMENT NOTE
Acute Care - Speech Language Pathology   Swallow Treatment Note [facility]     Patient Name: Brandon Yo  : 1935  MRN: [id number]  Today's Date: 2023               Admit Date: 2023    Visit Dx:     ICD-10-CM ICD-9-CM   1. Esophageal dysphagia  R13.19 787.29   2. Acute respiratory failure with hypoxia  J96.01 518.81   3. Aspiration pneumonitis  J69.0 507.0   4. COPD exacerbation  J44.1 491.21   5. Do not resuscitate  Z66 V49.86     Patient Active Problem List   Diagnosis    Acute respiratory failure with hypoxia    Esophageal dysphagia     Past Medical History:   Diagnosis Date    Acid reflux     Aspiration into respiratory tract     Chronic right shoulder pain     H/O cervical discectomy     20 years prior    Hypertension      Past Surgical History:   Procedure Laterality Date    BACK SURGERY      ENDOSCOPY N/A 2023    Procedure: ESOPHAGOGASTRODUODENOSCOPY WITH BIOPSY AND BALLOON DILATATION 15-16.5MM;  Surgeon: Jimmy Amro MD;  Location: [facility] ENDOSCOPY;  Service: Gastroenterology;  Laterality: N/A;  DYSPHAGIA  POST: GASTRIC POLYPS; ESOPHAGEAL STRICTURE; HIATAL HERNIA; GE JUNCTION INFLAMMATION       SLP Recommendation and Plan                                                                            Plan of Care Reviewed With: patient  Outcome Evaluation: Follow up regarding esophagram and EGD.  Esophagram showed dysmotility and narrowing.  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  Discussed with pt who states no further difficulties, but did state he felt chicken was dry and overdone with lunch.  Explained breads and meats often are most difficult and to use strategies of small bites, moist foods, and alternating soldis/liquids if any difficulties arise.      SWALLOW EVALUATION (last 72 hours)       SLP Adult Swallow Evaluation       Row Name 23 1230                   Rehab Evaluation    Document Type therapy note (daily note)   -           Clinical Swallow Eval    Clinical Swallow Evaluation Summary Follow up regarding esophagram and EGD.  Esophagram showed dysmotility and narrowing.  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  Discussed with pt who states no further difficulties, but did state he felt chicken was dry and overdone with lunch.  Explained breads and meats often are most difficult and to use strategies of small bites, moist foods, and alternating soldis/liquids if any difficulties arise.  -                  User Key  (r) = Recorded By, (t) = Taken By, (c) = Cosigned By      Initials Name Effective Dates    Ingrid Mijares MS CCC-SLP 07/11/23 -                     EDUCATION  The patient has been educated in the following areas:   Dysphagia (Swallowing Impairment).              Time Calculation:    Time Calculation- SLP       Row Name 07/14/23 1454             Time Calculation- SLP    SLP Start Time 1230  -                User Key  (r) = Recorded By, (t) = Taken By, (c) = Cosigned By      Initials Name Provider Type    Ingrid Mijares MS CCC-SLP Speech and Language Pathologist                    Therapy Charges for Today       Code Description Service Date Service Provider Modifiers Qty    [id number]  ST TREATMENT SWALLOW 2 7/14/2023 Ingrid Bhakta MS CCC-WAQAR GN 1                 MS VENICE Krishnamurthy  7/14/2023

## 2023-07-14 NOTE — THERAPY TREATMENT NOTE
Patient Name: Brandon Yo  : 1935    MRN: [id number]                              Today's Date: 2023       Admit Date: 2023    Visit Dx:     ICD-10-CM ICD-9-CM   1. Esophageal dysphagia  R13.19 787.29   2. Acute respiratory failure with hypoxia  J96.01 518.81   3. Aspiration pneumonitis  J69.0 507.0   4. COPD exacerbation  J44.1 491.21   5. Do not resuscitate  Z66 V49.86     Patient Active Problem List   Diagnosis    Acute respiratory failure with hypoxia    Esophageal dysphagia     Past Medical History:   Diagnosis Date    Acid reflux     Aspiration into respiratory tract     Chronic right shoulder pain     H/O cervical discectomy     20 years prior    Hypertension      Past Surgical History:   Procedure Laterality Date    BACK SURGERY      ENDOSCOPY N/A 2023    Procedure: ESOPHAGOGASTRODUODENOSCOPY WITH BIOPSY AND BALLOON DILATATION 15-16.5MM;  Surgeon: Jimmy Amor MD;  Location: [facility] ENDOSCOPY;  Service: Gastroenterology;  Laterality: N/A;  DYSPHAGIA  POST: GASTRIC POLYPS; ESOPHAGEAL STRICTURE; HIATAL HERNIA; GE JUNCTION INFLAMMATION      General Information       Row Name 23 1033          Physical Therapy Time and Intention    Document Type therapy note (daily note) (P)   -BR     Mode of Treatment physical therapy (P)   -BR       Row Name 23 1033          General Information    Patient Profile Reviewed yes (P)   -BR     Existing Precautions/Restrictions fall (P)   -BR               User Key  (r) = Recorded By, (t) = Taken By, (c) = Cosigned By      Initials Name Provider Type    BR Renae Denis PT Student PT Student                   Mobility       Row Name 23 1033          Bed Mobility    Bed Mobility supine-sit (P)   -BR     Supine-Sit [place] (Bed Mobility) minimum assist (75% patient effort);1 person assist (P)   -BR     Assistive Device (Bed Mobility) bed rails;head of bed elevated (P)   -BR       Row Name 23 1033          Bed-Chair  Transfer    Bed-Chair [place] (Transfers) contact guard;minimum assist (75% patient effort);1 person assist;1 person to manage equipment (P)   -BR     Assistive Device (Bed-Chair Transfers) walker, front-wheeled (P)   -BR       Row Name 07/14/23 1033          Sit-Stand Transfer    Sit-Stand [place] (Transfers) 2 person assist;moderate assist (50% patient effort);verbal cues (P)   -BR     Assistive Device (Sit-Stand Transfers) walker, front-wheeled (P)   -BR       Row Name 07/14/23 1033          Gait/Stairs (Locomotion)    [place] Level (Gait) contact guard;minimum assist (75% patient effort);1 person to manage equipment;1 person assist (P)   -BR     Assistive Device (Gait) walker, front-wheeled (P)   -BR     Distance in Feet (Gait) 60 (P)   -BR     Deviations/Abnormal Patterns (Gait) grey decreased;stride length decreased;left sided deviations (P)   -BR     Bilateral Gait Deviations forward flexed posture (P)   -BR     Left Sided Gait Deviations heel strike decreased (P)   -BR     Comment, (Gait/Stairs) no LOB, mild unsteadiness today, unable to fully clear L foot and got caught x 2, increased cues for rwx placement and getting RLE to go through full swing phase (P)   -BR               User Key  (r) = Recorded By, (t) = Taken By, (c) = Cosigned By      Initials Name Provider Type    Renae Hargrove PT Student PT Student                   Obj/Interventions       Row Name 07/14/23 1035          Balance    Balance Assessment sitting static balance;standing static balance;standing dynamic balance (P)   -BR     Static Sitting Balance standby assist (P)   -BR     Position, Sitting Balance unsupported;sitting edge of bed (P)   -BR     Static Standing Balance contact guard (P)   -BR     Dynamic Standing Balance contact guard;minimal assist;1-person assist;1 person to manage equipment (P)   -BR     Position/Device Used, Standing Balance supported;walker, front-wheeled (P)   -BR     Comment, Balance  minor LOB d/t L foot getting caught (P)   -BR               User Key  (r) = Recorded By, (t) = Taken By, (c) = Cosigned By      Initials Name Provider Type    Renae Hargrove, PT Student PT Student                   Goals/Plan    No documentation.                  Clinical Impression       Row Name 07/14/23 1035          Pain    Pretreatment Pain Rating 0/10 - no pain (P)   -BR     Posttreatment Pain Rating 0/10 - no pain (P)   -BR       Row Name 07/14/23 1035          Plan of Care Review    Plan of Care Reviewed With patient;family (P)   -BR     Outcome Evaluation Pt agreed to PT this am. Pt in bed upon arrival. He completed bed mobility with min asist x1. Today he required mod assist x 2 with rwx to stand. Pt ambulated 60ft with CGA/min assist x1 and rwx with 1 person to manage equipment. Noted more unsteadiness today and minor LOB d/t L foot unable to clear ground and getting caught. Increased verbal cues needed to keep rwx in middle of body and to get R LE to swing fully through.Pt assisted to chair at end of session with family present. Pt could continue to benefit from skilled PT to increase mobiity and strength. (P)   -BR       Row Name 07/14/23 1035          Positioning and Restraints    Pre-Treatment Position in bed (P)   -BR     Post Treatment Position chair (P)   -BR     In Chair notified nsg;sitting;call light within reach;encouraged to call for assist;exit alarm on;with family/caregiver (P)   -BR               User Key  (r) = Recorded By, (t) = Taken By, (c) = Cosigned By      Initials Name Provider Type    Renae Hargrove, PT Student PT Student                   Outcome Measures       Row Name 07/14/23 1040 07/14/23 0945       How much help from another person do you currently need...    Turning from your back to your side while in flat bed without using bedrails? 3 (P)   -BR 3  -MS    Moving from lying on back to sitting on the side of a flat bed without bedrails? 3 (P)   -BR 3  -MS     Moving to and from a bed to a chair (including a wheelchair)? 3 (P)   -BR 3  -MS    Standing up from a chair using your arms (e.g., wheelchair, bedside chair)? 3 (P)   -BR 3  -MS    Climbing 3-5 steps with a railing? 3 (P)   -BR 3  -MS    To walk in hospital room? 3 (P)   -BR 3  -MS    AM-PAC 6 Clicks Score (PT) 18 (P)   -BR 18  -MS    Highest level of mobility 6 --> Walked 10 steps or more (P)   -BR 6 --> Walked 10 steps or more  -MS      Row Name 07/14/23 1040          Functional Assessment    Outcome Measure Options AM-PAC 6 Clicks Basic Mobility (PT) (P)   -BR               User Key  (r) = Recorded By, (t) = Taken By, (c) = Cosigned By      Initials Name Provider Type    Ara Mckeon, RN Registered Nurse    Renae Hargrove, PT Student PT Student                                 Physical Therapy Education       Title: PT OT SLP Therapies (Done)       Topic: Physical Therapy (Done)       Point: Mobility training (Done)       Learning Progress Summary             Patient Acceptance, E,TB, VU by BR at 7/11/2023 1050   Family Acceptance, E,TB, VU by BR at 7/11/2023 1050                         Point: Home exercise program (Done)       Learning Progress Summary             Patient Acceptance, E,TB, VU by BR at 7/11/2023 1050   Family Acceptance, E,TB, VU by BR at 7/11/2023 1050                         Point: Body mechanics (Done)       Learning Progress Summary             Patient Acceptance, E,TB, VU by BR at 7/11/2023 1050   Family Acceptance, E,TB, VU by BR at 7/11/2023 1050                         Point: Precautions (Done)       Learning Progress Summary             Patient Acceptance, E,TB, VU by BR at 7/11/2023 1050   Family Acceptance, E,TB, VU by BR at 7/11/2023 1050                                         User Key       Initials Effective Dates Name Provider Type Lorenzo CONTRERAS 05/09/23 -  Renae Denis, PT Student PT Student PT                  PT Recommendation and Plan  Planned Therapy  Interventions (PT): balance training, bed mobility training, gait training, home exercise program, patient/family education, ROM (range of motion), strengthening, stretching  Plan of Care Reviewed With: (P) patient, family  Outcome Evaluation: (P) Pt agreed to PT this am. Pt in bed upon arrival. He completed bed mobility with min asist x1. Today he required mod assist x 2 with rwx to stand. Pt ambulated 60ft with CGA/min assist x1 and rwx with 1 person to manage equipment. Noted more unsteadiness today and minor LOB d/t L foot unable to clear ground and getting caught. Increased verbal cues needed to keep rwx in middle of body and to get R LE to swing fully through.Pt assisted to chair at end of session with family present. Pt could continue to benefit from skilled PT to increase mobiity and strength.     Time Calculation:    PT Charges       Row Name 07/14/23 1040             Time Calculation    Start Time 1012 (P)   -BR      Stop Time 1022 (P)   -BR      Time Calculation (min) 10 min (P)   -BR      PT Received On 07/14/23 (P)   -BR      PT - Next Appointment 07/17/23 (P)   -BR                User Key  (r) = Recorded By, (t) = Taken By, (c) = Cosigned By      Initials Name Provider Type    Renae Hargrove PT Student PT Student                  Therapy Charges for Today       Code Description Service Date Service Provider Modifiers Qty    69679030283 HC PT THER PROC EA 15 MIN 7/13/2023 Renae Denis, PT Student GP 1    17529817513 HC PT THER SUPP EA 15 MIN 7/14/2023 Renae Denis, PT Student GP 1    39026781134 HC PT THER PROC EA 15 MIN 7/14/2023 Renae Denis, PT Student GP 1            PT G-Codes  Outcome Measure Options: (P) AM-PAC 6 Clicks Basic Mobility (PT)  AM-PAC 6 Clicks Score (PT): (P) 18  AM-PAC 6 Clicks Score (OT): 14  PT Discharge Summary  Anticipated Discharge Disposition (PT): home with home health    Renae Denis PT Student  7/14/2023

## 2023-07-14 NOTE — PLAN OF CARE
Goal Outcome Evaluation:  Plan of Care Reviewed With: patient           Outcome Evaluation: Follow up regarding esophagram and EGD.  Esophagram showed dysmotility and narrowing.  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  Discussed with pt who states no further difficulties, but did state he felt chicken was dry and overdone with lunch.  Explained breads and meats often are most difficult and to use strategies of small bites, moist foods, and alternating soldis/liquids if any difficulties arise.

## 2023-07-14 NOTE — CASE MANAGEMENT/SOCIAL WORK
Continued Stay Note  [facility]     Patient Name: Brandon Yo  MRN: [id number]  Today's Date: 7/14/2023    Admit Date: 7/9/2023    [facility]    Discharge Plan       Row Name 07/14/23 1414       Plan    [facility]     Plan Comments Patient up in chair.  No family at bedside.  [facility]  can accept patient at DC.  CCP following.  Carolynn NDIAYE                 Expected Discharge Date and Time       Expected Discharge Date Expected Discharge Time    Jul 17, 2023               Becky S. [place], RN

## 2023-07-14 NOTE — PLAN OF CARE
Goal Outcome Evaluation:  Plan of Care Reviewed With: (P) patient, family           Outcome Evaluation: (P) Pt agreed to PT this am. Pt in bed upon arrival. He completed bed mobility with min asist x1. Today he required mod assist x 2 with rwx to stand. Pt ambulated 60ft with CGA/min assist x1 and rwx with 1 person to manage equipment. Noted more unsteadiness today and minor LOB d/t L foot unable to clear ground and getting caught. Increased verbal cues needed to keep rwx in middle of body and to get R LE to swing fully through.Pt assisted to chair at end of session with family present. Pt could continue to benefit from skilled PT to increase mobiity and strength.      Anticipated Discharge Disposition (PT): home with home health

## 2023-07-14 NOTE — PROGRESS NOTES
Gastroenterology   Inpatient Progress Note    Reason for Follow Up: Dysphagia, abnormal esophagram    Subjective  Interval History:   Patient is tolerating diet, no dysphagia, family has questions about GERD diet recommendations    Current Facility-Administered Medications:     aspirin EC tablet 81 mg, 81 mg, Oral, Daily, Alirio Mendez MD, 81 mg at 07/14/23 0942    budesonide-formoterol (SYMBICORT) 160-4.5 MCG/ACT inhaler 2 puff, 2 puff, Inhalation, BID - RT, Alirio Mendez MD, 2 puff at 07/14/23 0711    donepezil (ARICEPT) tablet 10 mg, 10 mg, Oral, Daily, Alirio Mendez MD, 10 mg at 07/14/23 0942    furosemide (LASIX) tablet 20 mg, 20 mg, Oral, Daily, Alirio Mendez MD, 20 mg at 07/14/23 0942    gabapentin (NEURONTIN) capsule 100 mg, 100 mg, Oral, TID, Alirio Mendez MD, 100 mg at 07/14/23 1713    guaiFENesin (MUCINEX) 12 hr tablet 600 mg, 600 mg, Oral, Q12H, Alirio Mendez MD, 600 mg at 07/14/23 0942    ipratropium-albuterol (DUO-NEB) nebulizer solution 3 mL, 3 mL, Nebulization, 4x Daily - RT, Nilam Walker, APRN, 3 mL at 07/14/23 1458    latanoprost (XALATAN) 0.005 % ophthalmic solution 1 drop, 1 drop, Both Eyes, Nightly, Alirio Mendez MD, 1 drop at 07/13/23 2203    losartan (COZAAR) tablet 100 mg, 100 mg, Oral, Daily, Alirio Mendez MD, 100 mg at 07/14/23 0942    memantine (NAMENDA) tablet 10 mg, 10 mg, Oral, BID, Alirio Mendez MD, 10 mg at 07/14/23 0942    metoprolol succinate XL (TOPROL-XL) 24 hr tablet 50 mg, 50 mg, Oral, Daily, Alirio Mendez MD, 50 mg at 07/14/23 0942    pantoprazole (PROTONIX) EC tablet 40 mg, 40 mg, Oral, BID AC, Alirio Mendez MD, 40 mg at 07/14/23 1713    piperacillin-tazobactam (ZOSYN) 3.375 g in iso-osmotic dextrose 50 ml (premix), 3.375 g, Intravenous, Q8H, Alirio Mendez MD, 3.375 g at 07/14/23 1511    primidone (MYSOLINE) tablet 50 mg, 50 mg, Oral, Nightly, Alirio Mendez MD, 50 mg at 07/13/23 2159    [COMPLETED] Insert Peripheral IV, , , Once **AND** sodium chloride 0.9 % flush 10 mL, 10  mL, Intravenous, PRN, Carmine Bourne MD    sodium chloride 0.9 % infusion, 30 mL/hr, Intravenous, Continuous PRN, Jimmy Amor MD, Last Rate: 30 mL/hr at 07/13/23 1445, 30 mL/hr at 07/13/23 1445    sodium chloride 7 % nebulizer solution nebulizer solution 4 mL, 4 mL, Nebulization, BID - RT, Nilam Walker, APRN, 4 mL at 07/14/23 0711  Review of Systems:               Review of systems could not be obtained due to  patient confusion.    Objective     Vital Signs  Temp:  [97.2 °F (36.2 °C)-98.4 °F (36.9 °C)] 97.2 °F (36.2 °C)  Heart Rate:  [72-85] 72  Resp:  [16] 16  BP: (127-160)/(65-81) 127/65  Body mass index is 22.92 kg/m².                  Physical Exam:              General: patient awake              Eyes: no scleral icterus              Skin: warm and dry, not jaundiced              Abdomen: soft, normal bowel sounds,               Psychiatric: FLat affect, calm                Results Review:                I reviewed the patient's new clinical results.    Results from last 7 days   Lab Units 07/14/23  0627 07/13/23  0722 07/12/23  0608   WBC 10*3/mm3 10.22 10.54 14.66*   HEMOGLOBIN g/dL 12.5* 11.3* 11.0*   HEMATOCRIT % 37.9 34.6* 32.9*   PLATELETS 10*3/mm3 269 253 237     Results from last 7 days   Lab Units 07/14/23  0627 07/13/23  0722 07/12/23  0608 07/11/23  0638 07/09/23  2141   SODIUM mmol/L 141 144 143 138 139   POTASSIUM mmol/L 4.1 3.9 4.0 4.2 4.4   CHLORIDE mmol/L 106 110* 108* 106 103   CO2 mmol/L 24.1 24.6 23.7 24.0 22.3   BUN mg/dL 21 24* 29* 34* 20   CREATININE mg/dL 1.03 0.97 1.09 1.18 1.04   CALCIUM mg/dL 8.7 9.0 9.0 9.2 9.3   BILIRUBIN mg/dL  --   --   --  0.4 0.2   ALK PHOS U/L  --   --   --  59 83   ALT (SGPT) U/L  --   --   --  9 16   AST (SGOT) U/L  --   --   --  13 17   GLUCOSE mg/dL 74 82 96 128* 134*     Results from last 7 days   Lab Units 07/09/23  2141   INR  1.04     No results found for: LIPASE    Radiology:  FL Esophagram Complete Single Contrast   Final Result      XR  Chest 1 View   Final Result   Background chronic interstitial lung disease and pulmonary fibrosis,   with suspected superimposed infiltrates,       This report was finalized on 7/9/2023 9:57 PM by Dr. Marisabel Soto M.D.              Assessment & Plan     Active Hospital Problems    Diagnosis     **Acute respiratory failure with hypoxia     Esophageal dysphagia        Assessment:  Reviewed EGD with hiatal hernia, esophageal stricture dilated and gastric polyps, biopsied  Solid food dysphagia and aspiration pneumonia  Abnormal esophagram      Plan:  Dietary and lifestyle modifications for hiatal hernia include maintaining upright position after eating, avoiding eating 3 hours prior to bedtime, smaller more frequent meals, chewing food completely, alternating liquid wash and cutting food into small bites  Continue PPI therapy  GI soft diet  GI will sign off, we will contact family with biopsy results from recent EGD      I discussed the patients findings and my recommendations with patient and family.           Nika BENAVIDES  [facility]  [address] [place] [address]

## 2023-07-14 NOTE — PROGRESS NOTES
[facility] INPATIENT PROGRESS NOTE         [address]    2023      PATIENT IDENTIFICATION:  Name: Brandon Yo ADMIT: 2023   : 1935  PCP: Mike Covarrubias MD    MRN: [id number] LOS: 5 days   AGE/SEX: 87 y.o. male  ROOM: [facility]                     LOS 5    Reason for visit: respiratory failure       SUBJECTIVE:      No new issues overnight.  Noted EGD results.      Objective   OBJECTIVE:    Vital Sign Min/Max for last 24 hours  Temp  Min: 97.5 °F (36.4 °C)  Max: 98.4 °F (36.9 °C)   BP  Min: 83/54  Max: 172/88   Pulse  Min: 69  Max: 91   Resp  Min: 11  Max: 20   SpO2  Min: 90 %  Max: 99 %   No data recorded   No data recorded                         Body mass index is 22.92 kg/m².    Intake/Output Summary (Last 24 hours) at 2023 0822  Last data filed at 2023 1649  Gross per 24 hour   Intake 60 ml   Output --   Net 60 ml           Exam:  GEN:  No distress, appears stated age  NECK:  No adenopathy, midline trachea  LUNGS: Normal chest on inspection, palpation and auscultation      Assessment     Scheduled meds:  aspirin, 81 mg, Oral, Daily  budesonide-formoterol, 2 puff, Inhalation, BID - RT  donepezil, 10 mg, Oral, Daily  furosemide, 20 mg, Oral, Daily  gabapentin, 100 mg, Oral, TID  guaiFENesin, 600 mg, Oral, Q12H  ipratropium-albuterol, 3 mL, Nebulization, 4x Daily - RT  latanoprost, 1 drop, Both Eyes, Nightly  losartan, 100 mg, Oral, Daily  memantine, 10 mg, Oral, BID  metoprolol succinate XL, 50 mg, Oral, Daily  pantoprazole, 40 mg, Oral, BID AC  perflutren (DEFINITY) in 0.9% NS 10 mL syringe, 2 mL, Intravenous, Once in imaging  piperacillin-tazobactam, 3.375 g, Intravenous, Q8H  predniSONE, 20 mg, Oral, Daily With Breakfast  primidone, 50 mg, Oral, Nightly  sodium chloride, 4 mL, Nebulization, BID - RT      IV meds:                      lactated ringers, 30 mL/hr, Last Rate: 75 mL/hr (23 1501)  sodium chloride, 30 mL/hr, Last Rate: 30 mL/hr (23  6956)      Data Review:  Results from last 7 days   Lab Units 07/14/23  0627 07/13/23  0722 07/12/23  0608 07/11/23  0638 07/09/23  2141   SODIUM mmol/L 141 144 143 138 139   POTASSIUM mmol/L 4.1 3.9 4.0 4.2 4.4   CHLORIDE mmol/L 106 110* 108* 106 103   CO2 mmol/L 24.1 24.6 23.7 24.0 22.3   BUN mg/dL 21 24* 29* 34* 20   CREATININE mg/dL 1.03 0.97 1.09 1.18 1.04   GLUCOSE mg/dL 74 82 96 128* 134*   CALCIUM mg/dL 8.7 9.0 9.0 9.2 9.3         Estimated Creatinine Clearance: 51.3 mL/min (by C-G formula based on SCr of 1.03 mg/dL).  Results from last 7 days   Lab Units 07/14/23  0627 07/13/23  0722 07/12/23  0608 07/11/23  0638 07/09/23  2141   WBC 10*3/mm3 10.22 10.54 14.66* 15.28* 10.51   HEMOGLOBIN g/dL 12.5* 11.3* 11.0* 11.4* 14.4   PLATELETS 10*3/mm3 269 253 237 242 295     Results from last 7 days   Lab Units 07/09/23  2141   INR  1.04     Results from last 7 days   Lab Units 07/11/23  0638 07/09/23  2141   ALT (SGPT) U/L 9 16   AST (SGOT) U/L 13 17     Results from last 7 days   Lab Units 07/09/23  2153   PH, ARTERIAL pH units 7.276*   PO2 ART mm Hg 81.0   PCO2, ARTERIAL mm Hg 53.8*   HCO3 ART mmol/L 25.0     Results from last 7 days   Lab Units 07/09/23  2141   PROCALCITONIN ng/mL 0.03   LACTATE mmol/L 1.6         No results found for: HGBA1C, POCGLU    CXR 7/9 reviewed          Microbiology reviewed              Active Hospital Problems    Diagnosis  POA    **Acute respiratory failure with hypoxia [J96.01]  Yes    Esophageal dysphagia [R13.19]  Unknown      Resolved Hospital Problems   No resolved problems to display.         ASSESSMENT:  Acute respiratory failure with hypoxia  Acute respiratory failure with hypercapnia  Aspiration pneumonitis versus pneumonia  ILD  Dysphagia  COPD/emphysema  Chronically elevated right hemidiaphragm  Hypertension  Dementia  Esophageal stenosis: s/p dilation  Hiatal hernia       PLAN:  Encourage pulmonary toilet.  Bronchodilators and saline nebulized to help with  clearance.  Elevate HOB for aspiration risk. Hopefully dilation of stenotic esophagus will help reduce this risk as well.   Antibiotic for aspiration PNA.  On room air.  Following peripherally for pulmonary issues.       David Mello MD  Pulmonary and Critical Care Medicine  [facility], [facility]  7/14/2023    08:22 EDT

## 2023-07-14 NOTE — PLAN OF CARE
Goal Outcome Evaluation:  Plan of Care Reviewed With: patient        Progress: improving  Outcome Evaluation: VSS, no complaints of pain.  Pt tolerating GI soft, fiber restricted diet with no evidence of aspirations.  Pills whole with water.  Plan is for pt to return home with family and home health.  GI signed off, possible discharge tomorrow.

## 2023-07-15 VITALS
RESPIRATION RATE: 20 BRPM | WEIGHT: 155.2 LBS | TEMPERATURE: 98.3 F | OXYGEN SATURATION: 96 % | SYSTOLIC BLOOD PRESSURE: 117 MMHG | DIASTOLIC BLOOD PRESSURE: 65 MMHG | HEART RATE: 75 BPM | HEIGHT: 69 IN | BODY MASS INDEX: 22.99 KG/M2

## 2023-07-15 LAB
ANION GAP SERPL CALCULATED.3IONS-SCNC: 12.5 MMOL/L (ref 5–15)
BASOPHILS # BLD AUTO: 0.06 10*3/MM3 (ref 0–0.2)
BASOPHILS NFR BLD AUTO: 0.6 % (ref 0–1.5)
BUN SERPL-MCNC: 20 MG/DL (ref 8–23)
BUN/CREAT SERPL: 18.7 (ref 7–25)
CALCIUM SPEC-SCNC: 8.8 MG/DL (ref 8.6–10.5)
CHLORIDE SERPL-SCNC: 104 MMOL/L (ref 98–107)
CO2 SERPL-SCNC: 23.5 MMOL/L (ref 22–29)
CREAT SERPL-MCNC: 1.07 MG/DL (ref 0.76–1.27)
DEPRECATED RDW RBC AUTO: 40.5 FL (ref 37–54)
EGFRCR SERPLBLD CKD-EPI 2021: 67.6 ML/MIN/1.73
EOSINOPHIL # BLD AUTO: 0.42 10*3/MM3 (ref 0–0.4)
EOSINOPHIL NFR BLD AUTO: 4.1 % (ref 0.3–6.2)
ERYTHROCYTE [DISTWIDTH] IN BLOOD BY AUTOMATED COUNT: 12.4 % (ref 12.3–15.4)
GLUCOSE SERPL-MCNC: 81 MG/DL (ref 65–99)
HCT VFR BLD AUTO: 36.7 % (ref 37.5–51)
HGB BLD-MCNC: 12.1 G/DL (ref 13–17.7)
IMM GRANULOCYTES # BLD AUTO: 0.09 10*3/MM3 (ref 0–0.05)
IMM GRANULOCYTES NFR BLD AUTO: 0.9 % (ref 0–0.5)
LYMPHOCYTES # BLD AUTO: 1.36 10*3/MM3 (ref 0.7–3.1)
LYMPHOCYTES NFR BLD AUTO: 13.1 % (ref 19.6–45.3)
MCH RBC QN AUTO: 29.5 PG (ref 26.6–33)
MCHC RBC AUTO-ENTMCNC: 33 G/DL (ref 31.5–35.7)
MCV RBC AUTO: 89.5 FL (ref 79–97)
MONOCYTES # BLD AUTO: 0.98 10*3/MM3 (ref 0.1–0.9)
MONOCYTES NFR BLD AUTO: 9.5 % (ref 5–12)
NEUTROPHILS NFR BLD AUTO: 7.44 10*3/MM3 (ref 1.7–7)
NEUTROPHILS NFR BLD AUTO: 71.8 % (ref 42.7–76)
NRBC BLD AUTO-RTO: 0 /100 WBC (ref 0–0.2)
PLATELET # BLD AUTO: 250 10*3/MM3 (ref 140–450)
PMV BLD AUTO: 10.4 FL (ref 6–12)
POTASSIUM SERPL-SCNC: 3.6 MMOL/L (ref 3.5–5.2)
RBC # BLD AUTO: 4.1 10*6/MM3 (ref 4.14–5.8)
SODIUM SERPL-SCNC: 140 MMOL/L (ref 136–145)
WBC NRBC COR # BLD: 10.35 10*3/MM3 (ref 3.4–10.8)

## 2023-07-15 PROCEDURE — 85025 COMPLETE CBC W/AUTO DIFF WBC: CPT | Performed by: HOSPITALIST

## 2023-07-15 PROCEDURE — 80048 BASIC METABOLIC PNL TOTAL CA: CPT | Performed by: HOSPITALIST

## 2023-07-15 PROCEDURE — 94799 UNLISTED PULMONARY SVC/PX: CPT

## 2023-07-15 PROCEDURE — 94760 N-INVAS EAR/PLS OXIMETRY 1: CPT

## 2023-07-15 PROCEDURE — 25010000002 PIPERACILLIN SOD-TAZOBACTAM PER 1 G: Performed by: HOSPITALIST

## 2023-07-15 PROCEDURE — 94664 DEMO&/EVAL PT USE INHALER: CPT

## 2023-07-15 RX ORDER — IPRATROPIUM BROMIDE AND ALBUTEROL SULFATE 2.5; .5 MG/3ML; MG/3ML
3 SOLUTION RESPIRATORY (INHALATION) 4 TIMES DAILY PRN
Qty: 120 ML | Refills: 0 | Status: SHIPPED | OUTPATIENT
Start: 2023-07-15

## 2023-07-15 RX ORDER — LOSARTAN POTASSIUM 100 MG/1
100 TABLET ORAL DAILY
Qty: 30 TABLET | Refills: 0 | Status: SHIPPED | OUTPATIENT
Start: 2023-07-16 | End: 2023-08-15

## 2023-07-15 RX ADMIN — ASPIRIN 81 MG: 81 TABLET, COATED ORAL at 08:23

## 2023-07-15 RX ADMIN — METOPROLOL SUCCINATE 50 MG: 50 TABLET, FILM COATED, EXTENDED RELEASE ORAL at 08:22

## 2023-07-15 RX ADMIN — IPRATROPIUM BROMIDE AND ALBUTEROL SULFATE 3 ML: .5; 3 SOLUTION RESPIRATORY (INHALATION) at 11:27

## 2023-07-15 RX ADMIN — MEMANTINE HYDROCHLORIDE 10 MG: 10 TABLET, FILM COATED ORAL at 08:23

## 2023-07-15 RX ADMIN — PANTOPRAZOLE SODIUM 40 MG: 40 TABLET, DELAYED RELEASE ORAL at 06:06

## 2023-07-15 RX ADMIN — LOSARTAN POTASSIUM 100 MG: 100 TABLET, FILM COATED ORAL at 08:22

## 2023-07-15 RX ADMIN — GABAPENTIN 100 MG: 100 CAPSULE ORAL at 08:23

## 2023-07-15 RX ADMIN — Medication 4 ML: at 07:48

## 2023-07-15 RX ADMIN — BUDESONIDE AND FORMOTEROL FUMARATE DIHYDRATE 2 PUFF: 160; 4.5 AEROSOL RESPIRATORY (INHALATION) at 07:45

## 2023-07-15 RX ADMIN — GUAIFENESIN 600 MG: 600 TABLET, EXTENDED RELEASE ORAL at 08:22

## 2023-07-15 RX ADMIN — PIPERACILLIN SODIUM AND TAZOBACTAM SODIUM 3.38 G: 3; .375 INJECTION, SOLUTION INTRAVENOUS at 06:04

## 2023-07-15 RX ADMIN — FUROSEMIDE 20 MG: 20 TABLET ORAL at 08:22

## 2023-07-15 RX ADMIN — DONEPEZIL HYDROCHLORIDE 10 MG: 10 TABLET, FILM COATED ORAL at 08:22

## 2023-07-15 RX ADMIN — IPRATROPIUM BROMIDE AND ALBUTEROL SULFATE 3 ML: .5; 3 SOLUTION RESPIRATORY (INHALATION) at 07:45

## 2023-07-15 NOTE — PROGRESS NOTES
"                                              LOS: 6 days   Patient Care Team:  Mike Covarrubias MD as PCP - General (Family Medicine)  Bonita Sanchez MD as Consulting Physician (Orthopedic Surgery)    Chief Complaint:  F/up pneumonia, ILD, respiratory failure    Subjective   Interval History  I reviewed the admission note, progress notes, PMH, PSH, Family hx, social history, imagings and prior records on this admission, summarized the finding in my note and formulated a transition of care plan.    Improved dyspnea.  No difficulty breathing at rest.  On RA at rest.  Minimal nonproductive cough.    REVIEW OF SYSTEMS:   CARDIOVASCULAR: No chest pain, chest pressure or chest discomfort. No palpitations or edema.   GASTROINTESTINAL: No anorexia, nausea, vomiting or diarrhea. No abdominal pain.  CONSTITUTIONAL: No fever or chills.     Ventilator/Non-Invasive Ventilation Settings (From admission, onward)      None                  Physical Exam:     Vital Signs  Temp:  [97.4 °F (36.3 °C)-98.3 °F (36.8 °C)] 98.3 °F (36.8 °C)  Heart Rate:  [63-78] 75  Resp:  [16-20] 20  BP: (117-151)/(65-78) 117/65    Intake/Output Summary (Last 24 hours) at 7/15/2023 1317  Last data filed at 7/15/2023 0823  Gross per 24 hour   Intake 480 ml   Output --   Net 480 ml     Flowsheet Rows      Flowsheet Row First Filed Value   Admission Height 175.3 cm (69\") Documented at 07/09/2023 2135   Admission Weight 70.4 kg (155 lb 3.3 oz) Documented at 07/09/2023 2135            PPE used per hospital policy    General Appearance:   Alert, cooperative, in no acute distress   ENMT:  Mallampati score 3, moist mucous membrane   Eyes:  Pupils equal and reactive to light. EOMI   Neck:    Trachea midline. No thyromegaly.   Lungs:   Bilateral basal crackles more prominent on the left.  Diminished air entry overall.  No audible wheezing.    Heart:   Regular rhythm and normal rate, normal S1 and S2, no         murmur   Skin:   No rash or ecchymosis "   Abdomen:    Obese. Soft. No tenderness. No HSM.   Neuro/psych:  Conscious, alert, oriented x3. Strength 5/5 in upper and lower  ext.  Appropriate mood and affect   Extremities:  No cyanosis, clubbing or edema.  Warm extremities and well-perfused          Results Review:        Results from last 7 days   Lab Units 07/15/23  0730 07/14/23 0627 07/13/23  0722   SODIUM mmol/L 140 141 144   POTASSIUM mmol/L 3.6 4.1 3.9   CHLORIDE mmol/L 104 106 110*   CO2 mmol/L 23.5 24.1 24.6   BUN mg/dL 20 21 24*   CREATININE mg/dL 1.07 1.03 0.97   GLUCOSE mg/dL 81 74 82   CALCIUM mg/dL 8.8 8.7 9.0     Results from last 7 days   Lab Units 07/09/23  2353 07/09/23  2141   HSTROP T ng/L 30* 29*     Results from last 7 days   Lab Units 07/15/23  0730 07/14/23  0627 07/13/23  0722   WBC 10*3/mm3 10.35 10.22 10.54   HEMOGLOBIN g/dL 12.1* 12.5* 11.3*   HEMATOCRIT % 36.7* 37.9 34.6*   PLATELETS 10*3/mm3 250 269 253     Results from last 7 days   Lab Units 07/09/23  2141   INR  1.04     Results from last 7 days   Lab Units 07/10/23  1309   PROBNP pg/mL 2,433.0*                   Results from last 7 days   Lab Units 07/09/23  2153   PH, ARTERIAL pH units 7.276*   PCO2, ARTERIAL mm Hg 53.8*   PO2 ART mm Hg 81.0   FLOW RATE lpm 15   MODALITY  NRB   O2 SATURATION CALC % 93.9         I reviewed the patient's new clinical results.        Medication Review:   aspirin, 81 mg, Oral, Daily  budesonide-formoterol, 2 puff, Inhalation, BID - RT  donepezil, 10 mg, Oral, Daily  furosemide, 20 mg, Oral, Daily  gabapentin, 100 mg, Oral, TID  guaiFENesin, 600 mg, Oral, Q12H  ipratropium-albuterol, 3 mL, Nebulization, 4x Daily - RT  latanoprost, 1 drop, Both Eyes, Nightly  losartan, 100 mg, Oral, Daily  memantine, 10 mg, Oral, BID  metoprolol succinate XL, 50 mg, Oral, Daily  pantoprazole, 40 mg, Oral, BID AC  primidone, 50 mg, Oral, Nightly  sodium chloride, 4 mL, Nebulization, BID - RT        sodium chloride, 30 mL/hr, Last Rate: 30 mL/hr (07/13/23  6235)        Diagnostic imaging:  CXR 7/9/2023: Elevated right hemidiaphragm.  Increased interstitial infiltrates bilaterally at the bases.  Ersonally and independently reviewed the following images:      Assessment     Acute respiratory failure with hypoxia  Acute respiratory failure with hypercapnia  Aspiration pneumonitis versus pneumonia  ILD  Dysphagia  COPD/emphysema  Chronically elevated right hemidiaphragm  Hypertension  Dementia  Esophageal stenosis: s/p dilation  Hiatal hernia     All problems new to me    Plan       Refill DuoNeb 4 times a day as needed.  He has a nebulizer at home but does not have the meds  Weaned off oxygen at rest.  He has oxygen to use as needed at home.  Aspiration precaution  Mucinex cough  Could consider sniff test as outpatient to evaluate for right diaphragm paralysis    Discussed with family and nursing staff    Hal Damico MD  07/15/23  13:17 EDT          This note was dictated utilizing Dragon dictation

## 2023-07-15 NOTE — PROGRESS NOTES
"Daily progress note    Primary care physician  Dr. PARIKH    Chief complaint  Doing better with no new complaints and wants to go home.  Patient family at bedside.    History of present illness  87-year-old white male with history of COPD dementia hypertension congestive heart failure and gastroesophageal reflux disease brought to the emergency room by the family with respiratory distress shortness of breath and found to be hypoxic.  Patient evaluated in ER and work-up revealed acute hypoxic respiratory failure with COPD exhibition and aspiration pneumonitis admitted for management.  Patient is demented unable to give detailed history most of the history obtained from the chart family nursing staff and old record.  Patient is DNR per his wishes.     REVIEW OF SYSTEMS  Unremarkable except generalized weakness     PHYSICAL EXAM  Blood pressure 117/65, pulse 75, temperature 98.3 °F (36.8 °C), temperature source Oral, resp. rate 20, height 175.3 cm (69\"), weight 70.4 kg (155 lb 3.3 oz), SpO2 (!) 85 %.    GENERAL: Awake and alert in mild distress and pleasantly confused  HENT: NCAT: nares patent: Neck supple  EYES: no scleral icterus  CV: regular rhythm, tachycardic   RESPIRATORY: Moderate respiratory distress with rhonchi in all lung fields  ABDOMEN: soft, NTND: Bowel sounds positive  MUSCULOSKELETAL: no deformity  NEURO: alert with nonfocal neuro exam  PSYCH:  calm, cooperative  SKIN: warm, dry     LAB RESULTS  Lab Results (last 24 hours)       Procedure Component Value Units Date/Time    Basic Metabolic Panel [[id number]]  (Normal) Collected: 07/15/23 0730    Specimen: Blood Updated: 07/15/23 0811     Glucose 81 mg/dL      BUN 20 mg/dL      Creatinine 1.07 mg/dL      Sodium 140 mmol/L      Potassium 3.6 mmol/L      Chloride 104 mmol/L      CO2 23.5 mmol/L      Calcium 8.8 mg/dL      BUN/Creatinine Ratio 18.7     Anion Gap 12.5 mmol/L      eGFR 67.6 mL/min/1.73     Narrative:      GFR Normal >60  Chronic Kidney Disease " <60  Kidney Failure <15    The GFR formula is only valid for adults with stable renal function between ages 18 and 70.    CBC & Differential [[id number]]  (Abnormal) Collected: 07/15/23 0730    Specimen: Blood Updated: 07/15/23 0755    Narrative:      The following orders were created for panel order CBC & Differential.  Procedure                               Abnormality         Status                     ---------                               -----------         ------                     CBC Auto Differential[[id number]]        Abnormal            Final result                 Please view results for these tests on the individual orders.    CBC Auto Differential [[id number]]  (Abnormal) Collected: 07/15/23 0730    Specimen: Blood Updated: 07/15/23 0755     WBC 10.35 10*3/mm3      RBC 4.10 10*6/mm3      Hemoglobin 12.1 g/dL      Hematocrit 36.7 %      MCV 89.5 fL      MCH 29.5 pg      MCHC 33.0 g/dL      RDW 12.4 %      RDW-SD 40.5 fl      MPV 10.4 fL      Platelets 250 10*3/mm3      Neutrophil % 71.8 %      Lymphocyte % 13.1 %      Monocyte % 9.5 %      Eosinophil % 4.1 %      Basophil % 0.6 %      Immature Grans % 0.9 %      Neutrophils, Absolute 7.44 10*3/mm3      Lymphocytes, Absolute 1.36 10*3/mm3      Monocytes, Absolute 0.98 10*3/mm3      Eosinophils, Absolute 0.42 10*3/mm3      Basophils, Absolute 0.06 10*3/mm3      Immature Grans, Absolute 0.09 10*3/mm3      nRBC 0.0 /100 WBC     Blood Culture - Blood, Arm, Right [[id number]]  (Normal) Collected: 07/09/23 2249    Specimen: Blood from Arm, Right Updated: 07/14/23 2301     Blood Culture No growth at 5 days    Blood Culture - Blood, Arm, Right [[id number]]  (Normal) Collected: 07/09/23 2156    Specimen: Blood from Arm, Right Updated: 07/14/23 2200     Blood Culture No growth at 5 days    Narrative:      Less than seven (7) mL's of blood was collected.  Insufficient quantity may yield false negative results.          Imaging Results (Last 24 Hours)       ** No  results found for the last 24 hours. **          ECG 12 Lead Dyspnea: Patient Communication    Scan on 7/9/2023 2134 by [facility], [place]: ECG 12-LEAD         Author: -- Service: -- Author Type: --   Filed: Date of Service: Creation Time:   Status: (Other)   HEART RATE= 79  bpm  RR Interval= 759  ms  WA Interval=   ms  P Horizontal Axis=   deg  P Front Axis=   deg  QRSD Interval= 137  ms  QT Interval= 421  ms  QRS Axis= -100  deg  T Wave Axis= 18  deg  - ABNORMAL ECG -  Rhythm analysis indeterminate due to severe baseline artifact          Current Facility-Administered Medications:     aspirin EC tablet 81 mg, 81 mg, Oral, Daily, Alirio Mendez MD, 81 mg at 07/15/23 0823    budesonide-formoterol (SYMBICORT) 160-4.5 MCG/ACT inhaler 2 puff, 2 puff, Inhalation, BID - RT, Alirio Mendez MD, 2 puff at 07/15/23 0745    donepezil (ARICEPT) tablet 10 mg, 10 mg, Oral, Daily, Alirio Mendez MD, 10 mg at 07/15/23 0822    furosemide (LASIX) tablet 20 mg, 20 mg, Oral, Daily, Alirio Mendez MD, 20 mg at 07/15/23 0822    gabapentin (NEURONTIN) capsule 100 mg, 100 mg, Oral, TID, Alirio Mendez MD, 100 mg at 07/15/23 0823    guaiFENesin (MUCINEX) 12 hr tablet 600 mg, 600 mg, Oral, Q12H, Alirio Mendez MD, 600 mg at 07/15/23 0822    ipratropium-albuterol (DUO-NEB) nebulizer solution 3 mL, 3 mL, Nebulization, 4x Daily - RT, Nilam Walker, APRN, 3 mL at 07/15/23 1127    latanoprost (XALATAN) 0.005 % ophthalmic solution 1 drop, 1 drop, Both Eyes, Nightly, Alirio Mendez MD, 1 drop at 07/14/23 2042    losartan (COZAAR) tablet 100 mg, 100 mg, Oral, Daily, Alirio Mendez MD, 100 mg at 07/15/23 0822    memantine (NAMENDA) tablet 10 mg, 10 mg, Oral, BID, Alirio Mendez MD, 10 mg at 07/15/23 0823    metoprolol succinate XL (TOPROL-XL) 24 hr tablet 50 mg, 50 mg, Oral, Daily, Alirio Mendez MD, 50 mg at 07/15/23 0822    pantoprazole (PROTONIX) EC tablet 40 mg, 40 mg, Oral, BID AC, Alirio Mendez MD, 40 mg at 07/15/23 0606    primidone (MYSOLINE)  tablet 50 mg, 50 mg, Oral, Nightly, Rodríguez Mendez MD, 50 mg at 07/14/23 2042    [COMPLETED] Insert Peripheral IV, , , Once **AND** sodium chloride 0.9 % flush 10 mL, 10 mL, Intravenous, PRN, Carmine Bourne MD    sodium chloride 0.9 % infusion, 30 mL/hr, Intravenous, Continuous PRN, Jimmy Amor MD, Last Rate: 30 mL/hr at 07/13/23 1445, 30 mL/hr at 07/13/23 1445    sodium chloride 7 % nebulizer solution nebulizer solution 4 mL, 4 mL, Nebulization, BID - RT, Nilam Walker, APRN, 4 mL at 07/15/23 0748     ASSESSMENT  Acute hypoxic hypercapnic respiratory failure  Acute exacerbation of COPD  Esophageal stenosis with esophagitis and hiatal hernia  Aspiration pneumonitis completed antibiotics  Dysphagia with esophageal dysmotility  Severe dementia  Hypertension  Osteoarthritis  Gastroesophageal reflux disease    PLAN  Discharge home with family support and home health  Discharge summary dictated    RODRÍGUEZ MENDEZ MD    Copied text in this note has been reviewed and is accurate as of 07/15/23

## 2023-07-15 NOTE — PLAN OF CARE
Goal Outcome Evaluation:  Plan of Care Reviewed With: patient, spouse, daughter        Progress: improving  Outcome Evaluation: Patient discharged to home with Home Health.

## 2023-07-15 NOTE — CASE MANAGEMENT/SOCIAL WORK
Case Management Discharge Note      Final Note: DC home w/HH    Provided Post Acute Provider List?: Yes  Post Acute Provider List: Home Health  Delivered To: Support Person  Support Person: Wife  Method of Delivery: In person    Selected Continued Care - Discharged on 7/15/2023 Admission date: 7/9/2023 - Discharge disposition: Home or Self Care      Destination    No services have been selected for the patient.                Durable Medical Equipment    No services have been selected for the patient.                Dialysis/Infusion    No services have been selected for the patient.                Home Medical Care Coordination complete.      Service Provider Selected Services Address Phone Fax Patient Preferred    [facility] - [facility] [address]  [place] [address] [address] [phone] --              Therapy    No services have been selected for the patient.                Community Resources    No services have been selected for the patient.                Community & DME    No services have been selected for the patient.                         Final Discharge Disposition Code: 06 - home with home health care

## 2023-07-15 NOTE — DISCHARGE SUMMARY
Discharge summary    Date of admission 7/9/2023  Date of discharge 7/15/2023    Final diagnosis  Acute hypoxic hypercapnic respiratory failure resolved  Acute exacerbation of COPD resolved  Esophageal stenosis with esophagitis and hiatal hernia  Aspiration pneumonitis completed antibiotics  Dysphagia with esophageal dysmotility  Severe dementia  Hypertension  Osteoarthritis  Gastroesophageal reflux disease    Discharge medications  \  Current Facility-Administered Medications:     aspirin EC tablet 81 mg, 81 mg, Oral, Daily, Alirio Mendez MD, 81 mg at 07/15/23 0823    budesonide-formoterol (SYMBICORT) 160-4.5 MCG/ACT inhaler 2 puff, 2 puff, Inhalation, BID - RT, Alirio Mendez MD, 2 puff at 07/15/23 0745    donepezil (ARICEPT) tablet 10 mg, 10 mg, Oral, Daily, Alirio Mendez MD, 10 mg at 07/15/23 0822    furosemide (LASIX) tablet 20 mg, 20 mg, Oral, Daily, Alirio Mendez MD, 20 mg at 07/15/23 0822    gabapentin (NEURONTIN) capsule 100 mg, 100 mg, Oral, TID, Alirio Mendez MD, 100 mg at 07/15/23 0823    guaiFENesin (MUCINEX) 12 hr tablet 600 mg, 600 mg, Oral, Q12H, Alirio Mendez MD, 600 mg at 07/15/23 0822    ipratropium-albuterol (DUO-NEB) nebulizer solution 3 mL, 3 mL, Nebulization, 4x Daily - RT, Nilam Walker J, APRN, 3 mL at 07/15/23 1127    latanoprost (XALATAN) 0.005 % ophthalmic solution 1 drop, 1 drop, Both Eyes, Nightly, Alirio Mendez MD, 1 drop at 07/14/23 2042    losartan (COZAAR) tablet 100 mg, 100 mg, Oral, Daily, Alirio Mendez MD, 100 mg at 07/15/23 0822    memantine (NAMENDA) tablet 10 mg, 10 mg, Oral, BID, Alirio Mendez MD, 10 mg at 07/15/23 0823    metoprolol succinate XL (TOPROL-XL) 24 hr tablet 50 mg, 50 mg, Oral, Daily, Alirio Mendez MD, 50 mg at 07/15/23 0822    pantoprazole (PROTONIX) EC tablet 40 mg, 40 mg, Oral, BID AC, Alirio Mendez MD, 40 mg at 07/15/23 0606    primidone (MYSOLINE) tablet 50 mg, 50 mg, Oral, Nightly, Alirio Mendez MD, 50 mg at 07/14/23 2042    [COMPLETED] Insert Peripheral IV,  , , Once **AND** sodium chloride 0.9 % flush 10 mL, 10 mL, Intravenous, PRN, Carmine Bourne MD    sodium chloride 0.9 % infusion, 30 mL/hr, Intravenous, Continuous PRN, Jimmy Amor MD, Last Rate: 30 mL/hr at 07/13/23 1445, 30 mL/hr at 07/13/23 1445    sodium chloride 7 % nebulizer solution nebulizer solution 4 mL, 4 mL, Nebulization, BID - RT, Nilam Walker, APRN, 4 mL at 07/15/23 0748    Consults obtained  Pulmonary  Gastroenterology  Nutrition    Procedures  Upper endoscopy which revealed esophagitis hiatal hernia and esophageal stenosis    Hospital course  87-year white male with history of COPD hypertension congestive heart failure dementia and gastroesophageal disease admitted to emergency room with respiratory distress shortness of breath and found to be hypoxic.  Patient work-up in ER revealed aspiration pneumonitis admitted for management.  Patient admitted treated with supplemental oxygen nebulizer empiric antibiotics and further evaluated by speech pathology for dysphagia which revealed esophageal dysmotility and further evaluated by gastroenterology and today EGD which revealed esophageal stenosis esophagitis and dilation performed.  Post EGD he is tolerating diet and he has completed 5 days of IV antibiotics and back to baseline.  Patient does have home oxygen which she removed as needed.  Patient wants to go home with family support and home health which is arranged.  Patient cleared for discharge.    Discharge diet regular    Activity as tolerated    Medication as above    Follow-up with primary doctor in 1 week and follow-up with pulmonary and gastroenterology per the instruction and take medication as directed.    RODRÍGUEZ ADAMS MD

## 2023-07-16 NOTE — PROGRESS NOTES
"Enter Query Response Below      Query Response:       Chronic diastolic CHF Electronically signed by Alirio Mendez MD, 23, 11:45 AM EDT.         If applicable, please update the problem list.   Patient: Brandon Yo        : 1935  Account: [id number]           Admit Date: 2023        How to Respond to this query:       a. Click New Note     b. Answer query within the yellow box.                c. Update the Problem List, if applicable.      If you have any questions about this query contact me at: [email or web address]    Dr. Mendez,     88 yo admitted 23 for \"Acute hypoxic hypercapnic respiratory failure and Aspiration pneumonitis versus aspiration pneumonia\" per History and Physical.   Risk Factors: \"congestive heart failure and hypertension\" per History and Physical.   Clinical indicators: ECHO 7/10/23 \"left ventricular EF = 70.5%.  Left ventricular diastolic function is consistent with (grade Ia w/high LAP) impaired relaxation.\"  Treatment: Aspirin -7/15/23. Lasix ordered held until -7/15/23 due to low BP.     After study, can heart failure be further specified as    Chronic diastolic HF  Other (please specify)____________  Unable to specify    By submitting this query, we are merely seeking further clarification of documentation to accurately reflect all conditions that you are monitoring, evaluating, treating or that extend the hospitalization or utilize additional resources of care. Please utilize your independent clinical judgment when addressing the question(s) above.     This query and your response, once completed, will be entered into the legal medical record.    Sincerely,  Sofia WHEAT RN CCDS  System Director Clinical Documentation Integrity Program     "

## 2023-07-25 ENCOUNTER — READMISSION MANAGEMENT (OUTPATIENT)
Dept: CALL CENTER | Facility: HOSPITAL | Age: 88
End: 2023-07-25
Payer: MEDICARE

## 2023-07-25 NOTE — OUTREACH NOTE
COPD/PN Week 2 Survey      Flowsheet Row Responses   [facility] patient discharged from? [facility]   Does the patient have one of the following disease processes/diagnoses(primary or secondary)? COPD   Week 2 attempt successful? Yes   Call start time 1655   Call end time 1701   Discharge diagnosis Acute exacerbation of COPD   Person spoke with today (if not patient) and relationship tatyana Sims reviewed with patient/caregiver? Yes   Is the patient having any side effects they believe may be caused by any medication additions or changes? No   Does the patient have all medications ordered at discharge? Yes   Is the patient taking all medications as directed (includes completed medication regime)? Yes   Does the patient have a primary care provider?  Yes   Does the patient have an appointment with their PCP or specialist within 7 days of discharge? Yes   Has the patient kept scheduled appointments due by today? Yes   What is the Home health agency?  [facility]   Has home health visited the patient within 72 hours of discharge? Yes   Pulse Ox monitoring Intermittent   Pulse Ox device source Patient   O2 Sat comments 93-94% on RA, 97-98% on 1 L O2, uses O2 prn   O2 Sat: education provided Sat levels, When to seek care   Psychosocial issues? No   Comments has wound on foot, is followed by wound clinic   Did the patient receive a copy of their discharge instructions? Yes   Nursing interventions Reviewed instructions with patient   What is the patient's perception of their health status since discharge? Improving   Nursing Interventions Nurse provided patient education   If the patient is a current smoker, are they able to teach back resources for cessation? Not a smoker   Is the patient/caregiver able to teach back the hierarchy of who to call/visit for symptoms/problems? PCP, Specialist, Home health nurse, Urgent Care, ED, 911 Yes   Is the patient able to teach back COPD  zones? Yes   Patient reports what zone on this call? Green Zone   Green Zone Reports doing well, Breathing without shortness of breath, Usual activity and exercise level, Usual amounts of cough and phlegm/mucous, Slept well last night, Appetite is good   Green Zone interventions: Take daily medications, Use oxygen as prescribed, Continue regular exercise/diet plan   Is the patient/caregiver able to teach back signs and symptoms of worsening condition: Fever/chills, Shortness of breath, Chest pain   Is the patient/caregiver able to teach back importance of completing antibiotic course of treatment? Yes   Week 2 call completed? Yes   Call end time 1701            Celina JO - Registered Nurse

## 2023-07-28 ENCOUNTER — OFFICE VISIT (OUTPATIENT)
Dept: WOUND CARE | Facility: HOSPITAL | Age: 88
End: 2023-07-28
Payer: MEDICARE

## 2023-07-28 PROCEDURE — G0463 HOSPITAL OUTPT CLINIC VISIT: HCPCS

## 2023-08-02 ENCOUNTER — READMISSION MANAGEMENT (OUTPATIENT)
Dept: CALL CENTER | Facility: HOSPITAL | Age: 88
End: 2023-08-02
Payer: MEDICARE

## 2023-08-14 ENCOUNTER — TRANSCRIBE ORDERS (OUTPATIENT)
Dept: ADMINISTRATIVE | Facility: HOSPITAL | Age: 88
End: 2023-08-14
Payer: MEDICARE

## 2023-08-14 DIAGNOSIS — I70.213 ATHEROSCLEROSIS OF NATIVE ARTERIES OF EXTREMITIES WITH INTERMITTENT CLAUDICATION, BILATERAL LEGS: Primary | ICD-10-CM

## 2023-08-16 ENCOUNTER — HOSPITAL ENCOUNTER (OUTPATIENT)
Dept: CT IMAGING | Facility: HOSPITAL | Age: 88
Discharge: HOME | End: 2023-08-16
Admitting: SURGERY
Payer: MEDICARE

## 2023-08-16 ENCOUNTER — HOSPITAL ENCOUNTER (OUTPATIENT)
Dept: CARDIOLOGY | Facility: HOSPITAL | Age: 88
End: 2023-08-16
Payer: MEDICARE

## 2023-08-16 DIAGNOSIS — I70.213 ATHEROSCLEROSIS OF NATIVE ARTERIES OF EXTREMITIES WITH INTERMITTENT CLAUDICATION, BILATERAL LEGS: ICD-10-CM

## 2023-08-16 LAB — CREAT BLDA-MCNC: 1.4 MG/DL (ref 0.6–1.3)

## 2023-08-16 PROCEDURE — 82565 ASSAY OF CREATININE: CPT

## 2023-08-16 PROCEDURE — 25510000001 IOPAMIDOL PER 1 ML: Performed by: SURGERY

## 2023-08-16 PROCEDURE — 75635 CT ANGIO ABDOMINAL ARTERIES: CPT

## 2023-08-16 RX ADMIN — IOPAMIDOL 95 ML: 755 INJECTION, SOLUTION INTRAVENOUS at 14:33

## 2023-08-17 ENCOUNTER — OFFICE VISIT (OUTPATIENT)
Dept: WOUND CARE | Facility: HOSPITAL | Age: 88
End: 2023-08-17
Payer: MEDICARE

## 2023-08-31 ENCOUNTER — OFFICE VISIT (OUTPATIENT)
Dept: WOUND CARE | Facility: HOSPITAL | Age: 88
End: 2023-08-31
Payer: MEDICARE

## 2023-09-22 PROBLEM — R06.03 RESPIRATORY DISTRESS: Status: ACTIVE | Noted: 2023-01-01

## 2023-09-22 NOTE — PLAN OF CARE
Goal Outcome Evaluation:  Plan of Care Reviewed With: patient        Progress: no change  Outcome Evaluation: patient was transfered from [facility] for acute respiratory distress. MD was informed of patient arrival. Currently on 4L oxygen, NSR on the monitor. vitals stable will continued to monitor.

## 2023-09-22 NOTE — THERAPY EVALUATION
Patient Name: Brandon Yo  : 1935    MRN: [id number]                              Today's Date: 2023       Admit Date: 2023    Visit Dx:     ICD-10-CM ICD-9-CM   1. Respiratory distress  R06.03 786.09     Patient Active Problem List   Diagnosis    Acute respiratory failure with hypoxia    Esophageal dysphagia    Respiratory distress     Past Medical History:   Diagnosis Date    Acid reflux     Aspiration into respiratory tract     Chronic right shoulder pain     H/O cervical discectomy     20 years prior    Hypertension      Past Surgical History:   Procedure Laterality Date    BACK SURGERY      ENDOSCOPY N/A 2023    Procedure: ESOPHAGOGASTRODUODENOSCOPY WITH BIOPSY AND BALLOON DILATATION 15-16.5MM;  Surgeon: Jimmy Amor MD;  Location: [facility] ENDOSCOPY;  Service: Gastroenterology;  Laterality: N/A;  DYSPHAGIA  POST: GASTRIC POLYPS; ESOPHAGEAL STRICTURE; HIATAL HERNIA; GE JUNCTION INFLAMMATION      General Information       Row Name 23 1524          Physical Therapy Time and Intention    Document Type evaluation  -     Mode of Treatment individual therapy;physical therapy  -       Row Name 23 1524          General Information    Patient Profile Reviewed yes  -SM     Prior Level of Function min assist:  Wife assists with ADLs  -     Existing Precautions/Restrictions fall  -       Row Name 23 1524          Living Environment    People in Home spouse  -       Row Name 23 1524          Home Main Entrance    Number of Stairs, Main Entrance none  -       Row Name 23 1524          Cognition    Orientation Status (Cognition) oriented to;person;place;verbal cues/prompts needed for orientation  -       Row Name 23 1524          Safety Issues, Functional Mobility    Safety Issues Affecting Function (Mobility) problem-solving;sequencing abilities  -     Impairments Affecting Function (Mobility) strength;endurance/activity  tolerance;balance;cognition  -               User Key  (r) = Recorded By, (t) = Taken By, (c) = Cosigned By      Initials Name Provider Type    Lexus Guerrero PT Physical Therapist                   Mobility       Row Name 09/22/23 1525          Bed Mobility    Bed Mobility supine-sit;sit-supine  -SM     Supine-Sit [place] (Bed Mobility) contact guard  -     Sit-Supine [place] (Bed Mobility) contact guard  -     Assistive Device (Bed Mobility) bed rails;head of bed elevated  -       Row Name 09/22/23 1525          Sit-Stand Transfer    Sit-Stand [place] (Transfers) contact guard;minimum assist (75% patient effort);verbal cues  -     Assistive Device (Sit-Stand Transfers) walker, front-wheeled  -       Row Name 09/22/23 1525          Gait/Stairs (Locomotion)    [place]anks Level (Gait) contact guard  -     Assistive Device (Gait) walker, front-wheeled  -     Distance in Feet (Gait) 15ft 2x  -SM     Deviations/Abnormal Patterns (Gait) grey decreased;antalgic  -     Bilateral Gait Deviations forward flexed posture;heel strike decreased  -SM     Left Sided Gait Deviations foot drop/toe drag  -     Comment, (Gait/Stairs) Gait slow with noted foot drop on L which is patients baseline.  -SM               User Key  (r) = Recorded By, (t) = Taken By, (c) = Cosigned By      Initials Name Provider Type    Lexus Guerrero PT Physical Therapist                   Obj/Interventions       Row Name 09/22/23 1526          Range of Motion Comprehensive    General Range of Motion bilateral lower extremity ROM WFL  -       Row Name 09/22/23 1526          Strength Comprehensive (MMT)    General Manual Muscle Testing (MMT) Assessment lower extremity strength deficits identified  -     Comment, General Manual Muscle Testing (MMT) Assessment Generalized LE weakness  -       Row Name 09/22/23 1526          Balance    Balance Assessment sitting static balance;sitting dynamic  balance;sit to stand dynamic balance;standing static balance;standing dynamic balance  -SM     Static Sitting Balance standby assist  -SM     Dynamic Sitting Balance standby assist  -SM     Position, Sitting Balance sitting edge of bed  -SM     Sit to Stand Dynamic Balance contact guard;verbal cues  -SM     Static Standing Balance contact guard  -SM     Dynamic Standing Balance contact guard  -SM     Position/Device Used, Standing Balance supported;walker, front-wheeled  -SM     Balance Interventions sitting;standing;sit to stand;supported;static;dynamic  -SM               User Key  (r) = Recorded By, (t) = Taken By, (c) = Cosigned By      Initials Name Provider Type     Lexus Cates PT Physical Therapist                   Goals/Plan       Row Name 09/22/23 1534          Bed Mobility Goal 1 (PT)    Activity/Assistive Device (Bed Mobility Goal 1, PT) bed mobility activities, all  -SM     [place] Level/Cues Needed (Bed Mobility Goal 1, PT) standby assist  -SM     Time Frame (Bed Mobility Goal 1, PT) 1 week  -       Row Name 09/22/23 1534          Transfer Goal 1 (PT)    Activity/Assistive Device (Transfer Goal 1, PT) sit-to-stand/stand-to-sit;bed-to-chair/chair-to-bed;walker, rolling  -SM     [place] Level/Cues Needed (Transfer Goal 1, PT) supervision required  -SM     Time Frame (Transfer Goal 1, PT) 1 week  -       Row Name 09/22/23 1534          Gait Training Goal 1 (PT)    Activity/Assistive Device (Gait Training Goal 1, PT) gait (walking locomotion);walker, rolling  -SM     [place] Level (Gait Training Goal 1, PT) standby assist  -SM     Distance (Gait Training Goal 1, PT) 150ft  -SM     Time Frame (Gait Training Goal 1, PT) 1 week  -               User Key  (r) = Recorded By, (t) = Taken By, (c) = Cosigned By      Initials Name Provider Type     Lexus Cates PT Physical Therapist                   Clinical Impression       Row Name 09/22/23 1526          Pain    Pretreatment  Pain Rating 0/10 - no pain  -SM     Posttreatment Pain Rating 0/10 - no pain  -SM       Row Name 09/22/23 1526          Plan of Care Review    Plan of Care Reviewed With patient  -     Outcome Evaluation Patient is an 87 y.o male who presented to [facility] with a cough and SOA. Patient AO to self and place this date. Patient wife and daughter present at bedside to assist with patient PLOF. Patient lives at home with his wife with no SHERITA. Patient uses a rwx at home for mobility and patients wife assists with ADLs. Patient on 2L O2 upon PT arrival. Patient does not wear O2 at home. Today patient completed all bed mobility with CGA. Patient performed STS from EOB and commode with CGA and VCs for hand placement. Patient ambulated 15ft 2x with rwx and CGA. Gait slow with noted foot drop on L which is patients baseline. Patient O2 sats dropped to 82% during ambulation while on 2L O2. Patient required assistance with BR needs this date. Patient returned to supine in bed at end of session. Patient would continue to benefit from skilled PT intervention to address deficits in functional mobility. Wife reports plan is to return home with assist and HHPT at d/c. PT will continue to monitor and progress as tolerated.  -       Row Name 09/22/23 1526          Therapy Assessment/Plan (PT)    Rehab Potential (PT) fair, will monitor progress closely  -     Criteria for Skilled Interventions Met (PT) yes  -     Therapy Frequency (PT) 6 times/wk  -       Row Name 09/22/23 1526          Vital Signs    Pre SpO2 (%) 95  -SM     O2 Delivery Pre Treatment supplemental O2  -SM     Intra SpO2 (%) 82  -SM     O2 Delivery Intra Treatment supplemental O2  -SM     Post SpO2 (%) 93  -SM     O2 Delivery Post Treatment supplemental O2  -SM     Pre Patient Position Supine  -SM     Intra Patient Position Standing  -SM     Post Patient Position Supine  -SM       Row Name 09/22/23 1526          Positioning and Restraints    Pre-Treatment Position in  bed  -SM     Post Treatment Position bed  -SM     In Bed encouraged to call for assist;exit alarm on;call light within reach;notified nsg;devendra  -               User Key  (r) = Recorded By, (t) = Taken By, (c) = Cosigned By      Initials Name Provider Type    Lexus Guerrero PT Physical Therapist                   Outcome Measures       Row Name 09/22/23 1535 09/22/23 0800       How much help from another person do you currently need...    Turning from your back to your side while in flat bed without using bedrails? 3  -SM 3  -BR    Moving from lying on back to sitting on the side of a flat bed without bedrails? 3  -SM 3  -BR    Moving to and from a bed to a chair (including a wheelchair)? 3  -SM 3  -BR    Standing up from a chair using your arms (e.g., wheelchair, bedside chair)? 3  -SM 3  -BR    Climbing 3-5 steps with a railing? 2  -SM 3  -BR    To walk in hospital room? 3  -SM 3  -BR    AM-PAC 6 Clicks Score (PT) 17  -SM 18  -BR    Highest level of mobility 5 --> Static standing  -SM 6 --> Walked 10 steps or more  -BR      Row Name 09/22/23 1535          Functional Assessment    Outcome Measure Options AM-PAC 6 Clicks Basic Mobility (PT)  -               User Key  (r) = Recorded By, (t) = Taken By, (c) = Cosigned By      Initials Name Provider Type    Lillian Ingram RN Registered Nurse    Lexus Guerrero PT Physical Therapist                                 Physical Therapy Education       Title: PT OT SLP Therapies (Done)       Topic: Physical Therapy (Done)       Point: Mobility training (Done)       Learning Progress Summary             Patient Acceptance, E, VU by KARLA at 9/22/2023 1535                         Point: Home exercise program (Done)       Learning Progress Summary             Patient Acceptance, E, VU by KARLA at 9/22/2023 1535                         Point: Body mechanics (Done)       Learning Progress Summary             Patient Acceptance, E, VU by KARLA at 9/22/2023 1535                          Point: Precautions (Done)       Learning Progress Summary             Patient Acceptance, E, VU by  at 9/22/2023 [id number]                                         User Key       Initials Effective Dates Name Provider Type Discipline     05/02/22 -  Lexus Cates, PT Physical Therapist PT                  PT Recommendation and Plan     Plan of Care Reviewed With: patient  Outcome Evaluation: Patient is an 87 y.o male who presented to [facility] with a cough and SOA. Patient AO to self and place this date. Patient wife and daughter present at bedside to assist with patient PLOF. Patient lives at home with his wife with no SHERITA. Patient uses a rwx at home for mobility and patients wife assists with ADLs. Patient on 2L O2 upon PT arrival. Patient does not wear O2 at home. Today patient completed all bed mobility with CGA. Patient performed STS from EOB and commode with CGA and VCs for hand placement. Patient ambulated 15ft 2x with rwx and CGA. Gait slow with noted foot drop on L which is patients baseline. Patient O2 sats dropped to 82% during ambulation while on 2L O2. Patient required assistance with BR needs this date. Patient returned to supine in bed at end of session. Patient would continue to benefit from skilled PT intervention to address deficits in functional mobility. Wife reports plan is to return home with assist and HHPT at d/c. PT will continue to monitor and progress as tolerated.     Time Calculation:         PT Charges       Row Name 09/22/23 1537             Time Calculation    Start Time 1412  -      Stop Time 1439  -      Time Calculation (min) 27 min  -      PT Received On 09/22/23  -      PT - Next Appointment 09/23/23  -      PT Goal Re-Cert Due Date 09/29/23  -         Time Calculation- PT    Total Timed Code Minutes- PT 18 minute(s)  -         Timed Charges    49207 - PT Therapeutic Activity Minutes 18  -SM         Total Minutes    Timed Charges Total Minutes 18  -SM        Total Minutes 18  -SM                User Key  (r) = Recorded By, (t) = Taken By, (c) = Cosigned By      Initials Name Provider Type     Lexus Cates, PT Physical Therapist                  Therapy Charges for Today       Code Description Service Date Service Provider Modifiers Qty    07251312200  PT THERAPEUTIC ACT EA 15 MIN 9/22/2023 Lexus Cates, PT GP 1    [id number] HC PT EVAL LOW COMPLEXITY 3 9/22/2023 Lexus Cates, PT GP 1            PT G-Codes  Outcome Measure Options: AM-PAC 6 Clicks Basic Mobility (PT)  AM-PAC 6 Clicks Score (PT): 17  PT Discharge Summary  Anticipated Discharge Disposition (PT): home with home health, home with assist    Lexus Cates PT  9/22/2023

## 2023-09-22 NOTE — PLAN OF CARE
Goal Outcome Evaluation:                 SLP arrived for dysphagia evaluation. Pt undergoing ECHO, d/w staff, request return in 20-30 min. Will f/u as pt available.

## 2023-09-22 NOTE — PLAN OF CARE
Goal Outcome Evaluation:  Plan of Care Reviewed With: patient        Progress: no change  Outcome Evaluation: Transfer from CICU today. Alert and orient to self. CT chest done prior to arrival to unit. Continues on IV zosyn. SLP ordered to remain NPO and will re-evaluate tomorrow. Okay for meds with applesause, but CT shows esophageal stricture, worsening. Echo done this morning. Dressing intact to left heel wound. Possible stage 1 pressure injuried noted to right heel, coccyx, bilateral heels. WOCN consult placed. Currently on 2L NC. Family at bedside this afternoon.

## 2023-09-22 NOTE — FSED PROVIDER NOTE
"    [facility]  eMERGENCY dEPARTMENT eNCOUnter      Pt Name: Brandon Yo  MRN: [id number]  Birthdate 1935  Date of evaluation: 9/22/2023  Provider: Fidencio Hui MD    CHIEF COMPLAINT       Chief Complaint   Patient presents with    Cough       Nurses Notes reviewed and I agree except as noted in the HPI.      HISTORY OF PRESENT ILLNESS       History provided by:  Patient and relative  History limited by:  Dementia      Brandon Yo is a 87 y.o. male who presents to the emergency department for a cough and shortness of breath.  Family tells me that the patient began to have a \"wet\" cough about 2 hours prior to arrival.  He has had issues with aspiration pneumonia in the past.  They tell me that this evening he suddenly developed this \"wet sound\" in his chest.  He began complaining that it was difficult for him to breathe.  On arrival, SPO2 was 86% on room air.  Patient was immediately placed on BiPAP due to his respiratory distress.  Patient has dementia.  He has no prior history of CHF.  He does have emphysema.      REVIEW OF SYSTEMS       Review of Systems   Unable to perform ROS: Dementia (Dementia)   Respiratory:  Positive for cough and shortness of breath.        PAST MEDICAL HISTORY     Past Medical History:   Diagnosis Date    Acid reflux     Aspiration into respiratory tract     Chronic right shoulder pain     H/O cervical discectomy     20 years prior    Hypertension    Dementia      SURGICAL HISTORY        has a past surgical history that includes Back surgery and Esophagogastroduodenoscopy (N/A, 7/13/2023).      CURRENT MEDICATIONS          Medication List        ASK your doctor about these medications      aspirin 81 MG EC tablet     donepezil 10 MG tablet  Commonly known as: ARICEPT     furosemide 20 MG tablet  Commonly known as: LASIX     gabapentin 100 MG capsule  Commonly known as: NEURONTIN     ipratropium-albuterol 0.5-2.5 mg/3 ml nebulizer  Commonly " "known as: DUO-NEB  Take 3 mL by nebulization 4 (Four) Times a Day As Needed for Wheezing.     latanoprost 0.005 % ophthalmic solution  Commonly known as: XALATAN     losartan 100 MG tablet  Commonly known as: COZAAR  Take 1 tablet by mouth Daily for 30 days.     memantine 10 MG tablet  Commonly known as: NAMENDA     metoprolol succinate XL 50 MG 24 hr tablet  Commonly known as: TOPROL-XL     pantoprazole 40 MG EC tablet  Commonly known as: PROTONIX     primidone 50 MG tablet  Commonly known as: MYSOLINE                ALLERGIES       has No Known Allergies.      FAMILY HISTORY       has no family status information on file.    family history is not on file.    SOCIAL HISTORY        reports that he has quit smoking. He has never used smokeless tobacco. He reports current alcohol use of about 7.0 standard drinks per week. He reports that he does not use drugs.    PHYSICAL EXAM       INITIAL VITALS:  height is 175.3 cm (69\") and weight is 67.6 kg (149 lb). His axillary temperature is 97.9 °F (36.6 °C). His blood pressure is 121/81 and his pulse is 91. His respiration is 19 and oxygen saturation is 99%.      Physical Exam  Vitals and nursing note reviewed.   Constitutional:       Appearance: Normal appearance. He is not ill-appearing.   HENT:      Head: Normocephalic and atraumatic.      Nose: Nose normal.      Mouth/Throat:      Mouth: Mucous membranes are moist.   Eyes:      Extraocular Movements: Extraocular movements intact.      Pupils: Pupils are equal, round, and reactive to light.   Cardiovascular:      Rate and Rhythm: Normal rate and regular rhythm.      Heart sounds: No murmur heard.  Pulmonary:      Effort: Tachypnea, accessory muscle usage and respiratory distress present.      Breath sounds: Examination of the right-upper field reveals decreased breath sounds and rales. Examination of the left-upper field reveals decreased breath sounds and rales. Examination of the right-middle field reveals decreased " breath sounds and rales. Examination of the left-middle field reveals rales. Examination of the right-lower field reveals decreased breath sounds and rales. Examination of the left-lower field reveals decreased breath sounds and rales. Decreased breath sounds and rales present. No wheezing.   Abdominal:      General: Abdomen is flat. There is no distension.      Palpations: Abdomen is soft.      Tenderness: There is no abdominal tenderness.   Skin:     General: Skin is warm and dry.      Capillary Refill: Capillary refill takes less than 2 seconds.   Neurological:      General: No focal deficit present.      Mental Status: He is alert.      GCS: GCS eye subscore is 4. GCS verbal subscore is 5. GCS motor subscore is 6.      Cranial Nerves: Cranial nerves 2-12 are intact.      Motor: Motor function is intact.   Psychiatric:         Mood and Affect: Mood and affect normal.         Behavior: Behavior normal.         Cognition and Memory: Cognition is impaired. Memory is impaired.         DIFFERENTIAL DIAGNOSIS:     Immunity acquired pneumonia, respiratory failure, aspiration pneumonia, CHF exacerbation, COPD exacerbation      DIAGNOSTIC RESULTS     EKG: All EKG's are interpreted by the Emergency Department Physician who either signs or Co-signs this chart in the absence of a cardiologist.    Normal sinus rhythm with a rate of 61, normal axis, right bundle branch block, prolonged IN at 211, prolonged QRS at 131, normal QTc at 437, no acute changes when compared to 7/9/2023.    RADIOLOGY: non-plain film images(s) such as CT, Ultrasound and MRI are read by the radiologist.  Plain radiographic images are visualized and preliminarily interpreted by the emergency physician unless otherwise stated below.    XR Chest 2 View   Final Result   Changes of chronic interstitial lung disease and pulmonary fibrosis.       This report was finalized on 9/21/2023 11:03 PM by Dr. Marisabel Soto M.D.                 LABS:   Lab Results  (last 24 hours)       Procedure Component Value Units Date/Time    COVID-19 and FLU A/B PCR - Swab, Nasopharynx [[id number]]  (Normal) Collected: 09/21/23 2210    Specimen: Swab from Nasopharynx Updated: 09/21/23 2234     COVID19 Not Detected     Influenza A PCR Not Detected     Influenza B PCR Not Detected    Narrative:      Fact sheet for providers: https://www.fda.gov/media/998132/download    Fact sheet for patients: https://www.fda.gov/media/914203/download    Test performed by PCR.    CBC & Differential [[id number]]  (Abnormal) Collected: 09/21/23 2216    Specimen: Blood Updated: 09/21/23 2233    Narrative:      The following orders were created for panel order CBC & Differential.  Procedure                               Abnormality         Status                     ---------                               -----------         ------                     CBC Auto Differential[[id number]]        Abnormal            Final result                 Please view results for these tests on the individual orders.    Comprehensive Metabolic Panel [[id number]]  (Abnormal) Collected: 09/21/23 2216    Specimen: Blood Updated: 09/21/23 2253     Glucose 113 mg/dL      BUN 29 mg/dL      Creatinine 1.15 mg/dL      Sodium 139 mmol/L      Potassium 4.6 mmol/L      Chloride 104 mmol/L      CO2 24.1 mmol/L      Calcium 9.3 mg/dL      Total Protein 7.6 g/dL      Albumin 4.1 g/dL      ALT (SGPT) 13 U/L      AST (SGOT) 14 U/L      Alkaline Phosphatase 71 U/L      Total Bilirubin 0.3 mg/dL      Globulin 3.5 gm/dL      A/G Ratio 1.2 g/dL      BUN/Creatinine Ratio 25.2     Anion Gap 10.9 mmol/L      eGFR 62.0 mL/min/1.73     Narrative:      GFR Normal >60  Chronic Kidney Disease <60  Kidney Failure <15    The GFR formula is only valid for adults with stable renal function between ages 18 and 70.    BNP [[id number]]  (Normal) Collected: 09/21/23 2216    Specimen: Blood Updated: 09/21/23 2300     proBNP 496.7 pg/mL     Narrative:      Among  patients with dyspnea, NT-proBNP is highly sensitive for the detection of acute congestive heart failure. In addition NT-proBNP of <300 pg/ml effectively rules out acute congestive heart failure with 99% negative predictive value.      Single High Sensitivity Troponin T [[id number]]  (Abnormal) Collected: 09/21/23 2216    Specimen: Blood Updated: 09/21/23 2252     HS Troponin T 28 ng/L     Narrative:      High Sensitive Troponin T Reference Range:  <10.0 ng/L- Negative Female for AMI  <15.0 ng/L- Negative Male for AMI  >=10 - Abnormal Female indicating possible myocardial injury.  >=15 - Abnormal Male indicating possible myocardial injury.   Clinicians would have to utilize clinical acumen, EKG, Troponin, and serial changes to determine if it is an Acute Myocardial Infarction or myocardial injury due to an underlying chronic condition.         CBC Auto Differential [[id number]]  (Abnormal) Collected: 09/21/23 2216    Specimen: Blood Updated: 09/21/23 2233     WBC 9.37 10*3/mm3      RBC 4.63 10*6/mm3      Hemoglobin 13.7 g/dL      Hematocrit 44.4 %      MCV 95.9 fL      MCH 29.6 pg      MCHC 30.9 g/dL      RDW 15.2 %      RDW-SD 54.0 fl      MPV 10.4 fL      Platelets 301 10*3/mm3      Neutrophil % 67.3 %      Lymphocyte % 25.0 %      Monocyte % 4.6 %      Eosinophil % 2.7 %      Basophil % 0.3 %      Immature Grans % 0.1 %      Neutrophils, Absolute 6.31 10*3/mm3      Lymphocytes, Absolute 2.34 10*3/mm3      Monocytes, Absolute 0.43 10*3/mm3      Eosinophils, Absolute 0.25 10*3/mm3      Basophils, Absolute 0.03 10*3/mm3      Immature Grans, Absolute 0.01 10*3/mm3     Lactic Acid, Plasma [[id number]]  (Normal) Collected: 09/21/23 2307    Specimen: Blood Updated: 09/21/23 2335     Lactate 1.5 mmol/L     Blood Culture - Blood, Arm, Left [[id number]] Collected: 09/21/23 2308    Specimen: Blood from Arm, Left Updated: 09/21/23 2316    Blood Culture - Blood, Arm, Right [[id number]] Collected: 09/21/23 2311    Specimen: Blood  "from Arm, Right Updated: 09/21/23 2311            EMERGENCY DEPARTMENT COURSE:   Vitals:    Vitals:    09/22/23 0030 09/22/23 0200 09/22/23 0301 09/22/23 0358   BP: 169/80 (!) 151/114 128/88 121/81   BP Location:  Right arm Right arm Right arm   Patient Position:  Sitting Sitting Lying   Pulse: 74 72 81 91   Resp: 26 24 21 19   Temp:   97.9 °F (36.6 °C)    TempSrc:   Axillary    SpO2: 100% 100% 100% 99%   Weight:  67.6 kg (149 lb)     Height:  175.3 cm (69\")         MDM  Number of Diagnoses or Management Options  Respiratory distress: new and requires workup  Diagnosis management comments: Pt presented with respiratory distress.  It was difficult to determine what was the underlying cause of his distress was coming from.  I began treating patient for COPD, aspiration PNA and CHF exacerbation.  Patient was immediately placed on a BiPAP.  He was 85% SPO2 prior to this.  I was able to get him up to 100% SPO2.  Chest imaging showed fibrosis and COPD changes.  There was no overt pulmonary edema noted.  He did sound quite wet on auscultation.  There is no apparent pneumonia present either.  Patient was given 80 of Lasix.  As the medication began to work, I have not been able to capture an appropriate urine output due to his dementia.  Patient was also started on antibiotics for questionable aspiration pneumonia.  I suspect the Lasix has done more good than anything at this point.  His breathing has become less labored while on the BiPAP.  I suspect that this was a fluid overload and CHF exacerbation that caused his respiratory distress tonight.  Overall though, lab work is essentially unremarkable.  We are going to place the patient in ICU.  I spoke to Dr. Jaimes and he agreed to admit the patient for further evaluation and treatment.       Amount and/or Complexity of Data Reviewed  Clinical lab tests: ordered and reviewed  Tests in the radiology section of CPT®: ordered and reviewed  Obtain history from someone other than " the patient: yes (Spouse  )  Review and summarize past medical records: yes  Discuss the patient with other providers: yes  Independent visualization of images, tracings, or specimens: yes    Risk of Complications, Morbidity, and/or Mortality  Presenting problems: high  Diagnostic procedures: high  Management options: high    Critical Care  Total time providing critical care: 123 minutes    Patient Progress  Patient progress: stable      The patient was given the following medications:  Medications   sodium chloride 0.9 % flush 10 mL (has no administration in time range)   furosemide (LASIX) injection 80 mg (80 mg Intravenous Given 9/21/23 2311)   cefTRIAXone (ROCEPHIN) 1,000 mg in sodium chloride 0.9 % 100 mL IVPB-VTB (0 mg Intravenous Stopped 9/21/23 2349)   azithromycin (ZITHROMAX) 500 mg in sodium chloride 0.9 % 250 mL IVPB-VTB (0 mg Intravenous Stopped 9/22/23 0107)       ED Course as of 09/22/23 0448   Thu Sep 21, 2023   2354 [facility] called for transfer   [PM]   Fri Sep 22, 2023   0057 I spoke with Dr. Mendez at 00 44 about admitting the patient.  He suggested speaking to the intensivist since the patient was looking pretty rough. [PM]   0104 I spoke with Dr. Nance and he is agreed to accept the patient for ICU admission [PM]      ED Course User Index  [PM] Fidencio Hui MD       Critical Care  Performed by: Fidencio Hui MD  Authorized by: Fidencio Hui MD     Critical care provider statement:     Critical care time (minutes):  123    Critical care start time:  9/21/2023 10:07 PM    Critical care end time:  9/22/2023 12:03 AM    Critical care time was exclusive of:  Separately billable procedures and treating other patients    Critical care was necessary to treat or prevent imminent or life-threatening deterioration of the following conditions:  Cardiac failure and respiratory failure    Critical care was time spent personally by me on the following activities:  Development of treatment  plan with patient or surrogate, discussions with consultants, evaluation of patient's response to treatment, examination of patient, obtaining history from patient or surrogate, review of old charts, re-evaluation of patient's condition, pulse oximetry, ordering and review of radiographic studies, ordering and review of laboratory studies and ordering and performing treatments and interventions    Care discussed with: admitting provider      CRITICAL CARE:  none    CONSULTS:  none    PROCEDURES:  None    FINAL IMPRESSION      1. Respiratory distress          DISPOSITION/PLAN         PATIENT REFERRED TO:  No follow-up provider specified.    DISCHARGE MEDICATIONS:     Medication List        ASK your doctor about these medications      aspirin 81 MG EC tablet     donepezil 10 MG tablet  Commonly known as: ARICEPT     furosemide 20 MG tablet  Commonly known as: LASIX     gabapentin 100 MG capsule  Commonly known as: NEURONTIN     ipratropium-albuterol 0.5-2.5 mg/3 ml nebulizer  Commonly known as: DUO-NEB  Take 3 mL by nebulization 4 (Four) Times a Day As Needed for Wheezing.     latanoprost 0.005 % ophthalmic solution  Commonly known as: XALATAN     losartan 100 MG tablet  Commonly known as: COZAAR  Take 1 tablet by mouth Daily for 30 days.     memantine 10 MG tablet  Commonly known as: NAMENDA     metoprolol succinate XL 50 MG 24 hr tablet  Commonly known as: TOPROL-XL     pantoprazole 40 MG EC tablet  Commonly known as: PROTONIX     primidone 50 MG tablet  Commonly known as: MYSOLINE              (Please note that portions of this note were completed with a voice recognition program.  Efforts were made to edit the dictations but occasionally words are mis-transcribed.)    Fidencio Hui MD (electronically signed) Emergency Physician

## 2023-09-22 NOTE — PLAN OF CARE
Goal Outcome Evaluation:  Plan of Care Reviewed With: patient           Outcome Evaluation: Patient is an 87 y.o male who presented to [facility] with a cough and SOA. Patient AO to self and place this date. Patient wife and daughter present at bedside to assist with patient PLOF. Patient lives at home with his wife with no SHERITA. Patient uses a rwx at home for mobility and patients wife assists with ADLs. Patient on 2L O2 upon PT arrival. Patient does not wear O2 at home. Today patient completed all bed mobility with CGA. Patient performed STS from EOB and commode with CGA and VCs for hand placement. Patient ambulated 15ft 2x with rwx and CGA. Gait slow with noted foot drop on L which is patients baseline. Patient O2 sats dropped to 82% during ambulation while on 2L O2. Patient required assistance with BR needs this date. Patient returned to supine in bed at end of session. Patient would continue to benefit from skilled PT intervention to address deficits in functional mobility. Wife reports plan is to return home with assist and HHPT at d/c. PT will continue to monitor and progress as tolerated.      Anticipated Discharge Disposition (PT): home with home health, home with assist

## 2023-09-22 NOTE — PROGRESS NOTES
"  Intensive Care Unit Daily Progress Note.   [facility] CARDIAC INTENSIVE CARE  9/22/2023    Patient Name:  Brandon Yo  MRN:  [id number]  YOB: 1935  Age: 87 y.o.  Sex: male         Reason for Admission / Chief Complaint:  Respiratory distress    Hospital Course:   87-year-old male with history of chronic obstructive pulmonary disease, dementia, recurrent aspiration with interstitial changes who presented with respiratory distress initially placed on noninvasive ventilation with improvement in his respiratory status.  Transferred to the ICU for further management.    Interval History:  Admitted overnight for respiratory distress  Admitted to the ICU due to noninvasive ventilation  Taken off NIV and placed on 2 L nasal cannula  On evaluation patient states that his breathing is improved  Is unable to provide an adequate history due to underlying dementia  States that his appetite has been poor because he does not want to eat    Physical Exam:  /71 (BP Location: Right arm, Patient Position: Lying)   Pulse 80   Temp 98.3 °F (36.8 °C) (Oral)   Resp 27   Ht 173.3 cm (68.23\")   Wt 65.3 kg (143 lb 15.4 oz)   SpO2 98%   BMI 21.74 kg/m²   Body mass index is 21.74 kg/m².    Intake/Output  No intake or output data in the 24 hours ending 09/22/23 0834  General: Alert, nontoxic, NAD  HEENT: NC/AT, EOMI, MMM  Neck: Supple, trachea midline  Cardiac: RRR, no murmur, gallops, rubs  Pulmonary: Diminished breath sounds bilaterally, normal respiratory effort  GI: Soft, non-tender, non-distended, normal bowel sounds  Extremities: Warm, well perfused, no LE edema  Skin: no visible rash  Neuro: CN II - XII grossly intact  Psychiatry: Normal mood and affect    Data Review:  Notable Labs:  Results from last 7 days   Lab Units 09/21/23  2216   WBC 10*3/mm3 9.37   HEMOGLOBIN g/dL 13.7   PLATELETS 10*3/mm3 301     Results from last 7 days   Lab Units 09/21/23  2216   SODIUM mmol/L 139   POTASSIUM " mmol/L 4.6   CHLORIDE mmol/L 104   CO2 mmol/L 24.1   BUN mg/dL 29*   CREATININE mg/dL 1.15   GLUCOSE mg/dL 113*   CALCIUM mg/dL 9.3   Estimated Creatinine Clearance: 42.6 mL/min (by C-G formula based on SCr of 1.15 mg/dL).    Results from last 7 days   Lab Units 09/21/23  2307 09/21/23  2216   AST (SGOT) U/L  --  14   ALT (SGPT) U/L  --  13   LACTATE mmol/L 1.5  --    PLATELETS 10*3/mm3  --  301       Results from last 7 days   Lab Units 09/22/23  0527   PH, ARTERIAL pH units 7.435   PCO2, ARTERIAL mm Hg 35.1   PO2 ART mm Hg 76.5*   HCO3 ART mmol/L 23.6     Imaging:  Reviewed chest images personally from past 3 days    ASSESSMENT  /  PLAN:    Acute hypoxic respiratory failure  Aspiration pneumonia  Heart failure with preserved ejection fraction  Interstitial lung disease-possibly from chronic aspiration  Dysphagia  Chronic obstructive pulmonary disease/emphysema  Chronically elevated right hemidiaphragm  Hypertension  Dementia    -Patient's respiratory status improved and is now off of NIV and on 4 L nasal cannula.  This may be in the setting of aspiration with pneumonitis/pneumonia versus possible pulmonary edema as the patient was hypertensive on admission and quickly improved after noninvasive ventilation.  -Continue with DuoNebs every 4 hours  -Continue Zosyn at this time, procalcitonin pending  -Hold off on diuresis at this time due to borderline blood pressures, does not appear overloaded, BNP not markedly elevated  -High-resolution CT pending  -Swallow evaluation ordered  -Repeat echocardiogram pending    GI prophylaxis: Pantoprazole  DVT prophylaxis: SCDs  Alves catheter: No  Bowel regimen: Ordered  Diet: N.p.o.    Discharge: Continue to monitor in the ICU due to borderline blood pressures.  If stable in the afternoon will consider transfer to the floor.    Markus Lynn MD  [facility]  Pulmonary and Critical Care Medicine, Interventional Pulmonology    Parts of this note may be an  electronic transcription/translation of spoken language to printed text using the Dragon dictation system.

## 2023-09-22 NOTE — ED NOTES
Pt incont of large amount of urine. Pt changed into clean brief and gown. Transport team arrived. Primary RN gave report to transport team.

## 2023-09-22 NOTE — ED NOTES
Pt moved to room closer to nurses station following family leaving for the night. Pt FIO2 weaned to 65%. Oxygen sats % on 65% FIO2.

## 2023-09-22 NOTE — ED NOTES
Provider at bedside along with Charge RN, setting up Bi-Pap at this time. Verbal order for 5 of PEEP

## 2023-09-22 NOTE — PLAN OF CARE
"Goal Outcome Evaluation:                   SLP orders received and appreciated per pulmonology secondary to concern for aspiration component of respiratory distress, known hx of esophageal dysphagia from prior July 2023 admit. Pt seen for bedside swallow evaluation;esophagram-results with dysmotility, stenosis at GE junction, and hiatal hernia; EGD with dilation of stricture at GE junction. Recommendations 7.14.23 including regular/thin avoid difficult food, reflux precautions. Pt endorses poor intake, unreliable historian with most questions regarding ongoing dysphagia s/p discharge. Full feed assist required for all trials. Pt with adequate labial seal on tsp and straw, cup deferred. Timely transit puree and liquids. Multiple swallows visualized across consistencies, appear increased for heavier bolus (up to 3-4 for puree). Throat clear present following x1 water by tsp, x2 water by straw, x2 NTL by tsp, x1 HTL by tsp, x1/3 puree. Cough x1 water by straw. Inconsistent belching. Pt c/o globus following x3 trials puree and odynophagia x1 with thin water by straw, stating \"It's too cold it hurts, \" indication near substernal region.     Recommend: NPO with non-oral means of nutrition/hydration. Pt may have minimal trials meds whole or crushed in puree.     SLP to re-evaluate at bedside with further recommendations pending.      "

## 2023-09-22 NOTE — ED NOTES
"Nursing report ED to floor  Brandon MOTNEZ Gilchrist  87 y.o.  male    HPI :   Chief Complaint   Patient presents with    Cough       Admitting doctor:   Tristin Nance MD    Admitting diagnosis:   The encounter diagnosis was Respiratory distress.    Code status:   Current Code Status       Date Active Code Status Order ID Comments User Context       Prior            Allergies:   Patient has no known allergies.    Isolation:   No active isolations    Intake and Output  No intake or output data in the 24 hours ending 09/22/23 0350    Weight:       09/22/23  0200   Weight: 67.6 kg (149 lb)       Most recent vitals:   Vitals:    09/22/23 0000 09/22/23 0030 09/22/23 0200 09/22/23 0301   BP: 155/84 169/80 (!) 151/114 128/88   BP Location:   Right arm Right arm   Patient Position:   Sitting Sitting   Pulse: 63 74 72 81   Resp: 26 26 24 21   Temp:    97.9 °F (36.6 °C)   TempSrc:    Axillary   SpO2: 100% 100% 100% 100%   Weight:   67.6 kg (149 lb)    Height:   175.3 cm (69\")        Active LDAs/IV Access:   Lines, Drains & Airways       Active LDAs       Name Placement date Placement time Site Days    Peripheral IV 09/21/23 2216 Right Antecubital 09/21/23 2216  Antecubital  less than 1    Peripheral IV 09/21/23 2350 Anterior;Left Forearm 09/21/23 2350  Forearm  less than 1                    Labs (abnormal labs have a star):   Labs Reviewed   COMPREHENSIVE METABOLIC PANEL - Abnormal; Notable for the following components:       Result Value    Glucose 113 (*)     BUN 29 (*)     BUN/Creatinine Ratio 25.2 (*)     All other components within normal limits    Narrative:     GFR Normal >60  Chronic Kidney Disease <60  Kidney Failure <15    The GFR formula is only valid for adults with stable renal function between ages 18 and 70.   SINGLE HSTROPONIN T - Abnormal; Notable for the following components:    HS Troponin T 28 (*)     All other components within normal limits    Narrative:     High Sensitive Troponin T Reference Range:  <10.0 " ng/L- Negative Female for AMI  <15.0 ng/L- Negative Male for AMI  >=10 - Abnormal Female indicating possible myocardial injury.  >=15 - Abnormal Male indicating possible myocardial injury.   Clinicians would have to utilize clinical acumen, EKG, Troponin, and serial changes to determine if it is an Acute Myocardial Infarction or myocardial injury due to an underlying chronic condition.        CBC WITH AUTO DIFFERENTIAL - Abnormal; Notable for the following components:    MCHC 30.9 (*)     Monocyte % 4.6 (*)     All other components within normal limits   COVID-19 AND FLU A/B, NP SWAB IN TRANSPORT MEDIA 8-12 HR TAT - Normal    Narrative:     Fact sheet for providers: https://www.fda.gov/media/453584/download    Fact sheet for patients: https://www.fda.gov/media/417087/download    Test performed by PCR.   BNP (IN-HOUSE) - Normal    Narrative:     Among patients with dyspnea, NT-proBNP is highly sensitive for the detection of acute congestive heart failure. In addition NT-proBNP of <300 pg/ml effectively rules out acute congestive heart failure with 99% negative predictive value.     LACTIC ACID, PLASMA - Normal   BLOOD CULTURE   BLOOD CULTURE   CBC AND DIFFERENTIAL    Narrative:     The following orders were created for panel order CBC & Differential.  Procedure                               Abnormality         Status                     ---------                               -----------         ------                     CBC Auto Differential[[id number]]        Abnormal            Final result                 Please view results for these tests on the individual orders.       EKG:   ECG 12 Lead ED Triage Standing Order; SOA   Preliminary Result   HEART RATE= 61  bpm   RR Interval= 984  ms   TN Interval= 211  ms   P Horizontal Axis= 9  deg   P Front Axis= 19  deg   QRSD Interval= 131  ms   QT Interval= 433  ms   QTcB= 437  ms   QRS Axis= -2  deg   T Wave Axis= 4  deg   - ABNORMAL ECG -   Sinus rhythm   Consider left  atrial enlargement   Right bundle branch block   Electronically Signed By:    Date and Time of Study: 2023-09-21 22:21:03          Meds given in ED:   Medications   sodium chloride 0.9 % flush 10 mL (has no administration in time range)   furosemide (LASIX) injection 80 mg (80 mg Intravenous Given 9/21/23 2311)   cefTRIAXone (ROCEPHIN) 1,000 mg in sodium chloride 0.9 % 100 mL IVPB-VTB (0 mg Intravenous Stopped 9/21/23 2349)   azithromycin (ZITHROMAX) 500 mg in sodium chloride 0.9 % 250 mL IVPB-VTB (0 mg Intravenous Stopped 9/22/23 0107)       Imaging results:  XR Chest 2 View    Result Date: 9/21/2023  Changes of chronic interstitial lung disease and pulmonary fibrosis.  This report was finalized on 9/21/2023 11:03 PM by Dr. Marisabel Soto M.D.       Ambulatory status:   - non ambulatory    Social issues:   Social History     Socioeconomic History    Marital status:    Tobacco Use    Smoking status: Former    Smokeless tobacco: Never   Vaping Use    Vaping Use: Never used   Substance and Sexual Activity    Alcohol use: Yes     Alcohol/week: 7.0 standard drinks     Types: 7 Shots of liquor per week     Comment: occ    Drug use: No       NIH Stroke Scale:       Manpreet Watson RN  09/22/23 03:50 EDT

## 2023-09-22 NOTE — ED NOTES
A returned page. Per CARLI Carrasco, Alirio Dickey needs to be paged regarding this pt. Page sent to Alirio Dickey.

## 2023-09-22 NOTE — H&P
H&P NOTE    Patient Identification:  Brandon MONTEZ [place]  87 y.o.  male  1935  [id number]                CC: Respiratory distress    History of Present Illness:  87-year-old gentleman with history of COPD dementia and recurrent aspiration.  Previous CT chest Showed emphysematous changes.  Patient has also had a sniff test done due to chronic right hemidiaphragm showing normal movement of bilateral diaphragm.  Last admitted July with similar episode of suspected aspiration pneumonitis due to dysphagia with underlying COPD.  I was called by [facility] ER physician and was told that patient was in acute respiratory distress on arrival to the ER and was placed on noninvasive ventilation 100% FiO2.  Patient was given bronchodilators and Lasix in the ER and subsequently was transferred to [facility], operated by [facility] ICU.  He is currently on nasal cannula oxygen.  Due to his underlying dementia he is not the best of historian.  Currently requiring 2 L oxygen nasal cannula.  He reports shortness of breath at rest.  Denies some cough.  No clear history of fever or chills.  Denies any chest pain.      Review of Systems  As above rest is negative  Past Medical History:  Past Medical History:   Diagnosis Date    Acid reflux     Aspiration into respiratory tract     Chronic right shoulder pain     H/O cervical discectomy     20 years prior    Hypertension        Past Surgical History:  Past Surgical History:   Procedure Laterality Date    BACK SURGERY      ENDOSCOPY N/A 7/13/2023    Procedure: ESOPHAGOGASTRODUODENOSCOPY WITH BIOPSY AND BALLOON DILATATION 15-16.5MM;  Surgeon: Jimmy Amor MD;  Location: [facility] ENDOSCOPY;  Service: Gastroenterology;  Laterality: N/A;  DYSPHAGIA  POST: GASTRIC POLYPS; ESOPHAGEAL STRICTURE; HIATAL HERNIA; GE JUNCTION INFLAMMATION        Home Meds:  Medications Prior to Admission   Medication Sig Dispense Refill Last Dose    aspirin 81 MG EC tablet Take 1 tablet by mouth Daily.       donepezil (ARICEPT) 10 MG  "tablet Take 1 tablet by mouth Daily.       furosemide (LASIX) 20 MG tablet Take 1 tablet by mouth Daily.       gabapentin (NEURONTIN) 100 MG capsule Take 1 capsule by mouth 3 (Three) Times a Day.       ipratropium-albuterol (DUO-NEB) 0.5-2.5 mg/3 ml nebulizer Take 3 mL by nebulization 4 (Four) Times a Day As Needed for Wheezing. 120 mL 0     latanoprost (XALATAN) 0.005 % ophthalmic solution Administer 1 drop to both eyes Every Night.       losartan (COZAAR) 100 MG tablet Take 1 tablet by mouth Daily for 30 days. 30 tablet 0     memantine (NAMENDA) 10 MG tablet Take 1 tablet by mouth 2 (Two) Times a Day.       metoprolol succinate XL (TOPROL-XL) 50 MG 24 hr tablet Take 1 tablet by mouth Daily.       pantoprazole (PROTONIX) 40 MG EC tablet Take 1 tablet by mouth Daily.       primidone (MYSOLINE) 50 MG tablet Take 1 tablet by mouth Every Night.          Allergies:  No Known Allergies    Social History:   Social History     Socioeconomic History    Marital status:    Tobacco Use    Smoking status: Former    Smokeless tobacco: Never   Vaping Use    Vaping Use: Never used   Substance and Sexual Activity    Alcohol use: Yes     Alcohol/week: 7.0 standard drinks     Types: 7 Shots of liquor per week     Comment: occ    Drug use: No       Family History:  History reviewed. No pertinent family history.    Physical Exam:  /67 (BP Location: Right arm, Patient Position: Lying)   Pulse 91   Temp 98.3 °F (36.8 °C) (Oral)   Resp 25   Ht 173.3 cm (68.23\")   Wt 65.3 kg (143 lb 15.4 oz)   SpO2 100%   BMI 21.74 kg/m²  Body mass index is 21.74 kg/m². 100% 65.3 kg (143 lb 15.4 oz)  Physical Exam  Patient is examined using the personal protective equipment as per guidelines from infection control for this particular patient as enacted.  Hand hygiene was performed before and after patient interaction.  Well-developed normal body habitus  Eyes normal conjunctivae and pupils reactive to light  ENT Mallampati between 3 and " 4 normal nasal exam  Neck midline trachea no thyromegaly  Chest diminished breath sounds with crackles bilaterally on the bases  CVS regular rate and rhythm no lower extremity edema  Abdomen soft nontender no hepatosplenomegaly  CNS intact normal sensory exam  Skin no rashes no nodules  Psych oriented to time place and person normal memory  Musculoskeletal no cyanosis no clubbing normal range of motion        LABS:  Lab Results   Component Value Date    CALCIUM 9.3 09/21/2023     Results from last 7 days   Lab Units 09/21/23  2216   SODIUM mmol/L 139   POTASSIUM mmol/L 4.6   CHLORIDE mmol/L 104   CO2 mmol/L 24.1   BUN mg/dL 29*   CREATININE mg/dL 1.15   GLUCOSE mg/dL 113*   CALCIUM mg/dL 9.3   WBC 10*3/mm3 9.37   HEMOGLOBIN g/dL 13.7   PLATELETS 10*3/mm3 301   ALT (SGPT) U/L 13   AST (SGOT) U/L 14   PROBNP pg/mL 496.7     Lab Results   Component Value Date    TROPONINT 28 (H) 09/21/2023     Results from last 7 days   Lab Units 09/21/23  2216   HSTROP T ng/L 28*         Results from last 7 days   Lab Units 09/21/23  2307   LACTATE mmol/L 1.5     Results from last 7 days   Lab Units 09/22/23  0527   PH, ARTERIAL pH units 7.435   PCO2, ARTERIAL mm Hg 35.1   PO2 ART mm Hg 76.5*   O2 SATURATION ART % 95.7   FLOW RATE lpm 4.0000   MODALITY  Cannula     Results from last 7 days   Lab Units 09/21/23  2210   INFLUENZA B PCR  Not Detected             Lab Results   Component Value Date    TSH 1.840 07/11/2023     Estimated Creatinine Clearance: 42.6 mL/min (by C-G formula based on SCr of 1.15 mg/dL).         Imaging: I personally visualized the images of scans/x-rays performed within last 3 days.      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  He presented with acute respiratory distress now clinically improved with Lasix.  Clinically it is difficult to say whether he has an aspiration component this episode.  He did improve with Lasix.  Underlying history of diastolic dysfunction on last echo.  Post Lasix his blood pressure is now borderline low.  Will hold further diuretics.  We will repeat 2D echo.  I will check procalcitonin.  Initiate Zosyn to cover for aspiration pneumonia.  If procalcitonin negative may discontinue antibiotics.  Check high-resolution CT chest to evaluate further  Bronchodilators will be continued.  No need for steroids not actively wheezing at this time  Swallow eval will be ordered  Monitor clinical course closely.              Tristin Nance MD  9/22/2023  06:07 EDT      Much of this encounter note is an electronic transcription/translation of spoken language to printed text using Dragon Software.

## 2023-09-22 NOTE — THERAPY EVALUATION
Acute Care - Speech Language Pathology   Swallow Initial Evaluation [facility]     Patient Name: Brandon Yo  : 1935  MRN: [id number]  Today's Date: 2023               Admit Date: 2023    Visit Dx:     ICD-10-CM ICD-9-CM   1. Respiratory distress  R06.03 786.09     Patient Active Problem List   Diagnosis    Acute respiratory failure with hypoxia    Esophageal dysphagia    Respiratory distress     Past Medical History:   Diagnosis Date    Acid reflux     Aspiration into respiratory tract     Chronic right shoulder pain     H/O cervical discectomy     20 years prior    Hypertension      Past Surgical History:   Procedure Laterality Date    BACK SURGERY      ENDOSCOPY N/A 2023    Procedure: ESOPHAGOGASTRODUODENOSCOPY WITH BIOPSY AND BALLOON DILATATION 15-16.5MM;  Surgeon: Jimmy Amor MD;  Location: [facility] ENDOSCOPY;  Service: Gastroenterology;  Laterality: N/A;  DYSPHAGIA  POST: GASTRIC POLYPS; ESOPHAGEAL STRICTURE; HIATAL HERNIA; GE JUNCTION INFLAMMATION       SLP Recommendation and Plan    Recommend: NPO with non-oral means of nutrition/hydration. Pt may have minimal trials meds whole or crushed in puree.     SLP to re-evaluate at bedside with further recommendations pending.          SWALLOW EVALUATION (last 72 hours)       SLP Adult Swallow Evaluation       Row Name 23 1400       Rehab Evaluation    Document Type evaluation  -AB    Subjective Information no complaints  -AB    Patient Observations alert;cooperative  -AB    Patient/Family/Caregiver Comments/Observations seen in cicu 451  -AB    Patient Effort good  -AB    Symptoms Noted During/After Treatment none  -AB    Symptoms Noted, Comment --  -AB       General Information    Patient Profile Reviewed yes  -AB    Pertinent History Of Current Problem 87-year-old gentleman with history of COPD dementia and recurrent aspiration.  Previous CT chest Showed emphysematous changes.  Patient has also had a sniff test done due  to chronic right hemidiaphragm showing normal movement of bilateral diaphragm.  Last admitted July with similar episode of suspected aspiration pneumonitis due to dysphagia with underlying COPD.  I was called by [facility] physician and was told that patient was in acute respiratory distress on arrival to the ER and was placed on noninvasive ventilation 100% FiO2.  Patient was given bronchodilators and Lasix in the ER and subsequently was transferred to [facility] ICU.  He is currently on nasal cannula oxygen.  Due to his underlying dementia he is not the best of historian.  Currently requiring 2 L oxygen nasal cannula.  He reports shortness of breath at rest.  Denies some cough.  No clear history of fever or chills.  Denies any chest pain.  -AB    Current Method of Nutrition NPO  -AB    Precautions/Limitations, Vision WFL  -AB    Precautions/Limitations, Hearing WFL  -AB    Prior Level of Function-Communication cognitive-linguistic impairment  dementia  -AB    Prior Level of Function-Swallowing esophageal concerns;regular textures;thin liquids  -AB    Plans/Goals Discussed with patient  -AB    Barriers to Rehab medically complex;previous functional deficit  -AB    Patient's Goals for Discharge patient did not state  -AB       Oral Motor Structure and Function    Oral Lesions or Structural Abnormalities and/or variants none  -AB    Dentition Assessment natural, present and adequate  -AB    Secretion Management WNL/WFL  -AB    Mucosal Quality moist, healthy  -AB    Gag Response --  dnt  -AB    Volitional Swallow WFL  -AB    Volitional Cough weak  -AB       Oral Musculature and Cranial Nerve Assessment    Oral Motor General Assessment lingual impairment  -AB    Lingual Impairment, Detail. Cranial Nerves IX, XII (Glossopharyngeal and Hypoglossal) reduced lingual ROM  tremulous at rest  -AB       General Eating/Swallowing Observations    Respiratory Support Currently in Use nasal cannula  -AB    Eating/Swallowing  "Skills fed by SLP  -AB    Positioning During Eating upright 90 degree  -AB    Utensils Used spoon;straw  -AB    Consistencies Trialed ice chips;thin liquids;pureed;nectar/syrup-thick liquids;honey-thick liquids  -AB       Clinical Swallow Eval    Clinical Swallow Evaluation Summary SLP orders received and appreciated per pulmonology secondary to concern for aspiration component of respiratory distress, known hx of esophageal dysphagia from prior July 2023 admit. Pt seen for bedside swallow evaluation;esophagram-results with dysmotility, stenosis at GE junction, and hiatal hernia; EGD with dilation of stricture at GE junction. Recommendations 7.14.23 including regular/thin avoid difficult food, reflux precautions. Pt endorses poor intake, unreliable historian with most questions regarding ongoing dysphagia s/p discharge. Full feed assist required for all trials. Pt with adequate labial seal on tsp and straw, cup deferred. Timely transit puree and liquids. Multiple swallows visualized across consistencies, appear increased for heavier bolus (up to 3-4 for puree). Throat clear present following x1 water by tsp, x2 water by straw, x2 NTL by tsp, x1 HTL by tsp, x1/3 puree. Cough x1 water by straw. Inconsistent belching. Pt c/o globus following x3 trials puree and odynophagia x1 with thin water by straw, stating \"It's too cold it hurts, \" indication near substernal region.     Recommend: NPO with non-oral means of nutrition/hydration. Pt may have minimal trials meds whole or crushed in puree.     SLP to re-evaluate at bedside with further recommendations pending.  -AB       SLP Evaluation Clinical Impression    SLP Swallowing Diagnosis R/O pharyngeal dysphagia;suspected esophageal dysphagia  -AB    Functional Impact risk of aspiration/pneumonia;risk of malnutrition;risk of dehydration  -AB    Rehab Potential/Prognosis, Swallowing re-evaluate goals as necessary  -AB    Swallow Criteria for Skilled Therapeutic Interventions " Met demonstrates skilled criteria  -AB       SLP Treatment Clinical Impressions    Care Plan Review evaluation/treatment results reviewed;care plan/treatment goals reviewed;risks/benefits reviewed;current/potential barriers reviewed;patient/other agree to care plan  -AB       Recommendations    Therapy Frequency (Swallow) PRN  -AB    Predicted Duration Therapy Intervention (Days) until discharge  -AB    SLP Diet Recommendation NPO  -AB    Recommended Diagnostics reassess via clinical swallow evaluation  -AB    Recommended Precautions and Strategies general aspiration precautions  -AB    Oral Care Recommendations Oral Care BID/PRN  -AB    SLP Rec. for Method of Medication Administration with puree;as tolerated  -AB    Monitor for Signs of Aspiration yes;notify SLP if any concerns  -AB    Anticipated Discharge Disposition (SLP) unknown  -AB       Swallow Goals (SLP)    Swallow LTGs Swallow Long Term Goal (free text)  -AB       (LTG) Swallow    (LTG) Swallow pt will improve functional outcome measure from fois level II (nothing by mouth, minimal attempts at PO) to fois level V or higher (total oral intake of multiple consistencies requiring special preparation or compensations)  -AB    [place] (Swallow Long Term Goal) with 1:1 assist/ supervision  -AB    Time Frame (Swallow Long Term Goal) by discharge  -AB    Progress/Outcomes (Swallow Long Term Goal) new goal  -AB              User Key  (r) = Recorded By, (t) = Taken By, (c) = Cosigned By      Initials Name Effective Dates    Shyann Ortiz, MS CCC-SLP 12/27/22 -                     EDUCATION  The patient has been educated in the following areas:   Dysphagia (Swallowing Impairment) Oral Care/Hydration NPO rationale.        SLP GOALS       Row Name 09/22/23 1400             (LTG) Swallow    (LTG) Swallow pt will improve functional outcome measure from fois level II (nothing by mouth, minimal attempts at PO) to fois level V or higher (total oral intake of  multiple consistencies requiring special preparation or compensations)  -AB      [place] (Swallow Long Term Goal) with 1:1 assist/ supervision  -AB      Time Frame (Swallow Long Term Goal) by discharge  -AB      Progress/Outcomes (Swallow Long Term Goal) new goal  -AB                User Key  (r) = Recorded By, (t) = Taken By, (c) = Cosigned By      Initials Name Provider Type    Shyann Ortiz MS CCC-SLP Speech and Language Pathologist                       Time Calculation:    Time Calculation- SLP       Row Name 09/22/23 1444             Time Calculation- SLP    SLP Received On 09/22/23  -AB                User Key  (r) = Recorded By, (t) = Taken By, (c) = Cosigned By      Initials Name Provider Type    Shyann Ortiz, MS CCC-SLP Speech and Language Pathologist                    Therapy Charges for Today       Code Description Service Date Service Provider Modifiers Qty    [id number]  ST EVAL ORAL PHARYNG SWALLOW 3 9/22/2023 Shyann Chapin, MS CCC-SLP GN 1                 Shyann Chapin MS CCC-SLP  9/22/2023

## 2023-09-23 PROBLEM — F03.90 DEMENTIA WITHOUT BEHAVIORAL DISTURBANCE: Status: ACTIVE | Noted: 2023-01-01

## 2023-09-23 PROBLEM — N17.9 AKI (ACUTE KIDNEY INJURY): Status: ACTIVE | Noted: 2023-01-01

## 2023-09-23 PROBLEM — E87.20 METABOLIC ACIDOSIS: Status: ACTIVE | Noted: 2023-01-01

## 2023-09-23 PROBLEM — J69.0 ASPIRATION PNEUMONIA DUE TO VOMITUS: Status: ACTIVE | Noted: 2023-01-01

## 2023-09-23 PROBLEM — R73.9 HYPERGLYCEMIA: Status: ACTIVE | Noted: 2023-01-01

## 2023-09-23 PROBLEM — A41.9 SEPSIS: Status: ACTIVE | Noted: 2023-01-01

## 2023-09-23 PROBLEM — E87.5 HYPERKALEMIA: Status: ACTIVE | Noted: 2023-01-01

## 2023-09-23 NOTE — PLAN OF CARE
"  Problem: Adult Inpatient Plan of Care  Goal: Plan of Care Review  Outcome: Ongoing, Progressing   Goal Outcome Evaluation:               SLP re evaluated pt this date. Pt has known hx of esophageal dysphagia from prior July 2023 admit.Esophagram-results (july 14.23) iwith dysmotility, stenosis at GE junction, and hiatal hernia; EGD with dilation of stricture at GE junction. Recommendations 7.14.23 including regular/thin avoid difficult food, reflux precautions. Pt endorses poor intake, unreliable historian with most questions regarding ongoing dysphagia s/p discharge. Full feed assist required for all trials. Pt exhibited a cough and throat clearing with thin liquids via tsp. Tsp of nectar presented. Pt was able to prolongate /ah/ with clear voice. However pt complained of a burniong sensation in his throat. Once presented with the puree pt state the sensation had greatly diminished. Pt consumed the entire cup of puree with double swallows. Tsp of honey thick liquids were also unremarkable. Wife stated that pt usually is not able to toloerate po with out throwing up. Pt did complain of feeling something \"stuck\" in his throat, despite the fact that his vocal quality was very clear after prolongation of /ah/ . Pt sang in the choir and held the /ah/ for a long time with clear voice. SLP recommends NPO Treatment feeds of puree with honey thick liquids. Medication crushed in pudding. Oral care before and after po intake.         "

## 2023-09-23 NOTE — CONSULTS
INITIAL CONSULT NOTE      Patient Name: Brandon Yo  : 1935  MRN: [id number]  Primary Care Physician: Mike Covarrubias MD  Date of admission: 2023    Patient Care Team:  Mike Covarrubias MD as PCP - General (Family Medicine)  Bonita Sanchez MD as Consulting Physician (Orthopedic Surgery)        Reason for Consult:       Acute kidney injury  Subjective   History of Present Illness:   Chief Complaint:   Chief Complaint   Patient presents with    Cough     HISTORY:  Brandon Yo is a 87 y.o. male with past medical history of COPD, dementia, recurrent aspiration, with some history of emphysema, mainly admitted again with aspiration pneumonia.  Also with significant shortness of breath.  Improved initially with diuretics.  Who presents with pneumonia again patient also found to be somewhat acidotic and extreme hyperkalemia with potassium level of 5.8.  Patient creatinine was 1.1 at the time of admission increased to 1.7 yesterday today is 3 with potassium level increasing to 5.8 with significant acidosis.  Patient anion gap has increased from 10.9-22.4.  With significant WBC at 22,000.  Lactic acid level not available to me at this time.  Blood pressure that was extremely high at the time of admission dropped significantly with significant fluctuation since then        Review of systems:    Patient is not responding      Personal History:     Past Medical History:   Past Medical History:   Diagnosis Date    Acid reflux     Aspiration into respiratory tract     Chronic right shoulder pain     H/O cervical discectomy     20 years prior    Hypertension        Surgical History:      Past Surgical History:   Procedure Laterality Date    BACK SURGERY      ENDOSCOPY N/A 2023    Procedure: ESOPHAGOGASTRODUODENOSCOPY WITH BIOPSY AND BALLOON DILATATION 15-16.5MM;  Surgeon: Jimmy Amor MD;  Location: [facility] ENDOSCOPY;  Service: Gastroenterology;  Laterality: N/A;  DYSPHAGIA  POST: GASTRIC  POLYPS; ESOPHAGEAL STRICTURE; HIATAL HERNIA; GE JUNCTION INFLAMMATION       Family History: family history is not on file. Otherwise pertinent FHx was reviewed and unremarkable.     Social History:  reports that he has quit smoking. He has never used smokeless tobacco. He reports current alcohol use of about 7.0 standard drinks per week. He reports that he does not use drugs.    Medications:  Prior to Admission medications    Medication Sig Start Date End Date Taking? Authorizing Provider   aspirin 81 MG EC tablet Take 1 tablet by mouth Daily.    Christina Terry MD   donepezil (ARICEPT) 10 MG tablet Take 1 tablet by mouth Daily. 2/5/23   Christina Terry MD   furosemide (LASIX) 20 MG tablet Take 1 tablet by mouth Daily. 4/25/23   Christina Terry MD   gabapentin (NEURONTIN) 100 MG capsule Take 1 capsule by mouth 3 (Three) Times a Day.    Christina Terry MD   ipratropium-albuterol (DUO-NEB) 0.5-2.5 mg/3 ml nebulizer Take 3 mL by nebulization 4 (Four) Times a Day As Needed for Wheezing. 7/15/23   Hal Damico MD   latanoprost (XALATAN) 0.005 % ophthalmic solution Administer 1 drop to both eyes Every Night. 6/22/23   Christina Terry MD   losartan (COZAAR) 100 MG tablet Take 1 tablet by mouth Daily for 30 days. 7/16/23 8/15/23  Alirio Mendez MD   memantine (NAMENDA) 10 MG tablet Take 1 tablet by mouth 2 (Two) Times a Day. 2/5/23   Christina Terry MD   metoprolol succinate XL (TOPROL-XL) 50 MG 24 hr tablet Take 1 tablet by mouth Daily. 3/20/23   Christina Terry MD   pantoprazole (PROTONIX) 40 MG EC tablet Take 1 tablet by mouth Daily. 2/28/23   Christina Terry MD   primidone (MYSOLINE) 50 MG tablet Take 1 tablet by mouth Every Night.    Christina Terry MD     Scheduled Meds:aspirin, 81 mg, Oral, Daily  donepezil, 10 mg, Oral, Daily  gabapentin, 100 mg, Oral, Nightly  heparin (porcine), 5,000 Units, Subcutaneous, Q8H  ipratropium-albuterol, 3 mL, Nebulization, Q4H -  RT  memantine, 10 mg, Oral, BID  pantoprazole, 40 mg, Oral, Daily  [START ON 9/24/2023] piperacillin-tazobactam, 3.375 g, Intravenous, Q12H  primidone, 50 mg, Oral, Nightly  senna-docusate sodium, 2 tablet, Oral, BID  sodium chloride, 10 mL, Intravenous, Q12H  sodium zirconium cyclosilicate, 10 g, Oral, TID      Continuous Infusions:sodium chloride, 80 mL/hr      PRN Meds:  senna-docusate sodium **AND** polyethylene glycol **AND** bisacodyl **AND** bisacodyl    Calcium Replacement - Follow Nurse / BPA Driven Protocol    Magnesium Standard Dose Replacement - Follow Nurse / BPA Driven Protocol    nitroglycerin    Phosphorus Replacement - Follow Nurse / BPA Driven Protocol    Potassium Replacement - Follow Nurse / BPA Driven Protocol    sodium chloride    sodium chloride    sodium chloride  Allergies:  No Known Allergies    Objective   Exam:     Vital Signs  Temp:  [97.3 °F (36.3 °C)-98.4 °F (36.9 °C)] 97.7 °F (36.5 °C)  Heart Rate:  [84-93] 93  Resp:  [16-18] 18  BP: ()/(47-81) 135/72  SpO2:  [87 %-96 %] 95 %  on  Flow (L/min):  [1-2] 1;   Device (Oxygen Therapy): room air  Body mass index is 21.79 kg/m².  EXAM  General: Elderly white male in no acute distress.    Head:      Normocephalic and atraumatic.    Eyes:      PERRL/EOM intact, conjunctivae and sclerae clear without nystagmus.    Neck:      No masses, thyromegaly,  trachea central   Lungs:    Clear bilaterally to auscultation.    Heart:      Regular rate and rhythm, no murmur no gallop  Abd:        Soft, nontender, not distended, bowel sounds positive, no shifting dullness.  Scaphoid abdomen  Msk:        No deformity or scoliosis noted of thoracic or lumbar spine.    Pulses:   Pulses normal in all 4 extremities.    Extremities:        No cyanosis or clubbing--no edema.    Neuro:    No focal deficits.   alert oriented x3  Skin:       Intact without lesions or rashes.    Psych:    Alert and cooperative; normal mood and affect; normal attention span        Results Review:  I have personally reviewed most recent Data :  BMP @LABNT(creatinine:10)  CBC    Results from last 7 days   Lab Units 09/23/23  0458 09/22/23  0918 09/21/23 2216   WBC 10*3/mm3 19.13* 22.74* 9.37   HEMOGLOBIN g/dL 11.3* 12.6* 13.7   PLATELETS 10*3/mm3 217 256 301     CMP   Results from last 7 days   Lab Units 09/23/23  0458 09/22/23  0918 09/21/23  2216   SODIUM mmol/L 141 142 139   POTASSIUM mmol/L 5.8* 4.6 4.6   CHLORIDE mmol/L 100 102 104   CO2 mmol/L 18.6* 23.8 24.1   BUN mg/dL 63* 33* 29*   CREATININE mg/dL 3.03* 1.74* 1.15   GLUCOSE mg/dL 122* 184* 113*   ALBUMIN g/dL  --   --  4.1   BILIRUBIN mg/dL  --   --  0.3   ALK PHOS U/L  --   --  71   AST (SGOT) U/L  --   --  14   ALT (SGPT) U/L  --   --  13     ABG    Results from last 7 days   Lab Units 09/22/23  0527   PH, ARTERIAL pH units 7.435   PCO2, ARTERIAL mm Hg 35.1   PO2 ART mm Hg 76.5*   O2 SATURATION ART % 95.7   BASE EXCESS ART mmol/L -0.1*     XR Chest 2 View    Result Date: 9/21/2023  Changes of chronic interstitial lung disease and pulmonary fibrosis.  This report was finalized on 9/21/2023 11:03 PM by Dr. Marisabel Soto M.D.      CT Chest Without Contrast Diagnostic    Result Date: 9/22/2023  Bilateral lower lobe consolidations and some posterior upper lobe infiltrates. These findings are most suspicious for bilateral pneumonia superimposed upon chronic emphysematous and fibrotic changes. Additionally, the esophagus is distended with ingested material raising the concern for aspiration pneumonia. Patient has a diagnosis of esophageal stricture and the appearance of the esophagus suggests worsening of the stricture.   Radiation dose reduction techniques were utilized, including automated exposure control and exposure modulation based on body size.   This report was finalized on 9/22/2023 4:20 PM by Dr. Serafin Garner M.D.      US Renal Bilateral    Result Date: 9/23/2023  1. Small nonobstructing right renal stone. 2.  Small right renal cyst. 3. No other significant findings are noted.   This report was finalized on 9/23/2023 2:05 PM by Dr. Ruben Frost M.D.       Results for orders placed during the hospital encounter of 09/21/23    Adult Transthoracic Echo Limited W/ Cont if Necessary Per Protocol    Interpretation Summary    The study is technically suboptimal for diagnosis.    Left ventricle not well visualized. Left ventricular diastolic function is consistent with (grade I) impaired relaxation. Probably normal LV systolic function.        Assessment & Plan   Assessment and Plan:         Respiratory distress    ASSESSMENT:  Acute kidney injury etiology uncertain but it may be secondary to hemodynamics with significant fluctuation in the blood pressure  History of hypertension  Congestive heart failure with grade 1 diastolic dysfunction  Hyperkalemia secondary to YESSY  Metabolic acidosis with increased anion gap            PLAN :     Check lactic acid level  Rule out sepsis  Follow-up with repeat labs  Follow-up with a complete urine studies  Likely ATN because of the hemodynamic changes  May need ABG if not getting better  Start normal saline at 80 cc an hour  We will get ABG stat to check for acidosis  Will start bicarbonate based fluid we will give 1 amp of sodium bicarbonate IV stat.  Follow-up with repeat labs at 6 PM  Renal ultrasound did not show any obstructive uropathy as urine output is not getting better most likely renal function will get worse.        Hugh Cortes MD  [facility]  9/23/2023  15:18 EDT

## 2023-09-23 NOTE — CONSULTS
Nutrition Services    Patient Name:  Brandon Yo  YOB: 1935  MRN: [id number]  Admit Date:  9/21/2023    Assessment Date:  09/23/23    Comment: Nutrition assessment initiated due to TF consult.  Pt was admitted with resp failure, aspiration PNA, YESSY, CHF, ILD.  He has dementia, COPD, a hiatal hernia, GE junction stenosis, and dysphagia as well. He failed his swallow eval yesterday but passed today for treatment feeds only of puree/honey.  Labs, meds, skin reviewed, PI x 4. Renal following for YESSY. Plan is bumex and lokelma today to help K+ level.  Wife asking about test results - advised to ask MD.    NG Cortak placed, bridled. Will start TF's with Novasource renal at 20mL/hr.  His goal rate is 40mL/hr, min free water. Provisions below.    Will follow clinical course, nutrition needs.       CLINICAL NUTRITION ASSESSMENT      Reason for Assessment Cortrak Placement, Physician Consult, Tube Feeding Assessment    Diagnosis/Problem resp failure, aspiration PNA, YESSY, CHF, ILD.  He has dementia, COPD, a hiatal hernia, GE junction stenosis, and dysphagia   Medical & Surgical Hx Past Medical History:   Diagnosis Date    Acid reflux     Aspiration into respiratory tract     Chronic right shoulder pain     H/O cervical discectomy     20 years prior    Hypertension        Past Surgical History:   Procedure Laterality Date    BACK SURGERY      ENDOSCOPY N/A 7/13/2023    Procedure: ESOPHAGOGASTRODUODENOSCOPY WITH BIOPSY AND BALLOON DILATATION 15-16.5MM;  Surgeon: Jimmy Amor MD;  Location: [facility] ENDOSCOPY;  Service: Gastroenterology;  Laterality: N/A;  DYSPHAGIA  POST: GASTRIC POLYPS; ESOPHAGEAL STRICTURE; HIATAL HERNIA; GE JUNCTION INFLAMMATION        Current Problems      Encounter Information        Nutrition History    Food Preferences    Supplements    Factors Affecting Intake altered mental status, swallow impairment   Tests/Procedures Clinical Swallow Evaluation, CT scan     Anthropometrics   "      Current Height   Current Weight  BMI kg/m2 Height: 172.7 cm (68\")  Weight: 65 kg (143 lb 4.8 oz) (09/23/23 0530)  Body mass index is 21.79 kg/m².     Adj BMI (if applicable)    BMI Category Normal/Healthy (18.4 - 24.9)       Admission Weight 65kg   Ideal Body Weight (IBW) 68.4kg     Adj IBW (if applicable)    Usual Body Weight (UBW) 155lb in july   Weight Change/Trend Loss       Weight history: Wt Readings from Last 30 Encounters:   09/23/23 0530 65 kg (143 lb 4.8 oz)   09/22/23 1054 64.9 kg (143 lb)   09/22/23 0513 65.3 kg (143 lb 15.4 oz)   09/22/23 0200 67.6 kg (149 lb)   09/21/23 2207 67.8 kg (149 lb 8 oz)   07/09/23 2135 70.4 kg (155 lb 3.3 oz)        Estimated/Assessed Needs        Energy Requirements    Weight for Calculation 65kg   Method for Estimation  25 kcal/kg, 30 kcal/kg   EST Needs (kcal/day) 9502-3249       Protein Requirements    Weight for Calculation 65kg   EST Protein Needs (g/kg) 1.0 gm/kg   EST Daily Needs (g/day) 65       Fluid Requirements     Method for Estimation Defer to physician    Estimated Needs (mL/day)        Fluid Deficit    Current Na Level (mEq/L)    Desired Na Level (mEq/L)    Estimated Fluid Deficit (L)       Labs        Pertinent Labs    Results from last 7 days   Lab Units 09/23/23 0458 09/22/23  0918 09/21/23  2216   SODIUM mmol/L 141 142 139   POTASSIUM mmol/L 5.8* 4.6 4.6   CHLORIDE mmol/L 100 102 104   CO2 mmol/L 18.6* 23.8 24.1   BUN mg/dL 63* 33* 29*   CREATININE mg/dL 3.03* 1.74* 1.15   CALCIUM mg/dL 9.3 9.8 9.3   BILIRUBIN mg/dL  --   --  0.3   ALK PHOS U/L  --   --  71   ALT (SGPT) U/L  --   --  13   AST (SGOT) U/L  --   --  14   GLUCOSE mg/dL 122* 184* 113*     Results from last 7 days   Lab Units 09/23/23 0458 09/22/23  0918 09/21/23  2216   HEMOGLOBIN g/dL 11.3*   < > 13.7   HEMATOCRIT % 34.0*   < > 44.4   WBC 10*3/mm3 19.13*   < > 9.37   ALBUMIN g/dL  --   --  4.1    < > = values in this interval not displayed.     Results from last 7 days   Lab Units " 09/23/23  0458 09/22/23  0918 09/21/23  2216   PLATELETS 10*3/mm3 217 256 301     COVID19   Date Value Ref Range Status   09/21/2023 Not Detected Not Detected - Ref. Range Final     Lab Results   Component Value Date    HGBA1C 5.40 07/11/2023          Medications            Scheduled Medications aspirin, 81 mg, Oral, Daily  bumetanide, 1 mg, Intravenous, Once  dextrose, 25 g, Intravenous, Once  donepezil, 10 mg, Oral, Daily  gabapentin, 100 mg, Oral, Nightly  heparin (porcine), 5,000 Units, Subcutaneous, Q8H  insulin regular, 5 Units, Intravenous, Once  ipratropium-albuterol, 3 mL, Nebulization, Q4H - RT  memantine, 10 mg, Oral, BID  pantoprazole, 40 mg, Oral, Daily  piperacillin-tazobactam, 3.375 g, Intravenous, Q8H  primidone, 50 mg, Oral, Nightly  senna-docusate sodium, 2 tablet, Oral, BID  sodium chloride, 10 mL, Intravenous, Q12H  sodium zirconium cyclosilicate, 10 g, Oral, TID        Infusions      PRN Medications   senna-docusate sodium **AND** polyethylene glycol **AND** bisacodyl **AND** bisacodyl    Calcium Replacement - Follow Nurse / BPA Driven Protocol    Magnesium Standard Dose Replacement - Follow Nurse / BPA Driven Protocol    nitroglycerin    Phosphorus Replacement - Follow Nurse / BPA Driven Protocol    Potassium Replacement - Follow Nurse / BPA Driven Protocol    sodium chloride    sodium chloride    sodium chloride     Physical Findings          Physical Appearance alert, disoriented, frail, on oxygen therapy   Oral/Mouth Cavity tooth or teeth missing   Edema  no edema   Gastrointestinal fecal incontinence, last bowel movement: 9/23   Skin  pressure injury: ears, coccyx, heesl   Tubes/Drains/Lines Cortrak, NG tube, bridle in place   NFPE No clinical signs of muscle wasting or fat loss   --  Current Nutrition Orders & Evaluation of Intake       Oral Nutrition     Food Allergies NKFA   Current PO Diet NPO Diet NPO Type: Strict NPO   Supplement    PO Evaluation     Trending % PO Intake     --  Nutrition Diagnosis        Nutrition Dx Problem 1 Problem: Inadequate Oral Intake  Etiology: Medical Diagnosis - dysphagia dementia  Signs/Symptoms: Report/Observationswallow betina    Comment:      INTERVENTION / PLAN OF CARE  Intervention Goal        Intervention Goal(s) Maintain nutrition status, Meet estimated needs, Disease management/therapy, Initiate TF/PN, Tolerate TF/PN at goal, Transition TF to PO, and Maintain weight     Nutrition Intervention        RD Action Care plan reviewed and Recommend/order: EN     Prescription         Diet     Supplement/Snack    EN/PN     Prescription Ordered       Enteral Prescription:     Enteral Route NG    TF Delivery Method Continuous    Enteral Product Novasource Renal    Modular None    Propofol Rate/Kcal     TF Start Rate 20    TF Goal Rate 40    Free Water Flush 30mL q 4hrs    TF Provision at Goal: 1920 kcal, 87 gm protein, 691 mL free water + 180 mL water flushes         Calories 100 % needs met         Protein  134 % needs met         Fluid (mL) 871mL    Prescription Ordered Yes     Monitor/Evaluation        Monitor Per protocol   Discharge Plan Pending clinical course   Education Will instruct as appropriate     RD to follow per protocol.       Electronically signed by:  Helen Manzano RD  09/23/23 12:00 EDT

## 2023-09-23 NOTE — PROGRESS NOTES
"  Intensive Care Unit Daily Progress Note.   [address]  9/23/2023    Patient Name:  Brandon Yo  MRN:  [id number]  YOB: 1935  Age: 87 y.o.  Sex: male         Reason for Admission / Chief Complaint:  Respiratory distress    Hospital Course:   87-year-old male with history of chronic obstructive pulmonary disease, dementia, recurrent aspiration with interstitial changes who presented with respiratory distress initially placed on noninvasive ventilation with improvement in his respiratory status.  Transferred to the ICU for further management.  Patient transferred out of the ICU 9/22.    Interval History:  CT chest completed last night  Speech evaluation recommended n.p.o.  On 1 L nasal cannula  No acute events overnight  States that his breathing is improved  Does have a mild cough, does notice it worse after eating  Denies any nausea, vomiting, diarrhea denies any chest pain or palpitations    Physical Exam:  /66 (BP Location: Left arm, Patient Position: Lying)   Pulse 91   Temp 97.3 °F (36.3 °C) (Oral)   Resp 18   Ht 172.7 cm (68\")   Wt 65 kg (143 lb 4.8 oz)   SpO2 96%   BMI 21.79 kg/m²   Body mass index is 21.79 kg/m².    Intake/Output    Intake/Output Summary (Last 24 hours) at 9/23/2023 0852  Last data filed at 9/23/2023 0755  Gross per 24 hour   Intake 0 ml   Output --   Net 0 ml     General: Alert, nontoxic, NAD  HEENT: NC/AT, EOMI, MMM  Neck: Supple, trachea midline  Cardiac: RRR, no murmur, gallops, rubs  Pulmonary: Diminished breath sounds bilaterally, normal respiratory effort  GI: Soft, non-tender, non-distended, normal bowel sounds  Extremities: Warm, well perfused, no LE edema  Skin: no visible rash  Neuro: CN II - XII grossly intact  Psychiatry: Normal mood and affect    Data Review:  Notable Labs:  Results from last 7 days   Lab Units 09/22/23  0918 09/21/23  2216   WBC 10*3/mm3 22.74* 9.37   HEMOGLOBIN g/dL 12.6* 13.7   PLATELETS 10*3/mm3 256 301 "     Results from last 7 days   Lab Units 09/23/23  0458 09/22/23 0918 09/21/23  2216   SODIUM mmol/L 141 142 139   POTASSIUM mmol/L 5.8* 4.6 4.6   CHLORIDE mmol/L 100 102 104   CO2 mmol/L 18.6* 23.8 24.1   BUN mg/dL 63* 33* 29*   CREATININE mg/dL 3.03* 1.74* 1.15   GLUCOSE mg/dL 122* 184* 113*   CALCIUM mg/dL 9.3 9.8 9.3   Estimated Creatinine Clearance: 16.1 mL/min (A) (by C-G formula based on SCr of 3.03 mg/dL (H)).    Results from last 7 days   Lab Units 09/22/23  0918 09/21/23  2307 09/21/23  2216   AST (SGOT) U/L  --   --  14   ALT (SGPT) U/L  --   --  13   PROCALCITONIN ng/mL 1.01*  --   --    LACTATE mmol/L  --  1.5  --    PLATELETS 10*3/mm3 256  --  301       Results from last 7 days   Lab Units 09/22/23  0527   PH, ARTERIAL pH units 7.435   PCO2, ARTERIAL mm Hg 35.1   PO2 ART mm Hg 76.5*   HCO3 ART mmol/L 23.6     Imaging:  Reviewed chest images personally from past 3 days    ASSESSMENT  /  PLAN:    Acute hypoxic respiratory failure  Aspiration pneumonia  Acute kidney injury  Heart failure with preserved ejection fraction  Interstitial lung disease-possibly from chronic aspiration  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  -Continue Zosyn for pneumonia, procalcitonin elevated  -Continue with DuoNebs every 4 hours  -YESSY this AM, given medication for hyperkalemia, Nephrology consulted  -Gentle IVF initiated as patient can't take PO  - Prophylaxis Lovenox switched to heparin due to YESSY  -Home gabapentin decreased from 100 mg 3 times daily to nightly in setting of YESSY.  -Swallow evaluation recommended n.p.o.  -NG tube insertion today and nutrition consult for tube feeds.  -High-resolution CT with bilateral lower lobe consolidations suspicious for  pneumonia/aspiration.  Also with esophageal dilatation with ingested material present.  -Due to the chronic changes which may be attributed to repeated episodes of aspiration, will have to figure out a long-term plan for patient's nutrition to prevent further episodes of aspiration pneumonia and chronic lung damage.  Possibly may need PEG tube placement during this hospitalization.  -Repeat echocardiogram with preserved LVEF, mild diastolic dysfunction.    GI prophylaxis: Pantoprazole  DVT prophylaxis: SCDs  Alves catheter: No  Bowel regimen: Ordered  Diet: N.p.o.    Discharge: Floor.  Internal medicine consulted to assume care.  Pulmonology will continue to follow.  Patient will need to be discharged to his SNF once medically stable.    Markus Lynn MD  [facility]  Pulmonary and Critical Care Medicine, Interventional Pulmonology    Parts of this note may be an electronic transcription/translation of spoken language to printed text using the Dragon dictation system.

## 2023-09-23 NOTE — PLAN OF CARE
Goal Outcome Evaluation:  Plan of Care Reviewed With: patient        Progress: no change  Outcome Evaluation: AOx1 (self). VSS. Continued NPO status except meds. Gave crushed in applesauce, tolerated well. Remained on 2L NC w/sats in low-mid 90s throughout the shift. IV zosyn continued. Dressing remains intact to left heel wound. No new drainage noted.

## 2023-09-23 NOTE — CONSULTS
87-year-old male with significant past medical history of COPD, dementia, recurrent aspiration, previous CT chest showed some emphysematous chest changes.  Mainly admitted to the hospital because of the aspiration pneumonitis was on IV fluid.  This morning patient found to have an acute renal failure baseline creatinine is around 1.1 now increased to 3.03, also found to have significant hyperkalemia with potassium level of 5.8 and significant acidosis with bicarb level of 18.6. Urine output not documented    Assessment and plan  Acute kidney injury at this time etiology uncertain but may be cardiorenal at this time specially with diastolic dysfunction presence of hyperkalemia and acidosis but need to rule out obstructive uropathy  History of hypertension  History of dementia  Recent aspiration pneumonia    PLAN    We will give patient Bumex IV at this time  Lokelma already being given  Follow-up with repeat K around 2 PM  Rule out obstructive uropathy  May need a renal ultrasound stat  We will get bladder scan done

## 2023-09-23 NOTE — PLAN OF CARE
Goal Outcome Evaluation:    Progress: declining  Outcome Evaluation: Mr. Yo admitted on 9/21 for respiratory distress. Nephrology and LHA consulted. Troponin increased to 78 - per nephrology likely in relation to YESSY. K - 5.8 - Lokelma given 2x. D50W and 5 unit IV Insulin given. US Renal completed. Did not urinate, bladder scan showed 12 mL. Lactic - 2.7. ABG's drawn: pH - 7.515, pCO@ - 30.2, pO2 - 51.8. Mr. Yo on 2.5L O2 NC. Alert and oriented still to self. Fluids added. Cortrak placed - Novasource Renal ordered to start at 20 mL/hr. Cortrak occluded - awaiting response from pulmonology. Palliative consulted for goals of care. Wife visited this afternoon. Patient stable and needs met at this time.

## 2023-09-23 NOTE — CONSULTS
Name: Brandon Yo ADMIT: 2023   : 1935  PCP: Mike Covarrubias MD    MRN: [id number] LOS: 1 days   AGE/SEX: 87 y.o. male  ROOM: [facility]/     Subjective   Subjective   Referring Provider: Dr. Nance  Reason for Consultation: hypertension, hyperglycemia, assume care  Patient with a history of dementia, COPD, and dysphagia with recurrent aspiration admitted to the ICU with respiratory distress and acute hypoxic respiratory failure thought to have been due to aspiration pneumonia and possible acute on chronic diastolic CHF. He was given antibiotics and diuretics. His oxygenation has improved and he has been transferred out of the ICU. I am consulted for evaluation and ongoing management of the above medical problems and to assume care as attending.     Objective   Objective   Vital Signs  Temp:  [97.3 °F (36.3 °C)-98.4 °F (36.9 °C)] 97.7 °F (36.5 °C)  Heart Rate:  [84-93] 93  Resp:  [16-18] 18  BP: ()/(47-81) 135/72  SpO2:  [87 %-96 %] 94 %  on  Flow (L/min):  [1-2.5] 2.5;   Device (Oxygen Therapy): nasal cannula  Body mass index is 21.79 kg/m².  Physical Exam  Vitals and nursing note reviewed.   Constitutional:       General: He is not in acute distress.     Appearance: He is ill-appearing. He is not toxic-appearing or diaphoretic.   HENT:      Head: Normocephalic and atraumatic.      Nose: Nose normal.      Mouth/Throat:      Mouth: Mucous membranes are moist.      Pharynx: Oropharynx is clear.   Eyes:      Conjunctiva/sclera: Conjunctivae normal.      Pupils: Pupils are equal, round, and reactive to light.   Cardiovascular:      Rate and Rhythm: Normal rate and regular rhythm.      Pulses: Normal pulses.   Pulmonary:      Effort: Pulmonary effort is normal.      Breath sounds: Examination of the right-lower field reveals decreased breath sounds. Examination of the left-lower field reveals decreased breath sounds.   Abdominal:      General: Bowel sounds are normal.      Palpations: Abdomen is  soft.      Tenderness: There is no abdominal tenderness.   Musculoskeletal:         General: No swelling or tenderness.      Cervical back: Normal range of motion and neck supple.   Skin:     General: Skin is warm and dry.      Capillary Refill: Capillary refill takes less than 2 seconds.   Neurological:      Mental Status: He is lethargic.     Results Review     I reviewed the patient's new clinical results.  Results from last 7 days   Lab Units 09/23/23  0458 09/22/23  0918 09/21/23  2216   WBC 10*3/mm3 19.13* 22.74* 9.37   HEMOGLOBIN g/dL 11.3* 12.6* 13.7   PLATELETS 10*3/mm3 217 256 301     Results from last 7 days   Lab Units 09/23/23  1601 09/23/23  0458 09/22/23  0918 09/21/23  2216   SODIUM mmol/L  --  141 142 139   POTASSIUM mmol/L  --  5.8* 4.6 4.6   CHLORIDE mmol/L  --  100 102 104   CO2 mmol/L  --  18.6* 23.8 24.1   BUN mg/dL  --  63* 33* 29*   CREATININE mg/dL 3.96 3.03* 1.74* 1.15   GLUCOSE mg/dL  --  122* 184* 113*   EGFR mL/min/1.73  --  19.4* 37.7* 62.0     Results from last 7 days   Lab Units 09/21/23  2216   ALBUMIN g/dL 4.1   BILIRUBIN mg/dL 0.3   ALK PHOS U/L 71   AST (SGOT) U/L 14   ALT (SGPT) U/L 13     Results from last 7 days   Lab Units 09/23/23  0458 09/22/23  0918 09/21/23  2216   CALCIUM mg/dL 9.3 9.8 9.3   ALBUMIN g/dL  --   --  4.1     Results from last 7 days   Lab Units 09/23/23  1601 09/22/23  0918 09/21/23  2307   PROCALCITONIN ng/mL  --  1.01*  --    LACTATE mmol/L 2.7*  --  1.5     Glucose   Date/Time Value Ref Range Status   09/23/2023 1602 133 (H) 70 - 130 mg/dL Final   09/23/2023 1601 112 70 - 130 mg/dL Final     Comment:     Serial Number: [id number]:  [id number]   09/23/2023 1428 184 (H) 70 - 130 mg/dL Final   09/23/2023 1248 225 (H) 70 - 130 mg/dL Final   09/23/2023 1156 142 (H) 70 - 130 mg/dL Final   09/22/2023 0608 119 70 - 130 mg/dL Final       XR Chest 2 View    Result Date: 9/21/2023  Changes of chronic interstitial lung disease and pulmonary fibrosis.  This report was  finalized on 9/21/2023 11:03 PM by Dr. Marisabel Soto M.D.      CT Chest Without Contrast Diagnostic    Result Date: 9/22/2023  Bilateral lower lobe consolidations and some posterior upper lobe infiltrates. These findings are most suspicious for bilateral pneumonia superimposed upon chronic emphysematous and fibrotic changes. Additionally, the esophagus is distended with ingested material raising the concern for aspiration pneumonia. Patient has a diagnosis of esophageal stricture and the appearance of the esophagus suggests worsening of the stricture.   Radiation dose reduction techniques were utilized, including automated exposure control and exposure modulation based on body size.   This report was finalized on 9/22/2023 4:20 PM by Dr. Serafin Garner M.D.      US Renal Bilateral    Result Date: 9/23/2023  1. Small nonobstructing right renal stone. 2. Small right renal cyst. 3. No other significant findings are noted.   This report was finalized on 9/23/2023 2:05 PM by Dr. Ruben Frost M.D.       I have personally reviewed all medications:  Scheduled Medications  aspirin, 81 mg, Oral, Daily  donepezil, 10 mg, Oral, Daily  gabapentin, 100 mg, Oral, Nightly  heparin (porcine), 5,000 Units, Subcutaneous, Q8H  ipratropium-albuterol, 3 mL, Nebulization, Q4H - RT  memantine, 10 mg, Oral, BID  pantoprazole, 40 mg, Oral, Daily  [START ON 9/24/2023] piperacillin-tazobactam, 3.375 g, Intravenous, Q12H  primidone, 50 mg, Oral, Nightly  senna-docusate sodium, 2 tablet, Oral, BID  sodium chloride, 10 mL, Intravenous, Q12H  sodium zirconium cyclosilicate, 10 g, Oral, TID    Infusions  custom IV infusion builder,     Diet  NPO Diet NPO Type: Strict NPO    I have personally reviewed:  [x]  Laboratory   [x]  Microbiology   [x]  Radiology   [x]  EKG/Telemetry  []  Cardiology/Vascular   []  Pathology    [x]  Records    Assessment/Plan     Active Hospital Problems    Diagnosis  POA    **Aspiration pneumonia due to vomitus  [J69.0]  Yes    Dementia without behavioral disturbance [F03.90]  Yes    YESSY (acute kidney injury) [N17.9]  Yes    Hyperkalemia [E87.5]  Yes    Metabolic acidosis [E87.20]  Yes    Hyperglycemia [R73.9]  Yes    Respiratory distress [R06.03]  Yes    Acute respiratory failure with hypoxia [J96.01]  Yes    Esophageal dysphagia [R13.19]  Yes      Resolved Hospital Problems   No resolved problems to display.   Aspiration Pneumonia/Acute Respiratory Failure with Hypoxia  - patient has esophageal stricture and dementia with recurrent aspiration  - SLP has evaluated, unsafe for PO at this time-dobhoff tube placed and staring tube feeds  - continue on zosyn  - pulmonary toilet as able, wean oxygen as tolerated    YESSY  - unclear cause, likely related to above  - has hyperkalemia and metabolic acidosis complicating  - getting IVF with bicarbonate and IV bumex  - check renal ultrasound, PVR  - appreciate nephrology recs    HTN/Left Ventricular Diastolic Dysfunction  - he does not look peripherally volume overloaded, chest CT more consistent with pneumonia  - monitor volume status  - BP acceptable-hold losartan and metoprolol for now    Hyperglycemia  - check A1C  - cover with ssi/hypoglycemia protocol with regular insulin Q6H while getting tube feeds      Heparin SC for DVT prophylaxis.    Overall poor prognosis. Palliative care consulted.    Thank you for this consult. I will assume care as attending    Darryn Qiu MD  [facility]  09/23/23  18:02 EDT

## 2023-09-23 NOTE — THERAPY RE-EVALUATION
Acute Care - Speech Language Pathology   Swallow Re-Evaluation [facility]     Patient Name: Brandon Yo  : 1935  MRN: [id number]  Today's Date: 2023               Admit Date: 2023    Visit Dx:     ICD-10-CM ICD-9-CM   1. Respiratory distress  R06.03 786.09     Patient Active Problem List   Diagnosis    Acute respiratory failure with hypoxia    Esophageal dysphagia    Respiratory distress     Past Medical History:   Diagnosis Date    Acid reflux     Aspiration into respiratory tract     Chronic right shoulder pain     H/O cervical discectomy     20 years prior    Hypertension      Past Surgical History:   Procedure Laterality Date    BACK SURGERY      ENDOSCOPY N/A 2023    Procedure: ESOPHAGOGASTRODUODENOSCOPY WITH BIOPSY AND BALLOON DILATATION 15-16.5MM;  Surgeon: Jimmy Amor MD;  [facility] ENDOSCOPY;  Service: Gastroenterology;  Laterality: N/A;  DYSPHAGIA  POST: GASTRIC POLYPS; ESOPHAGEAL STRICTURE; HIATAL HERNIA; GE JUNCTION INFLAMMATION       SLP Recommendation and Plan  SLP Swallowing Diagnosis: R/O pharyngeal dysphagia (23)  SLP Diet Recommendation: NPO (23)  Recommended Precautions and Strategies: upright posture during/after eating, small bites of food and sips of liquid (23)  SLP Rec. for Method of Medication Administration: with puree (23)     Monitor for Signs of Aspiration: yes, notify SLP if any concerns (23)  Recommended Diagnostics: reassess via clinical swallow evaluation (23)  Swallow Criteria for Skilled Therapeutic Interventions Met: demonstrates skilled criteria (23)  Anticipated Discharge Disposition (SLP): unknown (23)  Rehab Potential/Prognosis, Swallowing: re-evaluate goals as necessary (23)  Therapy Frequency (Swallow): PRN (23)  Predicted Duration Therapy Intervention (Days): until discharge (23)  Oral Care Recommendations:  Oral Care BID/PRN (09/22/23 1524)                                      Oral Care Recommendations: Oral Care BID/PRN (09/22/23 1524)           SWALLOW EVALUATION (last 72 hours)       SLP Adult Swallow Evaluation       Row Name 09/22/23 1524 09/22/23 1400                Rehab Evaluation    Document Type -- evaluation  -AB       Subjective Information no complaints  -LS no complaints  -AB       Patient Observations alert;cooperative  -LS alert;cooperative  -AB       Patient/Family/Caregiver Comments/Observations -- seen in cicu 451  -AB       Patient Effort good  -LS good  -AB       Symptoms Noted During/After Treatment none  -LS none  -AB       Symptoms Noted, Comment -- --  -AB          General Information    Patient Profile Reviewed -- yes  -AB       Pertinent History Of Current Problem 87-year-old gentleman with history of COPD dementia and recurrent aspiration. Previous CT chest Showed emphysematous changes. Patient has also had a sniff test done due to chronic right hemidiaphragm showing normal movement of bilateral diaphragm. Last admitted July with similar episode of suspected aspiration pneumonitis due to dysphagia with underlying COPD. I was called by [facility] and was told that patient was in acute respiratory distress on arrival to the ER and was placed on noninvasive ventilation 100% FiO2. Patient was given bronchodilators and Lasix in the ER and subsequently was transferred to [facility]. He is currently on nasal cannula oxygen. Due to his underlying dementia he is not the best of historian. Currently requiring 2 L oxygen nasal cannula. He reports shortness of breath at rest. Denies some cough. No clear history of fever or chills. Denies any chest pain.  -LS 87-year-old gentleman with history of COPD dementia and recurrent aspiration.  Previous CT chest Showed emphysematous changes.  Patient has also had a sniff test done due to chronic right hemidiaphragm showing normal movement of  bilateral diaphragm.  Last admitted July with similar episode of suspected aspiration pneumonitis due to dysphagia with underlying COPD.  I was called by [facility] physician and was told that patient was in acute respiratory distress on arrival to the ER and was placed on noninvasive ventilation 100% FiO2.  Patient was given bronchodilators and Lasix in the ER and subsequently was transferred to [facility] ICU.  He is currently on nasal cannula oxygen.  Due to his underlying dementia he is not the best of historian.  Currently requiring 2 L oxygen nasal cannula.  He reports shortness of breath at rest.  Denies some cough.  No clear history of fever or chills.  Denies any chest pain.  -AB       Current Method of Nutrition NPO  -LS NPO  -AB       Precautions/Limitations, Vision WFL  -LS WFL  -AB       Precautions/Limitations, Hearing WFL  -LS WFL  -AB       Prior Level of Function-Communication --  dementia.  -LS cognitive-linguistic impairment  dementia  -AB       Prior Level of Function-Swallowing esophageal concerns;regular textures;thin liquids  -LS esophageal concerns;regular textures;thin liquids  -AB       Plans/Goals Discussed with patient and family  -LS patient  -AB       Barriers to Rehab medically complex  -LS medically complex;previous functional deficit  -AB       Patient's Goals for Discharge patient did not state  -LS patient did not state  -AB          Oral Motor Structure and Function    Oral Lesions or Structural Abnormalities and/or variants -- none  -AB       Dentition Assessment natural, present and adequate  -LS natural, present and adequate  -AB       Secretion Management WNL/WFL  -LS WNL/WFL  -AB       Mucosal Quality moist, healthy  -LS moist, healthy  -AB       Gag Response -- --  dnt  -AB       Volitional Swallow WFL  -LS WFL  -AB       Volitional Cough -- weak  -AB          Oral Musculature and Cranial Nerve Assessment    Oral Motor General Assessment generalized oral motor weakness  -LS  "lingual impairment  -AB       Lingual Impairment, Detail. Cranial Nerves IX, XII (Glossopharyngeal and Hypoglossal) -- reduced lingual ROM  tremulous at rest  -AB          General Eating/Swallowing Observations    Respiratory Support Currently in Use nasal cannula  -LS nasal cannula  -AB       Eating/Swallowing Skills fed by SLP  -LS fed by SLP  -AB       Positioning During Eating upright 90 degree  -LS upright 90 degree  -AB       Utensils Used spoon;cup  -LS spoon;straw  -AB       Consistencies Trialed pureed;thin liquids;nectar/syrup-thick liquids;honey-thick liquids  -LS ice chips;thin liquids;pureed;nectar/syrup-thick liquids;honey-thick liquids  -AB          Clinical Swallow Eval    Clinical Swallow Evaluation Summary SLP re evaluated pt this date.  Pt has known hx of esophageal dysphagia from prior July 2023 admit.Esophagram-results (july 14.23) iwith dysmotility, stenosis at GE junction, and hiatal hernia; EGD with dilation of stricture at GE junction. Recommendations 7.14.23 including regular/thin avoid difficult food, reflux precautions. Pt endorses poor intake, unreliable historian with most questions regarding ongoing dysphagia s/p discharge. Full feed assist required for all trials. Pt exhibited a cough and throat clearing with thin liquids via tsp.  Tsp of nectar presented.  Pt was able to prolongate /ah/ with clear voice.  However pt complained of a burniong sensation  in his throat.  Once presented with the puree pt state the sensation had greatly diminished. Pt consumed the entire cup of puree with double swallows.  Tsp of honey thick liquids were also unremarkable.  Wife stated that pt usually is not able to toloerate po with out throwing up. Pt did complain of  feeling something \"stuck\" in his throat,  despite the fact that his vocal quality was very clear after prolongation of /ah/ .  Pt sang in the choir and held the /ah/  for a long time. SLP recommends NPO with treatment feeds of puree with " "honey thick liquids.  Medication crushed in pudding.  -LS SLP orders received and appreciated per pulmonology secondary to concern for aspiration component of respiratory distress, known hx of esophageal dysphagia from prior July 2023 admit. Pt seen for bedside swallow evaluation;esophagram-results with dysmotility, stenosis at GE junction, and hiatal hernia; EGD with dilation of stricture at GE junction. Recommendations 7.14.23 including regular/thin avoid difficult food, reflux precautions. Pt endorses poor intake, unreliable historian with most questions regarding ongoing dysphagia s/p discharge. Full feed assist required for all trials. Pt with adequate labial seal on tsp and straw, cup deferred. Timely transit puree and liquids. Multiple swallows visualized across consistencies, appear increased for heavier bolus (up to 3-4 for puree). Throat clear present following x1 water by tsp, x2 water by straw, x2 NTL by tsp, x1 HTL by tsp, x1/3 puree. Cough x1 water by straw. Inconsistent belching. Pt c/o globus following x3 trials puree and odynophagia x1 with thin water by straw, stating \"It's too cold it hurts, \" indication near substernal region.     Recommend: NPO with non-oral means of nutrition/hydration. Pt may have minimal trials meds whole or crushed in puree.     SLP to re-evaluate at bedside with further recommendations pending.  -AB          SLP Evaluation Clinical Impression    SLP Swallowing Diagnosis R/O pharyngeal dysphagia  -LS R/O pharyngeal dysphagia;suspected esophageal dysphagia  -AB       Functional Impact risk of aspiration/pneumonia  -LS risk of aspiration/pneumonia;risk of malnutrition;risk of dehydration  -AB       Rehab Potential/Prognosis, Swallowing re-evaluate goals as necessary  -LS re-evaluate goals as necessary  -AB       Swallow Criteria for Skilled Therapeutic Interventions Met demonstrates skilled criteria  -LS demonstrates skilled criteria  -AB          SLP Treatment Clinical " Impressions    Care Plan Review evaluation/treatment results reviewed  -LS evaluation/treatment results reviewed;care plan/treatment goals reviewed;risks/benefits reviewed;current/potential barriers reviewed;patient/other agree to care plan  -AB          Recommendations    Therapy Frequency (Swallow) PRN  -LS PRN  -AB       Predicted Duration Therapy Intervention (Days) until discharge  -LS until discharge  -AB       SLP Diet Recommendation NPO  -LS NPO  -AB       Recommended Diagnostics reassess via clinical swallow evaluation  -LS reassess via clinical swallow evaluation  -AB       Recommended Precautions and Strategies upright posture during/after eating;small bites of food and sips of liquid  -LS general aspiration precautions  -AB       Oral Care Recommendations Oral Care BID/PRN  -LS Oral Care BID/PRN  -AB       SLP Rec. for Method of Medication Administration with puree  -LS with puree;as tolerated  -AB       Monitor for Signs of Aspiration yes;notify SLP if any concerns  -LS yes;notify SLP if any concerns  -AB       Anticipated Discharge Disposition (SLP) unknown  -LS unknown  -AB          Swallow Goals (SLP)    Swallow LTGs -- Swallow Long Term Goal (free text)  -AB          (LTG) Swallow    (LTG) Swallow Pt will tolerate treatment feeds of puree with honey thick liquids withoiut difficulty.  -LS pt will improve functional outcome measure from fois level II (nothing by mouth, minimal attempts at PO) to fois level V or higher (total oral intake of multiple consistencies requiring special preparation or compensations)  -AB       [place] (Swallow Long Term Goal) with 1:1 assist/ supervision  -LS with 1:1 assist/ supervision  -AB       Time Frame (Swallow Long Term Goal) by discharge  -LS by discharge  -AB       Progress/Outcomes (Swallow Long Term Goal) -- new goal  -AB                 User Key  (r) = Recorded By, (t) = Taken By, (c) = Cosigned By      Initials Name Effective Dates    Emmanuel Tolliver  MS CCC-SLP 06/16/21 -     AB Shyann Chapin, MS CCC-SLP 12/27/22 -                     EDUCATION  The patient has been educated in the following areas:   Dysphagia (Swallowing Impairment).        SLP GOALS       Row Name 09/22/23 1524 09/22/23 1400          (LTG) Swallow    (LTG) Swallow Pt will tolerate treatment feeds of puree with honey thick liquids withoiut difficulty.  -LS pt will improve functional outcome measure from fois level II (nothing by mouth, minimal attempts at PO) to fois level V or higher (total oral intake of multiple consistencies requiring special preparation or compensations)  -AB     [place] (Swallow Long Term Goal) with 1:1 assist/ supervision  -LS with 1:1 assist/ supervision  -AB     Time Frame (Swallow Long Term Goal) by discharge  -LS by discharge  -AB     Progress/Outcomes (Swallow Long Term Goal) -- new goal  -AB               User Key  (r) = Recorded By, (t) = Taken By, (c) = Cosigned By      Initials Name Provider Type    LS Emmanuel Umana MS CCC-SLP Speech and Language Pathologist    AB Shyann Chapin, MS CCC-SLP Speech and Language Pathologist                       Time Calculation:       Therapy Charges for Today       Code Description Service Date Service Provider Modifiers Qty    [id number] HC ST TREATMENT SWALLOW 5 9/23/2023 Emmanuel Umana MS CCC-SLP GN 1                 Emmanuel Umana MS CCC-SLP  9/23/2023

## 2023-09-24 NOTE — CONSULTS
Gastroenterology   Initial Inpatient Consult Note  Thank you for asking opinion on this patient.  Referring Provider: Rogelio Momin MD    Reason for Consultation: Transaminitis    Subjective     History of present illness:    87 y.o. male that we are asked to see for acute rise in liver enzymes with transaminases in the 5-6000 range with jaundice.  Patient was initially admitted to the ICU with respiratory distress and acute hypoxic respiratory failure possibly due to aspiration pneumonia with a component of CHF.  He responded to antibiotics and diuretics with improvement of his oxygenation.  He was then transferred out of the ICU to the floor.  This morning, he appears to be decompensating with drop in blood pressure with a systolic in the 90s with increasing oxygen requirements with shortness of breath.  Labs from this morning showed significant rise in transaminases as well as new elevation in bilirubin.  His INR has also prolonged to a level of 2.04.  His ammonia level is normal.  Hepatitis panel is negative and Tylenol level is pending.  Hepatic Doppler studies pending.    Past Medical History:  Past Medical History:   Diagnosis Date    Acid reflux     Aspiration into respiratory tract     Chronic right shoulder pain     H/O cervical discectomy     20 years prior    Hypertension      Past Surgical History:  Past Surgical History:   Procedure Laterality Date    BACK SURGERY      ENDOSCOPY N/A 7/13/2023    Procedure: ESOPHAGOGASTRODUODENOSCOPY WITH BIOPSY AND BALLOON DILATATION 15-16.5MM;  Surgeon: Jimmy Amor MD;  Location: [facility] ENDOSCOPY;  Service: Gastroenterology;  Laterality: N/A;  DYSPHAGIA  POST: GASTRIC POLYPS; ESOPHAGEAL STRICTURE; HIATAL HERNIA; GE JUNCTION INFLAMMATION      Social History:   Social History     Tobacco Use    Smoking status: Former    Smokeless tobacco: Never   Substance Use Topics    Alcohol use: Yes     Alcohol/week: 7.0 standard drinks     Types: 7 Shots of liquor per  week     Comment: occ      Family History:  History reviewed. No pertinent family history.    Home Meds:  Medications Prior to Admission   Medication Sig Dispense Refill Last Dose    aspirin 81 MG EC tablet Take 1 tablet by mouth Daily.       donepezil (ARICEPT) 10 MG tablet Take 1 tablet by mouth Daily.       furosemide (LASIX) 20 MG tablet Take 1 tablet by mouth Daily.       gabapentin (NEURONTIN) 100 MG capsule Take 1 capsule by mouth 3 (Three) Times a Day.       ipratropium-albuterol (DUO-NEB) 0.5-2.5 mg/3 ml nebulizer Take 3 mL by nebulization 4 (Four) Times a Day As Needed for Wheezing. 120 mL 0     latanoprost (XALATAN) 0.005 % ophthalmic solution Administer 1 drop to both eyes Every Night.       losartan (COZAAR) 100 MG tablet Take 1 tablet by mouth Daily for 30 days. 30 tablet 0     memantine (NAMENDA) 10 MG tablet Take 1 tablet by mouth 2 (Two) Times a Day.       metoprolol succinate XL (TOPROL-XL) 50 MG 24 hr tablet Take 1 tablet by mouth Daily.       pantoprazole (PROTONIX) 40 MG EC tablet Take 1 tablet by mouth Daily.       primidone (MYSOLINE) 50 MG tablet Take 1 tablet by mouth Every Night.        Current Meds:   aspirin, 81 mg, Oral, Daily  heparin (porcine), 5,000 Units, Subcutaneous, Q8H  ipratropium-albuterol, 3 mL, Nebulization, Q4H - RT  pantoprazole, 40 mg, Intravenous, Q AM  piperacillin-tazobactam, 3.375 g, Intravenous, Q12H  primidone, 50 mg, Oral, Nightly  senna-docusate sodium, 2 tablet, Oral, BID  sodium chloride, 10 mL, Intravenous, Q12H  sodium zirconium cyclosilicate, 10 g, Oral, TID      Allergies:  No Known Allergies  Review of Systems  Pertinent items are noted in HPI, all other systems reviewed and negative    Objective     Vital Signs  Temp:  [97.5 °F (36.4 °C)-98.2 °F (36.8 °C)] 97.8 °F (36.6 °C)  Heart Rate:  [] 97  Resp:  [16-22] 22  BP: ()/(48-81) 133/59    Physical Exam:  CONSTITUTIONAL:  today's vital signs reviewed,   EARS NOSE THROAT: trachea midline and no  deformity of the nares, Dobbhoff tube in place  EYES: + scleral icterus  GASTROINTESTINAL: abdomen is soft, nontender, nondistended with normal active bowel sounds, no masses are appreciated  PSYCHIATRIC: Unable to assess, confused  RESPIRATORY:  increased work of breathing  NEUROLOGIC: patient is awake but lethargic and does not respond to questions  DERMATOLOGIC: skin is warm with no cyanosis  LYMPHATIC: no periumbilical lymphadenopathy     Results Review:   I reviewed the patient's new clinical results.    Results from last 7 days   Lab Units 09/24/23  0443 09/23/23  0458 09/22/23  0918   WBC 10*3/mm3 23.44* 19.13* 22.74*   HEMOGLOBIN g/dL 10.6* 11.3* 12.6*   HEMATOCRIT % 32.0* 34.0* 38.0   PLATELETS 10*3/mm3 273 217 256     Results from last 7 days   Lab Units 09/24/23  0856 09/24/23  0744 09/23/23  1840 09/23/23  1601 09/23/23  0458 09/22/23  0918 09/21/23  2216   SODIUM mmol/L  --  144 141  --  141   < > 139   POTASSIUM mmol/L  --  4.4 4.7  --  5.8*   < > 4.6   CHLORIDE mmol/L  --  100 99  --  100   < > 104   CO2 mmol/L  --  19.0* 19.0*  --  18.6*   < > 24.1   BUN mg/dL  --  96* 82*  --  63*   < > 29*   CREATININE mg/dL 5.47 5.52* 4.42*   < > 3.03*   < > 1.15   CALCIUM mg/dL  --  7.9* 8.4*  --  9.3   < > 9.3   BILIRUBIN mg/dL  --  6.4*  --   --   --   --  0.3   ALK PHOS U/L  --  107  --   --   --   --  71   ALT (SGPT) U/L  --  5,829*  --   --   --   --  13   AST (SGOT) U/L  --  6,716*  --   --   --   --  14   GLUCOSE mg/dL  --  82 101*  --  122*   < > 113*    < > = values in this interval not displayed.     Results from last 7 days   Lab Units 09/24/23  1021   INR  2.04*     No results found for: LIPASE    Radiology:  US Liver   Final Result       No discrete liver lesion or biliary ductal dilatation identified.   Cholelithiasis. No evidence for acute cholecystitis. If there is further   clinical concern, enhanced liver imaging can be obtained for further   evaluation.       This report was finalized on  9/24/2023 10:49 AM by Dr. Jaylen Strickland M.D.          XR Chest 1 View   Final Result   Mild prominence of vascular interstitial markings is seen.   Increased patchy opacities at the mid to lower lungs may represent edema   and/or pneumonia, clinical correlation and follow-up recommended.   Tortuous aorta.       This report was finalized on 9/24/2023 10:15 AM by Dr. Jaylen Strickland M.D.          US Renal Bilateral   Final Result   1. Small nonobstructing right renal stone.   2. Small right renal cyst.   3. No other significant findings are noted.           This report was finalized on 9/23/2023 2:05 PM by Dr. Ruben Frost M.D.          CT Chest Without Contrast Diagnostic   Final Result   Bilateral lower lobe consolidations and some posterior upper lobe   infiltrates. These findings are most suspicious for bilateral pneumonia   superimposed upon chronic emphysematous and fibrotic changes.   Additionally, the esophagus is distended with ingested material raising   the concern for aspiration pneumonia. Patient has a diagnosis of   esophageal stricture and the appearance of the esophagus suggests   worsening of the stricture.           Radiation dose reduction techniques were utilized, including automated   exposure control and exposure modulation based on body size.           This report was finalized on 9/22/2023 4:20 PM by Dr. Serafin Garner M.D.          XR Chest 2 View   Final Result   Changes of chronic interstitial lung disease and pulmonary fibrosis.       This report was finalized on 9/21/2023 11:03 PM by Dr. Marisabel Soto M.D.          CT Abdomen Pelvis Stone Protocol    (Results Pending)       Assessment & Plan   Active Hospital Problems    Diagnosis     **Aspiration pneumonia due to vomitus     Dementia without behavioral disturbance     YESSY (acute kidney injury)     Hyperkalemia     Metabolic acidosis     Hyperglycemia     Respiratory distress     Acute respiratory failure with  hypoxia     Esophageal dysphagia        Assessment:  Transaminitis.  This is a new and sudden rise in transaminases.  Most likely etiologies include drug-induced liver injury versus ischemic hepatopathy versus effect of sepsis.  Acute hepatitis panel negative.  Doubt autoimmune hepatitis.  Patient's ammonia level is normal.  No evidence of hepatic encephalopathy to define acute liver failure.  Concerning with his respiratory distress and acute kidney injury with elevated lactic acid levels for developing multisystem organ failure.  Hyperbilirubinemia  Dysphagia.  Currently receiving tube feeds.  Acute renal failure  Respiratory distress now requiring high levels of supplemental oxygen  History of esophageal stricture status post dilation    Plan:  Await results of labs including Tylenol level  Follow-up hepatic duplex to rule out hepatic or portal vein thrombosis.  Supportive care  Palliative care consult ordered by primary team.  Monitor liver enzymes, ammonia level and coags  Tube feeds as tolerated      I discussed the patients findings and my recommendations with patient and nursing staff.    MD Jimmy Ellsworth M.D.  [facility] [place] [address]  Office: [phone]

## 2023-09-24 NOTE — PROGRESS NOTES
PROGRESS NOTE      Patient Name: Brandon Yo  : 1935  MRN: [id number]  Primary Care Physician: Mike Covarrubias MD  Date of admission: 2023    Patient Care Team:  Mike Covarrubias MD as PCP - General (Family Medicine)  Bonita Sanchez MD as Consulting Physician (Orthopedic Surgery)        Subjective   Subjective:     Patient is little bit more alert not any acute distress  Review of systems:  All other review of system unremarkable      Allergies:  No Known Allergies    Objective   Exam:     Vital Signs  Temp:  [97.5 °F (36.4 °C)-98.4 °F (36.9 °C)] 98.4 °F (36.9 °C)  Heart Rate:  [] 92  Resp:  [16-22] 22  BP: ()/(48-74) 122/56  SpO2:  [91 %-99 %] 97 %  on  Flow (L/min):  [1.5-3] 2;   Device (Oxygen Therapy): nasal cannula  Body mass index is 22.53 kg/m².    General: Elderly male in no acute distress.    Head:      Normocephalic and atraumatic.  Has nasogastric tube  Eyes:      PERRL/EOM intact, conjunctiva and sclera clear with out nystagmus.    Neck:      No masses, thyromegaly,  trachea central with normal respiratory effort   Lungs:    Clear bilaterally to auscultation.    Heart:      Regular rate and rhythm, no murmur no gallop  Abd:        Soft, mild discomfort, not distended, bowel sounds positive, no shifting dullness, scaphoid  Pulses:   Pulses palpable  Extr:        No cyanosis or clubbing--no edema.    Neuro:    No focal deficits.   alert oriented x3  Skin:       Intact without lesions or rashes.    Psych:    Alert and cooperative; normal mood and affect; .      Results Review:  I have personally reviewed most recent Data :  CBC    Results from last 7 days   Lab Units 23  0443 23  0458 23  0918 23  2216   WBC 10*3/mm3 23.44* 19.13* 22.74* 9.37   HEMOGLOBIN g/dL 10.6* 11.3* 12.6* 13.7   PLATELETS 10*3/mm3 273 217 256 301     CMP   Results from last 7 days   Lab Units 23  0953 23  0856 23  0744 23  1840 23  1601  09/23/23  0458 09/22/23  0918 09/21/23  2216   SODIUM mmol/L  --   --  144 141  --  141 142 139   POTASSIUM mmol/L  --   --  4.4 4.7  --  5.8* 4.6 4.6   CHLORIDE mmol/L  --   --  100 99  --  100 102 104   CO2 mmol/L  --   --  19.0* 19.0*  --  18.6* 23.8 24.1   BUN mg/dL  --   --  96* 82*  --  63* 33* 29*   CREATININE mg/dL  --  5.47 5.52* 4.42* 3.96 3.03* 1.74* 1.15   GLUCOSE mg/dL  --   --  82 101*  --  122* 184* 113*   ALBUMIN g/dL  --   --  2.7*  --   --   --   --  4.1   BILIRUBIN mg/dL  --   --  6.6*  6.4*  --   --   --   --  0.3   ALK PHOS U/L  --   --  107  --   --   --   --  71   AST (SGOT) U/L  --   --  6,716*  --   --   --   --  14   ALT (SGPT) U/L  --   --  5,829*  --   --   --   --  13   AMMONIA umol/L 43  --   --   --   --   --   --   --      ABG    Results from last 7 days   Lab Units 09/24/23  0856 09/23/23  1601 09/22/23  0527   PH, ARTERIAL pH units 7.510* 7.515* 7.435   PCO2, ARTERIAL mm Hg 29.6* 30.2* 35.1   PO2 ART mm Hg 78.8* 51.8* 76.5*   O2 SATURATION ART % 96.9 90.1* 95.7   BASE EXCESS ART mmol/L 0.9 1.8 -0.1*     US Liver    Result Date: 9/24/2023   No discrete liver lesion or biliary ductal dilatation identified. Cholelithiasis. No evidence for acute cholecystitis. If there is further clinical concern, enhanced liver imaging can be obtained for further evaluation.  This report was finalized on 9/24/2023 10:49 AM by Dr. Jaylen Strickland M.D.      XR Chest 1 View    Result Date: 9/24/2023  Mild prominence of vascular interstitial markings is seen. Increased patchy opacities at the mid to lower lungs may represent edema and/or pneumonia, clinical correlation and follow-up recommended. Tortuous aorta.  This report was finalized on 9/24/2023 10:15 AM by Dr. Jaylen Strickland M.D.      US Renal Bilateral    Result Date: 9/23/2023  1. Small nonobstructing right renal stone. 2. Small right renal cyst. 3. No other significant findings are noted.   This report was finalized on 9/23/2023 2:05 PM by  Dr. Ruben Frost M.D.       Results for orders placed during the hospital encounter of 09/21/23    Adult Transthoracic Echo Limited W/ Cont if Necessary Per Protocol    Interpretation Summary    The study is technically suboptimal for diagnosis.    Left ventricle not well visualized. Left ventricular diastolic function is consistent with (grade I) impaired relaxation. Probably normal LV systolic function.    Scheduled Meds:aspirin, 81 mg, Oral, Daily  heparin (porcine), 5,000 Units, Subcutaneous, Q8H  ipratropium-albuterol, 3 mL, Nebulization, Q4H - RT  pantoprazole, 40 mg, Intravenous, Q AM  piperacillin-tazobactam, 3.375 g, Intravenous, Q12H  primidone, 50 mg, Oral, Nightly  senna-docusate sodium, 2 tablet, Oral, BID  sodium chloride, 10 mL, Intravenous, Q12H  sodium zirconium cyclosilicate, 10 g, Oral, TID      Continuous Infusions:custom IV infusion builder, , Last Rate: 100 mL/hr at 09/24/23 0500      PRN Meds:  senna-docusate sodium **AND** polyethylene glycol **AND** bisacodyl **AND** bisacodyl    Calcium Replacement - Follow Nurse / BPA Driven Protocol    Magnesium Standard Dose Replacement - Follow Nurse / BPA Driven Protocol    nitroglycerin    Phosphorus Replacement - Follow Nurse / BPA Driven Protocol    Potassium Replacement - Follow Nurse / BPA Driven Protocol    sodium chloride    sodium chloride    sodium chloride    Assessment & Plan   Assessment and Plan:         Aspiration pneumonia due to vomitus    Acute respiratory failure with hypoxia    Esophageal dysphagia    Respiratory distress    Dementia without behavioral disturbance    YESSY (acute kidney injury)    Hyperkalemia    Metabolic acidosis    Hyperglycemia    ASSESSMENT:  Acute kidney injury etiology uncertain but it may be secondary to hemodynamics with significant fluctuation in the blood pressure  History of hypertension  Congestive heart failure with grade 1 diastolic dysfunction  Hyperkalemia secondary to YESSY  Metabolic acidosis with  increased anion gap  Shock liver    PLAN :      Patient behaving like dense ATN with shock liver and consistent with significant hemodynamic changes that might have happened.  Renal function will take time to recover there is a possibility of hemodialysis  Acidosis slowly improving anion gap is still at 25 which is significantly high  Still significant leukocytosis may need to rule out C. difficile colitis although patient has no diarrhea  Follow-up with repeat labs  Patient to have significant proteinuria with hematuria with active sediment if not getting better may need a renal biopsy but at this time because of underlying dementia because of his age and patient being DNR family do not want to be that aggressive  Follow-up with a complete urine studies  Follow-up with repeat labs at 5 PM  We will get CT stone protocol follow-up with the results      Electronically signed by Hugh Cortes MD,   [facility] kidney consultant  [phone]  9/24/2023  14:01 EDT

## 2023-09-24 NOTE — PROGRESS NOTES
Dedicated to Hospital Care    [phone]   LOS: 2 days     Name: Brandon Yo  Age/Sex: 87 y.o. male  :  1935        PCP: Mike Covarrubias MD  Chief Complaint   Patient presents with    Cough      Subjective   Notified by nursing this morning that he screen positive for sepsis.  The patient is awake and alert but difficult to get much of a history from.  He has been mildly tachycardic and white blood cell count remains elevated.  Unable to assess review of systems    aspirin, 81 mg, Oral, Daily  donepezil, 10 mg, Oral, Daily  gabapentin, 100 mg, Oral, Nightly  heparin (porcine), 5,000 Units, Subcutaneous, Q8H  ipratropium-albuterol, 3 mL, Nebulization, Q4H - RT  memantine, 10 mg, Oral, BID  pantoprazole, 40 mg, Oral, Daily  piperacillin-tazobactam, 3.375 g, Intravenous, Q12H  primidone, 50 mg, Oral, Nightly  senna-docusate sodium, 2 tablet, Oral, BID  sodium chloride, 10 mL, Intravenous, Q12H  sodium zirconium cyclosilicate, 10 g, Oral, TID      custom IV infusion builder, , Last Rate: 100 mL/hr at 23 0500        Objective   Vital Signs  Temp:  [97.3 °F (36.3 °C)-98.2 °F (36.8 °C)] 98.2 °F (36.8 °C)  Heart Rate:  [91-97] 91  Resp:  [16-20] 16  BP: (109-135)/(58-81) 128/58  Body mass index is 21.55 kg/m².    Intake/Output Summary (Last 24 hours) at 2023 0736  Last data filed at 2023 0505  Gross per 24 hour   Intake 390 ml   Output 0 ml   Net 390 ml       Physical Exam  Vitals and nursing note reviewed.   Constitutional:       General: He is in acute distress.      Appearance: He is ill-appearing.   Cardiovascular:      Rate and Rhythm: Regular rhythm. Tachycardia present.   Pulmonary:      Effort: No respiratory distress.   Abdominal:      General: Bowel sounds are normal.      Palpations: Abdomen is soft.   Neurological:      Mental Status: He is alert.      Comments: Not really sure what his baseline is but he seems somewhat lethargic today.         Results Review:       I reviewed  the patient's new clinical results.  Results from last 7 days   Lab Units 09/24/23  0443 09/23/23  0458 09/22/23  0918 09/21/23  2216   WBC 10*3/mm3 23.44* 19.13* 22.74* 9.37   HEMOGLOBIN g/dL 10.6* 11.3* 12.6* 13.7   PLATELETS 10*3/mm3 273 217 256 301     Results from last 7 days   Lab Units 09/24/23  0856 09/24/23  0744 09/24/23  0443 09/23/23  1840 09/23/23  1601 09/23/23  0458 09/22/23  0918 09/21/23  2216   SODIUM mmol/L  --  144  --  141  --  141 142 139   POTASSIUM mmol/L  --  4.4  --  4.7  --  5.8* 4.6 4.6   CHLORIDE mmol/L  --  100  --  99  --  100 102 104   CO2 mmol/L  --  19.0*  --  19.0*  --  18.6* 23.8 24.1   BUN mg/dL  --  96*  --  82*  --  63* 33* 29*   CREATININE mg/dL 5.47 5.52*  --  4.42* 3.96 3.03* 1.74* 1.15   CALCIUM mg/dL  --  7.9*  --  8.4*  --  9.3 9.8 9.3   MAGNESIUM mg/dL  --   --  1.8  --   --   --   --   --    PHOSPHORUS mg/dL  --   --  5.5*  --   --   --   --   --    Estimated Creatinine Clearance: 10.9 mL/min (A) (by C-G formula based on SCr of 4.42 mg/dL (H)).  Results from last 7 days   Lab Units 09/24/23  0744 09/21/23 2216   ALK PHOS U/L 107 71   BILIRUBIN mg/dL 6.4* 0.3   ALT (SGPT) U/L 5,829* 13   AST (SGOT) U/L 6,716* 14         Assessment & Plan   Active Hospital Problems    Diagnosis  POA    **Aspiration pneumonia due to vomitus [J69.0]  Yes    Dementia without behavioral disturbance [F03.90]  Yes    YESSY (acute kidney injury) [N17.9]  Yes    Hyperkalemia [E87.5]  Yes    Metabolic acidosis [E87.20]  Yes    Hyperglycemia [R73.9]  Yes    Respiratory distress [R06.03]  Yes    Acute respiratory failure with hypoxia [J96.01]  Yes    Esophageal dysphagia [R13.19]  Yes      Resolved Hospital Problems   No resolved problems to display.       PLAN  This is an 87-year-old gentleman with a history of dementia, COPD, and dysphagia with recurrent aspiration admitted to the ICU with respiratory distress and acute hypoxic respiratory failure thought to have been due to aspiration pneumonia  and possible acute on chronic diastolic CHF. He was given antibiotics and diuretics. His oxygenation has improved and he has been transferred out of the ICU.   -His renal function is worsened today his liver function tests are in the thousands.  -Clinically does not look that great.  I have seen him shortly after the pulmonologist has and they are planning on taking him back to the ICU today.  I certainly feel that is an appropriate change in care at this point..  -He remains on IV antibiotics but his white blood cell count remains significantly elevated.  -Continuing Dobbhoff tube and tube feedings for now to maintain ability to give p.o. medication and adequate nutrition.  -I did review his advance directive and living will.  He does have underlying dementia and does have other medical comorbidities.  He would not want long-term feedings per his living will.  I do think palliative care consultation is indicated in this situation to discuss ongoing goals of care and recommendations.  I have placed that consultation.      Disposition  Expected discharge date/ time has not been documented.       Rogelio Momin MD  [facility]  09/24/23  07:36 EDT

## 2023-09-24 NOTE — PROGRESS NOTES
Pulmonary/critical care    Bed nursing to bedside patient  in SVT in the 170s pressure was not too bad he was not feeling really well nothing specific however.  Went ahead and gave him 6 of adenosine and he converted back to a sinus rhythm and looks like he is maintaining sinus.

## 2023-09-24 NOTE — SIGNIFICANT NOTE
09/24/23 1207   OTHER   Discipline physical therapist   Rehab Time/Intention   Session Not Performed other (see comments)  (Per nursing request, pt is septic, not doing well and likely transferring to ICU today. Will check back tomorrow.)   Recommendation   PT - Next Appointment 09/25/23

## 2023-09-24 NOTE — NURSING NOTE
HR sustaining in 170s. Dr Dallas paged- at bedside order to push Adenosine- med given- HR now 90-110s. Pt states he feels better. Wife updated.    CT abd/pelvis done earlier in shift- still waiting on results.

## 2023-09-24 NOTE — PLAN OF CARE
Goal Outcome Evaluation:  Plan of Care Reviewed With: patient        Progress: declining  Outcome Evaluation: Pt alert to self only. VSS. Lactic - 3.4. 500mL bolus of NS hung. 1/2NS with bicarb at 100mL/hr continued. Cortrak remains occluded, in place. Both pulmonology and LHA aware. 1.5L NC overnight for sats in mid-90s. Q8h Heparin continued. No voids this shift. YESSY suspected. Bladder scan this AM showed 37mL. Incontinent BM x1 overnight.

## 2023-09-24 NOTE — PROGRESS NOTES
"  Intensive Care Unit Daily Progress Note.   [address]  9/24/2023    Patient Name:  Brandon Yo  MRN:  [id number]  YOB: 1935  Age: 87 y.o.  Sex: male         Reason for Admission / Chief Complaint:  Respiratory distress    Hospital Course:   87-year-old male with history of chronic obstructive pulmonary disease, dementia, recurrent aspiration with interstitial changes who presented with respiratory distress initially placed on noninvasive ventilation with improvement in his respiratory status.  Transferred to the ICU for further management.  Patient transferred out of the ICU 9/22.    Interval History:  This morning patient's clinical status is markedly worsening.  He is significantly less responsive today on evaluation than yesterday.  Appears more confused.  He is also tachypneic with mild work of breathing.  ABG demonstrates respiratory alkalosis.  AST/ALT in 5-6000 range.    Physical Exam:  /56 (BP Location: Left arm, Patient Position: Lying)   Pulse 102   Temp 97.5 °F (36.4 °C)   Resp 22   Ht 172.7 cm (68\")   Wt 64.3 kg (141 lb 12.1 oz)   SpO2 97%   BMI 21.55 kg/m²   Body mass index is 21.55 kg/m².    Intake/Output    Intake/Output Summary (Last 24 hours) at 9/24/2023 0928  Last data filed at 9/24/2023 0909  Gross per 24 hour   Intake 390 ml   Output 0 ml   Net 390 ml     General: Somnolent  HEENT: NC/AT, EOMI, MMM  Neck: Supple, trachea midline  Cardiac: RRR, no murmur, gallops, rubs  Pulmonary: Diminished breath sounds bilaterally, tachypneic, some work of breathing  GI: Soft, non-tender, non-distended, normal bowel sounds  Extremities: Warm, well perfused, no LE edema  Skin: no visible rash  Neuro: CN II - XII grossly intact, unable to assess for asterixis  Psychiatry: Somnolent    Data Review:  Notable Labs:  Results from last 7 days   Lab Units 09/24/23  0443 09/23/23  0458 09/22/23  0918 09/21/23  2216   WBC 10*3/mm3 23.44* 19.13* 22.74* 9.37 "   HEMOGLOBIN g/dL 10.6* 11.3* 12.6* 13.7   PLATELETS 10*3/mm3 273 217 256 301     Results from last 7 days   Lab Units 09/24/23  0856 09/24/23 0744 09/24/23 0443 09/23/23 1840 09/23/23  1601 09/23/23 0458 09/22/23 0918 09/21/23 2216   SODIUM mmol/L  --  144  --  141  --  141 142 139   POTASSIUM mmol/L  --  4.4  --  4.7  --  5.8* 4.6 4.6   CHLORIDE mmol/L  --  100  --  99  --  100 102 104   CO2 mmol/L  --  19.0*  --  19.0*  --  18.6* 23.8 24.1   BUN mg/dL  --  96*  --  82*  --  63* 33* 29*   CREATININE mg/dL 5.47 5.52*  --  4.42* 3.96 3.03* 1.74* 1.15   GLUCOSE mg/dL  --  82  --  101*  --  122* 184* 113*   CALCIUM mg/dL  --  7.9*  --  8.4*  --  9.3 9.8 9.3   MAGNESIUM mg/dL  --   --  1.8  --   --   --   --   --    PHOSPHORUS mg/dL  --   --  5.5*  --   --   --   --   --    Estimated Creatinine Clearance: 8.8 mL/min (by C-G formula based on SCr of 5.47 mg/dL).    Results from last 7 days   Lab Units 09/24/23 0856 09/24/23 0744 09/24/23 0443 09/23/23 1840 09/23/23  1601 09/23/23 0458 09/22/23 0918 09/21/23  2307 09/21/23  2216   AST (SGOT) U/L  --  6,716*  --   --   --   --   --   --  14   ALT (SGPT) U/L  --  5,829*  --   --   --   --   --   --  13   PROCALCITONIN ng/mL  --   --   --   --   --   --  1.01*  --   --    LACTATE mmol/L 1.8  --   --  3.4* 2.7*  --   --    < >  --    PLATELETS 10*3/mm3  --   --  273  --   --  217 256  --  301    < > = values in this interval not displayed.       Results from last 7 days   Lab Units 09/24/23  0856 09/23/23  1601 09/22/23  0527   PH, ARTERIAL pH units 7.510* 7.515* 7.435   PCO2, ARTERIAL mm Hg 29.6* 30.2* 35.1   PO2 ART mm Hg 78.8* 51.8* 76.5*   HCO3 ART mmol/L 23.6 24.4 23.6     Imaging:  Reviewed chest images personally from past 3 days    ASSESSMENT  /  PLAN:    Marked transaminitis, suspected acute liver failure  Altered mental status concerning for hepatic encephalopathy  Hyperbilirubinemia  Acute hypoxic respiratory failure  Aspiration pneumonia  Acute renal  failure  Respiratory alkalosis  Leukocytosis  Heart failure with preserved ejection fraction  Interstitial lung disease-possibly from chronic aspiration  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  Yesterday he was found to have worsening renal failure with ultrasound of the kidneys performed which was unremarkable.  Was started on bicarbonate for his metabolic acidosis.  This morning with worsening mental status and CMP demonstrating significant transaminitis in the 8537-2572 range.  Unclear etiology with significant work-up sent.  While patient's blood pressure has dropped he has never been hypotensive below 100 systolic, ischemic hepatitis seems less likely.    -Stat ultrasound liver, will assess flow for possible thrombosis  -Acute hepatitis panel, INR, Autoimmune hepatitis antibodies, urine drug screen, ammonia, iron profile, acetaminophen level  -Repeat blood cultures  - stat GI consult  -Continue antibiotics with Zosyn  -Pharmacy consulted to help identify any possible DILI candidates  -Gabapentin, Namenda, donepezil held at the moment.  -Continue with DuoNebs every 4 hours  -Swallow evaluation recommended n.p.o.  -High-resolution CT with bilateral lower lobe consolidations suspicious for pneumonia/aspiration.  Also with esophageal dilatation with ingested material present.  -Repeat echocardiogram with preserved LVEF, mild diastolic dysfunction.  -Due to the chronic changes which may be attributed to repeated episodes of aspiration, will have to figure out a long-term plan for patient's nutrition to prevent further episodes of aspiration pneumonia and chronic lung damage.  Possibly may need PEG tube placement during this hospitalization.    GI prophylaxis: Pantoprazole  DVT prophylaxis: Heparin  subcu  Alves catheter: Yes, to monitor I's and O's in critical state  Bowel regimen: Ordered  Diet: N.p.o.    Discharge: Transfer to ICU due to critical state    Critical care time: 45 minutes    Markus Lynn MD  [facility]  Pulmonary and Critical Care Medicine, Interventional Pulmonology    Parts of this note may be an electronic transcription/translation of spoken language to printed text using the Dragon dictation system.     Addendum  =========================  Called patient's wife and daughter to update them on patient's critical condition.  No answer, voicemail left for both.  Instructed to call the hospital for further information.  Nurses also attempted to call patient's wife without answer.    No ICU beds available, continues to wait for ICU bed to open up.

## 2023-09-25 NOTE — CONSULTS
Patient transferred to the palliative care unit following goals of care and treatment preferences discussion with Dr. Dallas. Patient and/or family state goal of care to be comfort and treatment preferences to be geared towards symptom management. The palliative care team will continue to follow for any further needs.

## 2023-09-25 NOTE — CASE MANAGEMENT/SOCIAL WORK
Discharge Planning Assessment  [facility]     Patient Name: Brandon Yo  MRN: [id number]  Today's Date: 9/25/2023    Admit Date: 9/21/2023    Plan: Refer to Palliative care.   Discharge Needs Assessment       Row Name 09/25/23 1128       Living Environment    People in Home spouse    Name(s) of People in Home Bethany Yo wife [phone]    Current Living Arrangements home    Potentially Unsafe Housing Conditions none    Quality of Family Relationships supportive    Able to Return to Prior Arrangements no       Resource/Environmental Concerns    Resource/Environmental Concerns none    Transportation Concerns none       Transition Planning    Patient/Family Anticipates Transition to inpatient hospice    Patient/Family Anticipated Services at Transition hospice care       Discharge Needs Assessment    Readmission Within the Last 30 Days no previous admission in last 30 days    Provided Post Acute Provider List? N/A    N/A Provider List Comment Plan is to go to palliative care    Provided Post Acute Provider Quality & Resource List? N/A    Current Discharge Risk chronically ill                   Discharge Plan       Row Name 09/25/23 4004       Plan    Plan Refer to Palliative care.    Plan Comments IMM 9/22/2023. Met with patient and family at bedside. Patient is not able to respond to questions. Bethany Yo wife [phone] is patient’s health care surrogate. Discharge plans are to proceed with palliative care consult. Will continue to monitor for new or changing discharge needs. Shila TONEY RN CCP                  Continued Care and Services - Admitted Since 9/21/2023    Coordination has not been started for this encounter.       Selected Continued Care - Prior Encounters Includes continued care and service providers with selected services from prior encounters from 6/23/2023 to 9/25/2023      Discharged on 7/15/2023 Admission date: 7/9/2023 - Discharge disposition: Home-Health Care [facility]      Home Medical Care        Service Provider Selected Services Address Phone Fax Patient Preferred    [facility] [id number] [facility] [address] [place] [address] [phone] --                          Expected Discharge Date and Time       Expected Discharge Date Expected Discharge Time    Sep 28, 2023            Demographic Summary       Row Name 09/25/23 1125       General Information    Admission Type inpatient    Arrived From emergency department    Required Notices Provided Important Message from Medicare    Referral Source admission list    Reason for Consult discharge planning    Preferred Language English       Contact Information    Permission Granted to Share Info With family/designee  Bethany Yo wife [phone]                   Functional Status       Row Name 09/25/23 1128       Functional Status    Usual Activity Tolerance moderate    Current Activity Tolerance poor       Mental Status Summary    Recent Changes in Mental Status/Cognitive Functioning unable to assess       Employment/    Employment Status retired                   Psychosocial    No documentation.                  Abuse/Neglect    No documentation.                  Legal    No documentation.                  Substance Abuse    No documentation.                  Patient Forms    No documentation.                     Shila Covarrubias RN

## 2023-09-25 NOTE — PROGRESS NOTES
PROGRESS NOTE      Patient Name: Brandon Yo  : 1935  MRN: [id number]  Primary Care Physician: Mike Covarrubias MD  Date of admission: 2023    Patient Care Team:  Mike Covarrubias MD as PCP - General (Family Medicine)  Bonita Sanchez MD as Consulting Physician (Orthopedic Surgery)        Subjective   Subjective:   Patient is quite lethargic but arousable.  Urine output only 45 mill.  +4.4 L fluid balance  Review of systems:  All other review of system unremarkable      Allergies:  No Known Allergies    Objective   Exam:     Vital Signs  Temp:  [97.8 °F (36.6 °C)-99.4 °F (37.4 °C)] 98 °F (36.7 °C)  Heart Rate:  [] 96  Resp:  [15-22] 16  BP: ()/(48-84) 128/60  SpO2:  [91 %-97 %] 91 %  on  Flow (L/min):  [1-2] 1;   Device (Oxygen Therapy): nasal cannula  Body mass index is 22.66 kg/m².    GEN: Pleasant elderly gentleman in no acute distress  EYES: PERRLA  HEENT: no abnormality  MOUTH: moist Mucous membranes  NECK: no visible JVD  RESP: clear to auscultation bilaterally, no crackles/wheezing  CVS: RRR, S1, S2+, no murmurs/rubs/gallops  ABD: soft, non-tender, non-distended  NEURO: Lethargic, occasional myoclonic jerking of upper extremities  EXT: no edemas  : Alves minimal dark urine      Results Review:  I have personally reviewed most recent Data :  CBC    Results from last 7 days   Lab Units 23  0336 23  0443 23  0458 23  0918 23  2216   WBC 10*3/mm3 23.73* 23.44* 19.13* 22.74* 9.37   HEMOGLOBIN g/dL 9.4* 10.6* 11.3* 12.6* 13.7   PLATELETS 10*3/mm3 307 273 217 256 301       CMP   Results from last 7 days   Lab Units 23  0336 23  1441 23  0953 23  0856 23  0744 23  1840 23  1601 23  0458 23  0918 23  2216   SODIUM mmol/L 145 144  --   --  144 141  --  141 142 139   POTASSIUM mmol/L 3.5 4.0  --   --  4.4 4.7  --  5.8* 4.6 4.6   CHLORIDE mmol/L 98 99  --   --  100 99  --  100 102 104   CO2  mmol/L 22.6 20.1*  --   --  19.0* 19.0*  --  18.6* 23.8 24.1   BUN mg/dL 101* 93*  --   --  96* 82*  --  63* 33* 29*   CREATININE mg/dL 6.82* 6.01*  --  5.47 5.52* 4.42* 3.96 3.03* 1.74* 1.15   GLUCOSE mg/dL 153* 106*  --   --  82 101*  --  122* 184* 113*   ALBUMIN g/dL 2.7* 2.8*  --   --  2.7*  --   --   --   --  4.1   BILIRUBIN mg/dL 7.7* 7.3*  --   --  6.6*  6.4*  --   --   --   --  0.3   ALK PHOS U/L 115 107  --   --  107  --   --   --   --  71   AST (SGOT) U/L 2,412* 4,873*  --   --  6,716*  --   --   --   --  14   ALT (SGPT) U/L 3,632* 4,818*  --   --  5,829*  --   --   --   --  13   AMMONIA umol/L  --   --  43  --   --   --   --   --   --   --        ABG    Results from last 7 days   Lab Units 09/24/23  0856 09/23/23  1601 09/22/23  0527   PH, ARTERIAL pH units 7.510* 7.515* 7.435   PCO2, ARTERIAL mm Hg 29.6* 30.2* 35.1   PO2 ART mm Hg 78.8* 51.8* 76.5*   O2 SATURATION ART % 96.9 90.1* 95.7   BASE EXCESS ART mmol/L 0.9 1.8 -0.1*       US Liver    Result Date: 9/24/2023   No discrete liver lesion or biliary ductal dilatation identified. Cholelithiasis. No evidence for acute cholecystitis. If there is further clinical concern, enhanced liver imaging can be obtained for further evaluation.  This report was finalized on 9/24/2023 10:49 AM by Dr. Jaylen Strickland M.D.      XR Chest 1 View    Result Date: 9/24/2023  Mild prominence of vascular interstitial markings is seen. Increased patchy opacities at the mid to lower lungs may represent edema and/or pneumonia, clinical correlation and follow-up recommended. Tortuous aorta.  This report was finalized on 9/24/2023 10:15 AM by Dr. Jaylen Strickland M.D.      US Renal Bilateral    Result Date: 9/23/2023  1. Small nonobstructing right renal stone. 2. Small right renal cyst. 3. No other significant findings are noted.   This report was finalized on 9/23/2023 2:05 PM by Dr. Ruben Frost M.D.      CT Abdomen Pelvis Stone Protocol    Result Date: 9/24/2023  1.  Patchy areas of increased density in the bilateral lower lobes right greater than left may represent some mild pneumonia superimposed on some chronic parenchymal changes. 2. Mild bilateral perinephric stranding and tiny nonobstructing right renal stone. There may be a punctate nonobstructing left renal stone somewhat obscured by motion. 3. Moderately large amount of stool in the rectosigmoid colon.  Radiation dose reduction techniques were utilized, including automated exposure control and exposure modulation based on body size.        Results for orders placed during the hospital encounter of 09/21/23    Adult Transthoracic Echo Limited W/ Cont if Necessary Per Protocol    Interpretation Summary    The study is technically suboptimal for diagnosis.    Left ventricle not well visualized. Left ventricular diastolic function is consistent with (grade I) impaired relaxation. Probably normal LV systolic function.    Scheduled Meds:aspirin, 81 mg, Oral, Daily  folic acid (FOLVITE) IVPB, 1 mg, Intravenous, Daily  heparin (porcine), 5,000 Units, Subcutaneous, Q8H  ipratropium-albuterol, 3 mL, Nebulization, Q4H - RT  pantoprazole, 40 mg, Intravenous, Q AM  piperacillin-tazobactam, 3.375 g, Intravenous, Q12H  primidone, 50 mg, Oral, Nightly  senna-docusate sodium, 2 tablet, Oral, BID  sodium chloride, 10 mL, Intravenous, Q12H  thiamine (B-1) IV, 200 mg, Intravenous, Daily      Continuous Infusions:sodium chloride, 75 mL/hr      PRN Meds:  senna-docusate sodium **AND** polyethylene glycol **AND** bisacodyl **AND** bisacodyl    Calcium Replacement - Follow Nurse / BPA Driven Protocol    Magnesium Standard Dose Replacement - Follow Nurse / BPA Driven Protocol    nitroglycerin    Phosphorus Replacement - Follow Nurse / BPA Driven Protocol    Potassium Replacement - Follow Nurse / BPA Driven Protocol    sodium chloride    sodium chloride    sodium chloride    Assessment & Plan   Assessment and Plan:         Aspiration pneumonia  due to vomitus    Acute respiratory failure with hypoxia    Esophageal dysphagia    Respiratory distress    Dementia without behavioral disturbance    YESSY (acute kidney injury)    Hyperkalemia    Metabolic acidosis    Hyperglycemia    ASSESSMENT:  Acute kidney injury etiology uncertain but it may be secondary to hemodynamics with significant fluctuation in the blood pressure  History of hypertension  Congestive heart failure with grade 1 diastolic dysfunction  Hyperkalemia secondary to YESSY  Metabolic acidosis with increased anion gap  Shock liver    PLAN :      Clinical picture consistent with dense ATN in the setting of possible severe inflammatory syndrome, either due to sepsis or due to other cause.  Oligoanuric and significantly positive fluid balance with possible low-grade uremic symptoms.  Patient is in need of renal replacement therapy if in line with goals of care.  We will discuss with the family.  Other interesting aspects are severely decreased haptoglobin and severely elevated LDH, but may not necessarily indicate intravascular hemolysis, may all be explained by acute liver failure. I will check retic count, no mention of schistocytes on peripheral smear but may benefit from formal hematology consultation. No readily apparent reason for hemolysis, absolute lack of thrombocytopenia argues against MAHA/TTP/HUS picture.   Same applies for hypocomplementemia which may also be attributable to liver failure as well.  I suspect some severe systemic inflammation is responsible for clinical picture, may need to consider occult malignancy also in differential, can cause elevated procalcitonin etc: defer to primary team.  Limited benefit to renal biopsy at this time as likely to show non specific dense ATN consistent with clinical picture.  I will check ESR, CRP and ferritin level.  Continue Abx at discretion of primary team.  No s/o overt fluid overload but significant positive fluid balance, so will keep IVFs,  but change to saline at 75 mL/h and stop in 12 hours. Pt with resp alkalemia ?liver failure.    Acute liver failure with coagulopathy and hypoalbuminemia and abnormal LFTs: follow-up with GI.  Guarded overall prognosis.    Electronically signed by Gretel Alcala MD,   [facility] kidney consultant  [phone]  9/25/2023  09:17 EDT

## 2023-09-25 NOTE — PROGRESS NOTES
LOS: 3 days   Patient Care Team:  Mike Covarrubias MD as PCP - General (Family Medicine)  Bonita Sanchez MD as Consulting Physician (Orthopedic Surgery)    Subjective     Patient transferred back to the ICU yesterday picking up coverage from Dr. Lynn and I did see the patient briefly yesterday when he went into an SVT.  Patient has COPD dementia recurrent aspiration with some chronic interstitial changes in his lungs acutely decompensated yesterday with markedly elevated liver function tests, decreased responsiveness and acute kidney injury  Patient is confused this morning but he is awake he is following simple commands he denied any pain or shortness of breath  Review of Systems:          Objective     Vital Signs  Vital Sign Min/Max for last 24 hours  Temp  Min: 97.5 °F (36.4 °C)  Max: 99.4 °F (37.4 °C)   BP  Min: 96/53  Max: 141/67   Pulse  Min: 91  Max: 172   Resp  Min: 15  Max: 22   SpO2  Min: 91 %  Max: 98 %   Flow (L/min)  Min: 1  Max: 2   Weight  Min: 67.2 kg (148 lb 2.4 oz)  Max: 67.6 kg (149 lb 0.5 oz)        Ventilator/Non-Invasive Ventilation Settings (From admission, onward)      None                         Body mass index is 22.66 kg/m².  I/O last 3 completed shifts:  In: 3229 [I.V.:2381; Other:570; NG/GT:128; IV Piggyback:150]  Out: 30 [Urine:30]  I/O this shift:  In: 1640 [I.V.:1212; Other:135; NG/GT:243; IV Piggyback:50]  Out: 15 [Urine:15]        Physical Exam:  General Appearance: Elderly jaundiced white male lying in bed in no apparent distress  Eyes: Pulls are about 2-1/2 mm they are equal and reactive to light he has mild Dimas icteric sclera  ENT: Mucous membranes are dry, he has a nasoenteric type tube  Neck: Trachea midline no definite jugular venous distention no palpable adenopathy  Lungs: He has a few crackles when he can getting to take a deep breath in the bases bilaterally  Cardiac: Regular rate and rhythm heart rates in the 90s looks to be sinus on the monitor no  murmur  Abdomen: Soft he does not appear to be tender I do not palpate hepatosplenomegaly or masses  : Catheter to drainage fairly concentrated appearing urine  Musculoskeletal: Mild thoracic kyphosis  Skin: Jaundiced  Neuro: Patient is following commands he squeeze both my hands to command he wiggled his toes to command he did these fairly easily without too much encouragement he will answer some questions but he is not oriented to person place or time could even getting to tell me his name.  Extremities/P Vascular: No clubbing no cyanosis no edema he has have palpable radial and dorsalis pedis pulses  MSE: Hard to assess       Labs:  Results from last 7 days   Lab Units 09/25/23  0336 09/24/23  1441 09/24/23  0856 09/24/23  0744 09/24/23  0443 09/23/23  1840 09/23/23  1601 09/23/23  0458 09/22/23  0918 09/21/23  2216   GLUCOSE mg/dL 153* 106*  --  82  --  101*  --  122* 184* 113*   SODIUM mmol/L 145 144  --  144  --  141  --  141 142 139   POTASSIUM mmol/L 3.5 4.0  --  4.4  --  4.7  --  5.8* 4.6 4.6   MAGNESIUM mg/dL  --   --   --   --  1.8  --   --   --   --   --    CO2 mmol/L 22.6 20.1*  --  19.0*  --  19.0*  --  18.6* 23.8 24.1   CHLORIDE mmol/L 98 99  --  100  --  99  --  100 102 104   ANION GAP mmol/L 24.4* 24.9*  --  25.0*  --  23.0*  --  22.4* 16.2* 10.9   CREATININE mg/dL 6.82* 6.01* 5.47 5.52*  --  4.42* 3.96 3.03* 1.74* 1.15   BUN mg/dL 101* 93*  --  96*  --  82*  --  63* 33* 29*   BUN / CREAT RATIO  14.8 15.5  --  17.4  --  18.6  --  20.8 19.0 25.2*   CALCIUM mg/dL 7.7* 7.7*  --  7.9*  --  8.4*  --  9.3 9.8 9.3   ALK PHOS U/L 115 107  --  107  --   --   --   --   --  71   TOTAL PROTEIN g/dL 5.5* 5.9*  --  5.8*  --   --   --   --   --  7.6   ALT (SGPT) U/L 3,632* 4,818*  --  5,829*  --   --   --   --   --  13   AST (SGOT) U/L 2,412* 4,873*  --  6,716*  --   --   --   --   --  14   BILIRUBIN mg/dL 7.7* 7.3*  --  6.6*  6.4*  --   --   --   --   --  0.3   ALBUMIN g/dL 2.7* 2.8*  --  2.7*  --   --   --    --   --  4.1   GLOBULIN gm/dL 2.8 3.1  --  3.1  --   --   --   --   --  3.5     Estimated Creatinine Clearance: 7.4 mL/min (A) (by C-G formula based on SCr of 6.82 mg/dL (H)).  Results from last 7 days   Lab Units 09/24/23  0443   IONIZED CALCIUM mg/dL 3.6*     Results from last 7 days   Lab Units 09/24/23  0443 09/23/23  0458 09/22/23  0918 09/21/23  2216   WBC 10*3/mm3 23.44* 19.13* 22.74* 9.37   RBC 10*6/mm3 2.81* 2.33* 2.54* 4.63   HEMOGLOBIN g/dL 10.6* 11.3* 12.6* 13.7   HEMATOCRIT % 32.0* 34.0* 38.0 44.4   MCV fL 113.8* 145.9* 149.6* 95.9   MCH pg 37.7* 48.4* 49.6* 29.6   MCHC g/dL 33.1 33.2 33.1 30.9*   RDW %  --   --   --  15.2   RDW-SD fl  --   --   --  54.0   MPV fL 11.0 11.0 11.2 10.4   PLATELETS 10*3/mm3 273 217 256 301   NEUTROPHIL % %  --   --   --  67.3   LYMPHOCYTE % %  --   --   --  25.0   MONOCYTES % %  --   --   --  4.6*   EOSINOPHIL % %  --   --   --  2.7   BASOPHIL % %  --   --   --  0.3   IMM GRAN % %  --   --   --  0.1   NEUTROS ABS 10*3/mm3 22.27* 17.79* 20.24* 6.31   LYMPHS ABS 10*3/mm3  --   --   --  2.34   MONOS ABS 10*3/mm3  --   --   --  0.43   EOS ABS 10*3/mm3 0.23  --   --  0.25   BASOS ABS 10*3/mm3  --   --   --  0.03   IMMATURE GRANS (ABS) 10*3/mm3  --   --   --  0.01   NRBC /100 WBC 3.0*  4.4*  --  1.0*  --      Results from last 7 days   Lab Units 09/24/23  0856   PH, ARTERIAL pH units 7.510*   PO2 ART mm Hg 78.8*   PCO2, ARTERIAL mm Hg 29.6*   HCO3 ART mmol/L 23.6     Results from last 7 days   Lab Units 09/23/23  1840 09/23/23  0458 09/22/23  1359 09/22/23  0918   CK TOTAL U/L 151  --   --   --    HSTROP T ng/L  --  78* 35* 31*     Results from last 7 days   Lab Units 09/24/23  0744 09/23/23  0458 09/21/23  2216   PROBNP pg/mL 4,668.0* 3,616.0* 496.7         Results from last 7 days   Lab Units 09/25/23  0336 09/24/23  0856 09/24/23  0848 09/23/23  1840 09/23/23  1601 09/22/23  0918 09/21/23  2307   LACTATE mmol/L  --  1.8 2.7* 3.4* 2.7*  --  1.5   PROCALCITONIN ng/mL 16.90*  --    --   --   --  1.01*  --      Results from last 7 days   Lab Units 09/24/23  1021   INR  2.04*     Microbiology Results (last 10 days)       Procedure Component Value - Date/Time    MRSA Screen, PCR (Inpatient) - Swab, Nares [[id number]]  (Normal) Collected: 09/23/23 0949    Lab Status: Final result Specimen: Swab from Nares Updated: 09/23/23 1118     MRSA PCR No MRSA Detected    Narrative:      The negative predictive value of this diagnostic test is high and should only be used to consider de-escalating anti-MRSA therapy. A positive result may indicate colonization with MRSA and must be correlated clinically.    Blood Culture - Blood, Arm, Right [[id number]]  (Normal) Collected: 09/21/23 2311    Lab Status: Preliminary result Specimen: Blood from Arm, Right Updated: 09/24/23 2330     Blood Culture No growth at 3 days    Narrative:      Less than seven (7) mL's of blood was collected.  Insufficient quantity may yield false negative results.    Blood Culture - Blood, Arm, Left [[id number]]  (Normal) Collected: 09/21/23 2308    Lab Status: Preliminary result Specimen: Blood from Arm, Left Updated: 09/24/23 2330     Blood Culture No growth at 3 days    Narrative:      Less than seven (7) mL's of blood was collected.  Insufficient quantity may yield false negative results.    COVID-19 and FLU A/B PCR - Swab, Nasopharynx [[id number]]  (Normal) Collected: 09/21/23 2210    Lab Status: Final result Specimen: Swab from Nasopharynx Updated: 09/21/23 2234     COVID19 Not Detected     Influenza A PCR Not Detected     Influenza B PCR Not Detected    Narrative:      Fact sheet for providers: https://www.fda.gov/media/909210/download    Fact sheet for patients: https://www.fda.gov/media/065556/download    Test performed by PCR.                aspirin, 81 mg, Oral, Daily  heparin (porcine), 5,000 Units, Subcutaneous, Q8H  ipratropium-albuterol, 3 mL, Nebulization, Q4H - RT  pantoprazole, 40 mg, Intravenous, Q  AM  piperacillin-tazobactam, 3.375 g, Intravenous, Q12H  primidone, 50 mg, Oral, Nightly  senna-docusate sodium, 2 tablet, Oral, BID  sodium chloride, 10 mL, Intravenous, Q12H  sodium zirconium cyclosilicate, 10 g, Oral, TID      custom IV infusion builder, , Last Rate: 100 mL/hr at 09/25/23 0436        Diagnostics:  XR Chest 2 View    Result Date: 9/21/2023  PA AND LATERAL CHEST RADIOGRAPH  HISTORY: Cough and congestion  COMPARISON: July 9, 2023  FINDINGS: Heart size is within normal limits. There is calcification of the aorta. There is tortuosity and ectasia of the thoracic aorta. Lung volumes are somewhat diminished. There is diffuse interstitial prominence. Appearance are most in keeping with chronic interstitial lung disease and pulmonary fibrosis. Appearance is similar to the exam from July 9, 2023. No definite acute superimposed infiltrate is seen.      Changes of chronic interstitial lung disease and pulmonary fibrosis.  This report was finalized on 9/21/2023 11:03 PM by Dr. Marisabel Soto M.D.      CT Chest Without Contrast Diagnostic    Result Date: 9/22/2023  CT CHEST WITHOUT CONTRAST  HISTORY: Pneumonia, respiratory distress, history of esophageal stricture  TECHNIQUE: Radiation dose reduction techniques were utilized, including automated exposure control and exposure modulation based on body size. Axial images were obtained through the chest without the administration of IV contrast. Coronal and sagittal reformatted images obtained.  COMPARISON: 10/17/2019  FINDINGS: There is a background of chronic emphysematous and fibrotic changes. There are consolidations in both lower lobes. These are associated with some bilateral lower lobe bronchiectasis. The consolidations are new since the comparison study. There is also some groundglass and consolidative infiltrate in the posterior right upper lobe along the fissure and also small amount of groundglass infiltrate in the posterior left upper lobe along the  fissure both of these are also new. The esophagus is distended and is filled with ingested material. A small hiatal hernia is noted. Mildly prominent mediastinal lymph nodes are likely reactive. Coronary artery calcification. No pleural effusion. Limited imaging of the upper abdomen demonstrates tiny bilateral kidney stones. No acute bony abnormality      Bilateral lower lobe consolidations and some posterior upper lobe infiltrates. These findings are most suspicious for bilateral pneumonia superimposed upon chronic emphysematous and fibrotic changes. Additionally, the esophagus is distended with ingested material raising the concern for aspiration pneumonia. Patient has a diagnosis of esophageal stricture and the appearance of the esophagus suggests worsening of the stricture.   Radiation dose reduction techniques were utilized, including automated exposure control and exposure modulation based on body size.   This report was finalized on 9/22/2023 4:20 PM by Dr. Serafin Garner M.D.      US Liver    Result Date: 9/24/2023  US LIVER-  INDICATIONS: Transaminitis  TECHNIQUE: Ultrasound of the right upper quadrant  COMPARISON: CT from 8/16/2023  FINDINGS:  The gallbladder is nontender, with contains sludge and stones. No gallbladder wall thickening or pericholecystic fluid.  No intrahepatic or extrahepatic biliary ductal dilatation is demonstrated. The common biliary duct caliber is measured at 4 mm.  No liver lesion is identified. Diffusely, the echogenicity of the liver is otherwise in the range of normal relative to that of the right kidney.  The pancreas is partly obscured. The right kidney, 9.7 cm, and the pancreas otherwise appear unremarkable.          No discrete liver lesion or biliary ductal dilatation identified. Cholelithiasis. No evidence for acute cholecystitis. If there is further clinical concern, enhanced liver imaging can be obtained for further evaluation.  This report was finalized on 9/24/2023  10:49 AM by Dr. Jaylen Strickland M.D.      XR Chest 1 View    Result Date: 9/24/2023  XR CHEST 1 VW-  HISTORY: Male who is 87 years-old, aspiration pneumonia  TECHNIQUE: Frontal view of the chest  COMPARISON: 9/21/2023  FINDINGS: NG tube extends beyond the diaphragm and off the image inferiorly. The patient is rotated towards the right. The heart size is normal. Aorta is tortuous, calcified. Mild prominence of vascular interstitial markings is seen. Increased patchy opacities at the mid to lower lungs may represent edema and/or pneumonia, clinical correlation and follow-up recommended. No pleural effusion or pneumothorax. No acute osseous process.      Mild prominence of vascular interstitial markings is seen. Increased patchy opacities at the mid to lower lungs may represent edema and/or pneumonia, clinical correlation and follow-up recommended. Tortuous aorta.  This report was finalized on 9/24/2023 10:15 AM by Dr. Jaylen Strickland M.D.      Adult Transthoracic Echo Limited W/ Cont if Necessary Per Protocol    Result Date: 9/22/2023    The study is technically suboptimal for diagnosis.   Left ventricle not well visualized. Left ventricular diastolic function is consistent with (grade I) impaired relaxation. Probably normal LV systolic function.     US Renal Bilateral    Result Date: 9/23/2023  US RENAL BILATERAL-9/23/2023  HISTORY: Renal insufficiency.  The right kidney measures 9.6 cm in length. The left kidney measures 9.8 cm in length. There is an approximately 4 mm echogenic focus with some shadowing in the lower pole right kidney consistent with a tiny stone. There is an approximately 1.0 cm benign-appearing right renal cyst.  No hydronephrosis, solid renal masses or other stones are seen. Urinary bladder is unremarkable.      1. Small nonobstructing right renal stone. 2. Small right renal cyst. 3. No other significant findings are noted.   This report was finalized on 9/23/2023 2:05 PM by Dr. Miranda  ABIOLA Frost      CT Abdomen Pelvis Stone Protocol    Result Date: 9/24/2023  CT of the abdomen and pelvis without contrast 9/24/2023  HISTORY: Possible renal ischemia or infarction.  Spiral images were obtained from the lung bases to the symphysis pubis. No intravenous contrast was given. There is some high attenuation material within some of the bowel which may represent ingested oral contrast.  There are mild to moderate patchy areas of increased density in the bilateral lower lobes which appears slightly more severe than on the previous study of 8/16/2023 and there may be some mild acute pneumonia superimposed on some chronic changes.  Gallbladder is mildly dilated but no gallstones or inflammatory changes are seen. NG tube courses into the stomach terminating near the distal stomach or proximal duodenum.  The liver, spleen and adrenals appear unremarkable. Pancreas is atrophic.  Many of the images are degraded by patient motion. There is left perinephric stranding. No left hydronephrosis is seen. There may be a very tiny nonobstructing punctate left renal stone. Tiny nonobstructing right renal stone is seen. There is very minimal right perinephric stranding. No hydronephrosis on the right is seen.  In the absence of IV contrast renal infarction or ischemia is difficult to assess.  There is some aortoiliac calcification.  There is moderately large amount of stool in the rectal sigmoid colon.  Urinary bladder is collapsed and there is a Alves catheter within the urinary bladder.      1. Patchy areas of increased density in the bilateral lower lobes right greater than left may represent some mild pneumonia superimposed on some chronic parenchymal changes. 2. Mild bilateral perinephric stranding and tiny nonobstructing right renal stone. There may be a punctate nonobstructing left renal stone somewhat obscured by motion. 3. Moderately large amount of stool in the rectosigmoid colon.  Radiation dose reduction  techniques were utilized, including automated exposure control and exposure modulation based on body size.       Results for orders placed during the hospital encounter of 09/21/23    Adult Transthoracic Echo Limited W/ Cont if Necessary Per Protocol    Interpretation Summary    The study is technically suboptimal for diagnosis.    Left ventricle not well visualized. Left ventricular diastolic function is consistent with (grade I) impaired relaxation. Probably normal LV systolic function.          Active Hospital Problems    Diagnosis  POA    **Aspiration pneumonia due to vomitus [J69.0]  Yes    Dementia without behavioral disturbance [F03.90]  Yes    YESSY (acute kidney injury) [N17.9]  Yes    Hyperkalemia [E87.5]  Yes    Metabolic acidosis [E87.20]  Yes    Hyperglycemia [R73.9]  Yes    Respiratory distress [R06.03]  Yes    Acute respiratory failure with hypoxia [J96.01]  Yes    Esophageal dysphagia [R13.19]  Yes      Resolved Hospital Problems   No resolved problems to display.         Assessment & Plan     Acute kidney injury possibly secondary to ATN creatinine continues to rise electrolytes look better not sure he has to have dialysis today but he is not far, nephrology following.  We will have to discuss with family to see if they want to make that jump.  Acute hepatitis infectious profile negative likely ischemic hepatitis as transaminases are improving rapidly.  Ultrasound the liver yesterday not too remarkable but does not look like they did Dopplers I have those ordered to rule out portal vein thrombosis but I think with the improving liver function test that is less likely.  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  Does have bibasilar infiltrates probably  aspiration pneumonia continue Zosyn  Pneumonia probable aspiration with known esophageal dysphagia and bibasilar infiltrates.  Acute respiratory failure actually improving he is weaned down to 1 L O2  Grade 1 diastolic dysfunction  Metabolic acidosis probably secondary to renal disease  PSVT converted with adenosine last night and has remained in sinus rhythm  COPD bronchodilators does not appear to be in acute exacerbation at this moment  Dementia  Dysphagia    Plan for disposition: Patient is a DNR he does not look good this constellation of problems at his age does not carry a real good prognosis.  We will have to have discussions with the family whether they want to move to dialysis if that becomes necessary      Addendum I discussed with nephrology they do feel he is close to probably needing renal replacement therapy they also have some concern about possible hemolysis with low haptoglobin and high LDH complements are low they request hematology consult which I have placed.  I called the patient's wife to discuss she said that they were on their way in and should be here within the next 30 minutes or so her daughter is going to come with her and she would prefer to discuss the situation in person    Cliff Dallas Jr, MD  09/25/23  06:52 EDT    Time: Critical care time 41 minutes thus far today

## 2023-09-25 NOTE — SIGNIFICANT NOTE
09/25/23 1134   OTHER   Discipline physical therapist   Therapy Assessment/Plan (PT)   Criteria for Skilled Interventions Met (PT) no;does not meet criteria for skilled intervention;other (see comments)  (Per spouse at bedside, plans are now for transitioning to palliative care. Notified RN via secure chat that therapies will sign off. Please reconsult as appropriate.)

## 2023-09-25 NOTE — PROGRESS NOTES
Pulmonary/critical care    I just spoke with the patient's daughter and wife and they are both unanimous that he would not want dialysis apparently his quality of life has been in a significant decline over the last 6 months.  We talked about palliative care and they are ready to transition to palliative care the attending RN was present for the discussion.

## 2023-09-25 NOTE — PROGRESS NOTES
[facility]  Inpatient Progress Note    Reason for Follow Up: Transaminitis    Subjective     Interval History:   AST and ALT showing very gradual decline, bilirubin still elevated.  Attic Doppler studies completed this a.m. results are pending.  Discussed with RN at bedside.  Patient is DNR/DNI, awaiting decision regarding whether he is a dialysis candidate in the face of his worsening renal function.    Current Facility-Administered Medications:     aspirin EC tablet 81 mg, 81 mg, Oral, Daily, Tristin Nance MD, 81 mg at 09/24/23 1043    sennosides-docusate (PERICOLACE) 8.6-50 MG per tablet 2 tablet, 2 tablet, Oral, BID **AND** polyethylene glycol (MIRALAX) packet 17 g, 17 g, Oral, Daily PRN **AND** bisacodyl (DULCOLAX) EC tablet 5 mg, 5 mg, Oral, Daily PRN **AND** bisacodyl (DULCOLAX) suppository 10 mg, 10 mg, Rectal, Daily PRN, Tristin Nance MD    Calcium Replacement - Follow Nurse / BPA Driven Protocol, , Does not apply, PRN, Tristin Nance MD    folic acid 1 mg in sodium chloride 0.9 % 50 mL IVPB, 1 mg, Intravenous, Daily, Cliff Dallas Jr., MD    heparin (porcine) 5000 UNIT/ML injection 5,000 Units, 5,000 Units, Subcutaneous, Q8H, Markus Lynn MD, 5,000 Units at 09/25/23 0502    ipratropium-albuterol (DUO-NEB) nebulizer solution 3 mL, 3 mL, Nebulization, Q4H - RT, Markus Lynn MD, 3 mL at 09/25/23 0301    Magnesium Standard Dose Replacement - Follow Nurse / BPA Driven Protocol, , Does not apply, PRN, Tristin Nance MD    nitroglycerin (NITROSTAT) SL tablet 0.4 mg, 0.4 mg, Sublingual, Q5 Min PRN, Tristin Nance MD    pantoprazole (PROTONIX) injection 40 mg, 40 mg, Intravenous, Q AM, Markus Lynn MD, 40 mg at 09/25/23 0501    Phosphorus Replacement - Follow Nurse / BPA Driven Protocol, , Does not apply, PRN, Tristin Nance MD    piperacillin-tazobactam (ZOSYN) 3.375 g in iso-osmotic dextrose 50 ml (premix), 3.375 g, Intravenous, Q12H, Tristin Nance MD,  3.375 g at 09/25/23 0002    Potassium Replacement - Follow Nurse / BPA Driven Protocol, , Does not apply, PRN, Tristin Nance MD    primidone (MYSOLINE) tablet 50 mg, 50 mg, Oral, Nightly, Tristin Nance MD, 50 mg at 09/24/23 2100    sodium chloride 0.45 % 925 mL with sodium bicarbonate 8.4 % 75 mEq infusion, , Intravenous, Continuous, Hugh Cortes MD, Last Rate: 100 mL/hr at 09/25/23 0436, New Bag at 09/25/23 0436    sodium chloride 0.9 % flush 10 mL, 10 mL, Intravenous, PRN, Tristin Nance MD    sodium chloride 0.9 % flush 10 mL, 10 mL, Intravenous, Q12H, Tristin Nance MD, 10 mL at 09/24/23 2100    sodium chloride 0.9 % flush 10 mL, 10 mL, Intravenous, PRN, Tristin Nance MD    sodium chloride 0.9 % infusion 40 mL, 40 mL, Intravenous, PRN, Tristin Nance MD    sodium zirconium cyclosilicate (LOKELMA) pack 10 g, 10 g, Oral, TID, Markus Lynn MD, 10 g at 09/24/23 1043    thiamine (B-1) injection 200 mg, 200 mg, Intravenous, Daily, Cliff Dallas Jr., MD  Review of Systems:    Review of systems could not be obtained due to  patient sedation status.    Objective     Vital Signs  Temp:  [97.8 °F (36.6 °C)-99.4 °F (37.4 °C)] 98 °F (36.7 °C)  Heart Rate:  [] 96  Resp:  [15-22] 16  BP: ()/(48-84) 128/60  Body mass index is 22.66 kg/m².    Intake/Output Summary (Last 24 hours) at 9/25/2023 0850  Last data filed at 9/25/2023 0615  Gross per 24 hour   Intake 4479 ml   Output 45 ml   Net 4434 ml     No intake/output data recorded.     Physical Exam:   General: patient awake, alert and cooperative   Eyes: Normal lids and lashes, no scleral icterus   Neck: supple, normal ROM   Skin: warm and dry, not jaundiced   Cardiovascular: regular rhythm and rate, no murmurs auscultated   Pulm: clear to auscultation bilaterally, regular and unlabored   Abdomen: soft, nontender, nondistended; normal bowel sounds   Extremities: no rash or edema   Psychiatric: Normal mood and behavior; memory  intact     Results Review:     I reviewed the patient's new clinical results.    Results from last 7 days   Lab Units 09/25/23  0336 09/24/23  0443 09/23/23  0458   WBC 10*3/mm3 23.73* 23.44* 19.13*   HEMOGLOBIN g/dL 9.4* 10.6* 11.3*   HEMATOCRIT % 23.0* 32.0* 34.0*   PLATELETS 10*3/mm3 307 273 217     Results from last 7 days   Lab Units 09/25/23  0336 09/24/23  1441 09/24/23  0856 09/24/23  0744   SODIUM mmol/L 145 144  --  144   POTASSIUM mmol/L 3.5 4.0  --  4.4   CHLORIDE mmol/L 98 99  --  100   CO2 mmol/L 22.6 20.1*  --  19.0*   BUN mg/dL 101* 93*  --  96*   CREATININE mg/dL 6.82* 6.01* 5.47 5.52*   CALCIUM mg/dL 7.7* 7.7*  --  7.9*   BILIRUBIN mg/dL 7.7* 7.3*  --  6.6*  6.4*   ALK PHOS U/L 115 107  --  107   ALT (SGPT) U/L 3,632* 4,818*  --  5,829*   AST (SGOT) U/L 2,412* 4,873*  --  6,716*   GLUCOSE mg/dL 153* 106*  --  82     Results from last 7 days   Lab Units 09/24/23  1021   INR  2.04*     No results found for: LIPASE    Radiology:  CT Abdomen Pelvis Stone Protocol   Preliminary Result   1. Patchy areas of increased density in the bilateral lower lobes right   greater than left may represent some mild pneumonia superimposed on some   chronic parenchymal changes.   2. Mild bilateral perinephric stranding and tiny nonobstructing right   renal stone. There may be a punctate nonobstructing left renal stone   somewhat obscured by motion.   3. Moderately large amount of stool in the rectosigmoid colon.       Radiation dose reduction techniques were utilized, including automated   exposure control and exposure modulation based on body size.              US Liver   Final Result       No discrete liver lesion or biliary ductal dilatation identified.   Cholelithiasis. No evidence for acute cholecystitis. If there is further   clinical concern, enhanced liver imaging can be obtained for further   evaluation.       This report was finalized on 9/24/2023 10:49 AM by Dr. Jaylen Strickland M.D.          XR Chest 1  View   Final Result   Mild prominence of vascular interstitial markings is seen.   Increased patchy opacities at the mid to lower lungs may represent edema   and/or pneumonia, clinical correlation and follow-up recommended.   Tortuous aorta.       This report was finalized on 9/24/2023 10:15 AM by Dr. Jaylen Strickland M.D.          US Renal Bilateral   Final Result   1. Small nonobstructing right renal stone.   2. Small right renal cyst.   3. No other significant findings are noted.           This report was finalized on 9/23/2023 2:05 PM by Dr. Ruben Frost M.D.          CT Chest Without Contrast Diagnostic   Final Result   Bilateral lower lobe consolidations and some posterior upper lobe   infiltrates. These findings are most suspicious for bilateral pneumonia   superimposed upon chronic emphysematous and fibrotic changes.   Additionally, the esophagus is distended with ingested material raising   the concern for aspiration pneumonia. Patient has a diagnosis of   esophageal stricture and the appearance of the esophagus suggests   worsening of the stricture.           Radiation dose reduction techniques were utilized, including automated   exposure control and exposure modulation based on body size.           This report was finalized on 9/22/2023 4:20 PM by Dr. Serafin Garner M.D.          XR Chest 2 View   Final Result   Changes of chronic interstitial lung disease and pulmonary fibrosis.       This report was finalized on 9/21/2023 11:03 PM by Dr. Marisabel Soto M.D.              Assessment & Plan     Active Hospital Problems    Diagnosis     **Aspiration pneumonia due to vomitus     Dementia without behavioral disturbance     YESSY (acute kidney injury)     Hyperkalemia     Metabolic acidosis     Hyperglycemia     Respiratory distress     Acute respiratory failure with hypoxia     Esophageal dysphagia        Assessment:  Transaminitis: Showing gradual improvement, still not clear as to the  etiology although may be more related to the sepsis picture.  Hyperbilirubinemia  Dysphagia, on tube feeds  Acute renal failure  Respiratory distress  History of previous esophageal stricture status post dilation      Plan:  Continue to monitor LFTs  Await hepatic duplex results  Supportive measures  Tube feeds as tolerated  I discussed the patients findings and my recommendations with patient and nursing staff.    Eric Abdalla MD

## 2023-09-25 NOTE — PLAN OF CARE
Goal Outcome Evaluation:   Patient pleasantly confused and drowsy overnight. AOx2-3 and follows all commands with equal strength. Arouses to voice and will respond to questions. Denied pain when asked and scored 0 consistently via PAINAD scale. Dr. Cortes (nephrology) informed of CT Abd/Pelvis results as well as remarkable labs (K 3.5; Cr 8.62) and poor urine output (15mL). Tube feeding held at 0345 in anticipation of hepatic portal duplex. Loose bowel movements overnight; Bowel regimen held. Lokelma also held per MAKAYLA Spaulding d/t downtrending K level (4.0). Will continue to monitor. Care ongoing.

## 2023-09-26 NOTE — DISCHARGE SUMMARY
Discharge As      Date of Admisssion:  2023  Date of Death:    Time of Death:      Patient Care Team:  Mike Covarrubias MD as PCP - General (Family Medicine)  Bonita Sanchez MD as Consulting Physician (Orthopedic Surgery)    Final Diagnosis:   Acute on chronic renal failure  Acute hepatitis  Pneumonia  Acute respiratory failure with hypoxia  Dementia        Presenting Problem/History of Present Illness  Respiratory distress [R06.03]      Hospital Course  Patient was a 87 y.o. male presented with Respiratory distress.  He was initially seen at an outside emergency room with acute respiratory distress.  He was placed on noninvasive ventilator with 100% FiO2 and was stabilized there given bronchodilators and Lasix.  He was transferred from the outside emergency room to our ICU.  Following interventions he was titrated down to nasal cannula and was improving.  It was felt on admission that he was dealing with acute hypoxic current respiratory failure secondary to acute on chronic diastolic heart failure and aspiration pneumonia.  He was not felt to have a COPD exacerbation at that time.  He stabilized to the point that he was transferred out of the ICU but unfortunately the following day he decompensated.  His creatinine significantly increased and liver function test significantly increased as well.  His oxygen requirements began to increase also.  Because of this he was transferred back to the ICU with acute on chronic renal failure and acute hepatitis.  Work-up was unrevealing for the source of his hepatitis it was felt it was either a drug-induced liver injury versus ischemic liver injury.  His renal function did not improve with hydration and blood pressure control.  Nephrology and GI both assisted.  Ultimately was felt like he was nearing the need for dialysis and family at that point elected to pursue palliative care and comfort measures.  Nonessential medications were discontinued he was  started on medications for comfort and passed away on the September 26 at 8:43 AM          Rogelio Momin MD  09/26/23  09:02 EDT

## 2023-09-26 NOTE — PROGRESS NOTES
Case Management Discharge Note      Final Note: The patient  on 23 @ 08:43. BNehal Delgado RN CCP.    Provided Post Acute Provider List?: N/A  N/A Provider List Comment: Plan is to go to palliative care  Provided Post Acute Provider Quality & Resource List?: N/A    Selected Continued Care - Admitted Since 2023       Destination    No services have been selected for the patient.                Durable Medical Equipment    No services have been selected for the patient.                Dialysis/Infusion    No services have been selected for the patient.                Home Medical Care    No services have been selected for the patient.                Therapy    No services have been selected for the patient.                Community Resources    No services have been selected for the patient.                Community & DME    No services have been selected for the patient.                    Selected Continued Care - Prior Encounters Includes continued care and service providers with selected services from prior encounters from 2023 to 2023      Discharged on 7/15/2023 Admission date: 2023 - Discharge disposition: Home-Health Care [facility]      Home Medical Care       Service Provider Selected Services Address Phone Fax Patient Preferred    [facility] [id number] [address]  [place] [id number] [phone] --                               Final Discharge Disposition Code: 20 -

## 2023-09-26 NOTE — PROGRESS NOTES
Discharge Planning Assessment  [facility]     Patient Name: Brandon Yo  MRN: [id number]  Today's Date: 2023    Admit Date: 2023    Plan: Refer to Palliative care.   Discharge Needs Assessment    No documentation.                  Discharge Plan       Row Name 23 0911       Plan    Plan Comments The patient transferred to [facility] from ICU on 23. The patient was palliative. JUNG Delgado RN, CCP    Final Discharge Disposition Code 20 -     Final Note The patient  on 23 @ 08:43. JUNG Delgado RN CCP.                  Continued Care and Services - Admitted Since 2023    Coordination has not been started for this encounter.       Selected Continued Care - Prior Encounters Includes continued care and service providers with selected services from prior encounters from 2023 to 2023      Discharged on 7/15/2023 Admission date: 2023 - Discharge disposition: Home-Health Care [facility]      Home Medical Care       Service Provider Selected Services Address Phone Fax Patient Preferred    [facility] [address] [place] [id number] [phone] --                          Expected Discharge Date and Time       Expected Discharge Date Expected Discharge Time    Sep 26, 2023  8:43 AM           Demographic Summary    No documentation.                  Functional Status    No documentation.                  Psychosocial    No documentation.                  Abuse/Neglect    No documentation.                  Legal    No documentation.                  Substance Abuse    No documentation.                  Patient Forms    No documentation.                     Ashley Delgado RN

## 2023-09-26 NOTE — PLAN OF CARE
Goal Outcome Evaluation:           Progress: declining  Outcome Evaluation: Patient unresponsive. Nonverbal. Muscle tremors noted bilat upper extremeties. Premedicated prior to repositioning with 1mg dilaudid and 2mg ativan with good effect. Periods of apnea noted. Bilat lower extremeties mottled. Will continue to monitor.

## 2023-09-27 LAB
ANA SER QL IF: POSITIVE
ANA SPECKLED TITR SER: ABNORMAL {TITER}
C-ANCA TITR SER IF: ABNORMAL TITER
CENTROMERE B AB SER-ACNC: <0.2 AI (ref 0–0.9)
CHROMATIN AB SERPL-ACNC: <0.2 AI (ref 0–0.9)
DSDNA AB SER-ACNC: 2 IU/ML (ref 0–9)
ENA JO1 AB SER-ACNC: <0.2 AI (ref 0–0.9)
ENA RNP AB SER-ACNC: 0.3 AI (ref 0–0.9)
ENA SCL70 AB SER-ACNC: <0.2 AI (ref 0–0.9)
ENA SM AB SER-ACNC: <0.2 AI (ref 0–0.9)
ENA SS-A AB SER-ACNC: 2.7 AI (ref 0–0.9)
ENA SS-B AB SER-ACNC: 1.3 AI (ref 0–0.9)
LABORATORY COMMENT REPORT: ABNORMAL
Lab: ABNORMAL
Lab: ABNORMAL
MYELOPEROXIDASE AB SER IA-ACNC: 1.3 UNITS (ref 0–0.9)
P-ANCA ATYPICAL TITR SER IF: ABNORMAL TITER
P-ANCA TITR SER IF: ABNORMAL TITER
PROTEINASE3 AB SER IA-ACNC: <0.2 UNITS (ref 0–0.9)

## 2023-09-29 LAB
BACTERIA SPEC AEROBE CULT: NORMAL
BACTERIA SPEC AEROBE CULT: NORMAL

## 2023-10-01 NOTE — PROGRESS NOTES
"Enter Query Response Below      Query Response: Sepsis causing acute kidney injury, hyperbilirubinemia and/or transaminitis ruled in,  present on admission             If applicable, please update the problem list.     Patient: Brandon Yo        : 1935  Account: [id number]           Admit Date: 2023        How to Respond to this query:       a. Click New Note     b. Answer query within the yellow box.                c. Update the Problem List, if applicable.      If you have any questions about this query contact me at: [email or web address]     ,    Patient is admitted to inpatient  with aspiration pneumonia, exacerbation of heart failure and acute respiratory failure.    -Hospitalist note  documents \"Sepsis not certain source...\"    -Nephrology note  documents \"Clinical picture consistent with dense ATN in the setting of possible severe inflammatory syndrome, either due to sepsis or due to other cause.\"    -GI progress note  documents \"Transaminitis: Showing gradual improvement, still not clear as to the etiology although may be more related to the sepsis picture.\"  Labs and vital signs:   - WBC 9.37, Bilirubin 0.3, Creatinine 1.15, Lactate 1.5   (inpatient)- Creatinine 1.74, WBC 22.74, Procalcitonin 1.01, HR  72-92   - Creatinine 3.03-4.42, Lactate 2.7-3.4, paO2 51.8 on room air, HR 90-97   - Bilirubin 6.4, Creatinine 5.52, Lactate 2.7-1.8, HR -172   - CRP 12.04, Procalcitonin 16.90, HR   Treatment included IV antibiotics, IV fluids and bolus.    Please clarify the following:    Sepsis causing acute kidney injury, hyperbilirubinemia and/or transaminitis ruled in, specify if present on admission _____  Sepsis without organ dysfunction ruled in, specify if present on admission _____  Sepsis ruled out  Other- specify______  Unable to determine    By submitting this query, we are merely seeking further clarification of documentation to " accurately reflect all conditions that you are monitoring, evaluating, treating or that extend the hospitalization or utilize additional resources of care. Please utilize your independent clinical judgment when addressing the question(s) above.     This query and your response, once completed, will be entered into the legal medical record.    Sincerely,  Dorota MIGUEL, RN  Clinical Documentation Integrity Program   [email or web address]

## 2023-10-01 NOTE — PROGRESS NOTES
"Enter Query Response Below      Query Response:     YESSY unspecified             If applicable, please update the problem list.     Patient: Brandon Yo        : 1935  Account: [id number]           Admit Date: 2023        How to Respond to this query:       a. Click New Note     b. Answer query within the yellow box.                c. Update the Problem List, if applicable.      If you have any questions about this query contact me at: [email or web address]     ,    Discharge summary includes diagnosis of acute on chronic renal failure and states \"renal function did not improve with hydration and blood pressure control.\"  Nephrologist note  states \"Clinical picture consistent with dense ATN in the setting of possible severe inflammatory syndrome, either due to sepsis or due to other  cause.\"  Baseline creatinine noted to be around 1.1.  Creatinine level increased daily from 1.15 () to 6.82 () and patient became oliganuric.    Please clarify if the patient treated/monitored for one or more of the following:     Acute kidney injury (YESSY) unspecified  Acute kidney injury (YESSY) due to Acute tubular necrosis (ATN)  Acute kidney injury (YESSY) due to other (specify) __________  Other- specify: __________  Unable to determine     By submitting this query, we are merely seeking further clarification of documentation to accurately reflect all conditions that you are monitoring, evaluating, treating or that extend the hospitalization or utilize additional resources of care. Please utilize your independent clinical judgment when addressing the question(s) above.     This query and your response, once completed, will be entered into the legal medical record.    Sincerely,  Dorota MIGUEL, RN  Clinical Documentation Integrity Program   [email or web address]  "

## 2023-10-03 LAB
A-A DO2: 61 MMHG
ARTERIAL PATENCY WRIST A: POSITIVE
ATMOSPHERIC PRESS: 746.5 MMHG
BASE EXCESS BLDA CALC-SCNC: 1.8 MMOL/L (ref 0–2)
BDY SITE: ABNORMAL
BUN BLDA-MCNC: 84 MG/DL
CA-I BLDA-SCNC: 1 MMOL/L (ref 2.2–2.55)
CHLORIDE BLDA-SCNC: 107 MMOL/L (ref 98–107)
CO2 BLDA-SCNC: 24.1 MMOL/L (ref 23–27)
CREAT BLDA-MCNC: 3.96 MG/DL (ref 0.6–130)
D-LACTATE SERPL-SCNC: 2.7 MMOL/L (ref 0.5–2)
GLUCOSE BLDC GLUCOMTR-MCNC: 112 MG/DL (ref 70–130)
HCO3 BLDA-SCNC: 24.4 MMOL/L (ref 22–28)
HEMODILUTION: NO
INHALED O2 CONCENTRATION: 21 %
Lab: ABNORMAL
MODALITY: ABNORMAL
NOTIFIED WHO: ABNORMAL
O2 A-A PPRESDIFF RESPIRATORY: 0.4 MMHG
PCO2 BLDA: 30.2 MM HG (ref 35–45)
PH BLDA: 7.51 PH UNITS (ref 7.35–7.45)
PO2 BLDA: 51.8 MM HG (ref 80–100)
POTASSIUM BLDA-SCNC: 4.3 MMOL/L (ref 3.5–5.2)
READ BACK: YES
SAO2 % BLDCOA: 90.1 % (ref 92–98.5)
SODIUM BLD-SCNC: 141 MMOL/L (ref 136–145)
TOTAL RATE: 20 BREATHS/MINUTE
